# Patient Record
Sex: FEMALE | Race: BLACK OR AFRICAN AMERICAN | Employment: FULL TIME | URBAN - METROPOLITAN AREA
[De-identification: names, ages, dates, MRNs, and addresses within clinical notes are randomized per-mention and may not be internally consistent; named-entity substitution may affect disease eponyms.]

---

## 2017-10-05 ENCOUNTER — APPOINTMENT (OUTPATIENT)
Dept: GENERAL RADIOLOGY | Facility: HOSPITAL | Age: 32
End: 2017-10-05
Attending: EMERGENCY MEDICINE
Payer: COMMERCIAL

## 2017-10-05 ENCOUNTER — HOSPITAL ENCOUNTER (EMERGENCY)
Facility: HOSPITAL | Age: 32
Discharge: HOME | End: 2017-10-05
Attending: EMERGENCY MEDICINE
Payer: COMMERCIAL

## 2017-10-05 VITALS
BODY MASS INDEX: 31.07 KG/M2 | DIASTOLIC BLOOD PRESSURE: 77 MMHG | SYSTOLIC BLOOD PRESSURE: 114 MMHG | HEIGHT: 68 IN | WEIGHT: 205 LBS | TEMPERATURE: 98 F | RESPIRATION RATE: 16 BRPM | OXYGEN SATURATION: 99 % | HEART RATE: 59 BPM

## 2017-10-05 DIAGNOSIS — R07.89 CHEST WALL PAIN: Primary | ICD-10-CM

## 2017-10-05 PROCEDURE — 96375 TX/PRO/DX INJ NEW DRUG ADDON: CPT

## 2017-10-05 PROCEDURE — 80048 BASIC METABOLIC PNL TOTAL CA: CPT | Performed by: EMERGENCY MEDICINE

## 2017-10-05 PROCEDURE — 81025 URINE PREGNANCY TEST: CPT | Performed by: EMERGENCY MEDICINE

## 2017-10-05 PROCEDURE — 93005 ELECTROCARDIOGRAM TRACING: CPT

## 2017-10-05 PROCEDURE — 71010 XR CHEST AP PORTABLE  (CPT=71010): CPT | Performed by: EMERGENCY MEDICINE

## 2017-10-05 PROCEDURE — 93010 ELECTROCARDIOGRAM REPORT: CPT | Performed by: EMERGENCY MEDICINE

## 2017-10-05 PROCEDURE — 81003 URINALYSIS AUTO W/O SCOPE: CPT | Performed by: EMERGENCY MEDICINE

## 2017-10-05 PROCEDURE — 85025 COMPLETE CBC W/AUTO DIFF WBC: CPT | Performed by: EMERGENCY MEDICINE

## 2017-10-05 PROCEDURE — 84484 ASSAY OF TROPONIN QUANT: CPT | Performed by: EMERGENCY MEDICINE

## 2017-10-05 PROCEDURE — 99285 EMERGENCY DEPT VISIT HI MDM: CPT

## 2017-10-05 PROCEDURE — 96374 THER/PROPH/DIAG INJ IV PUSH: CPT

## 2017-10-05 RX ORDER — DIAZEPAM 5 MG/ML
2.5 INJECTION, SOLUTION INTRAMUSCULAR; INTRAVENOUS ONCE
Status: COMPLETED | OUTPATIENT
Start: 2017-10-05 | End: 2017-10-05

## 2017-10-05 RX ORDER — IBUPROFEN 600 MG/1
600 TABLET ORAL EVERY 8 HOURS PRN
Qty: 20 TABLET | Refills: 0 | Status: SHIPPED | OUTPATIENT
Start: 2017-10-05 | End: 2017-10-12

## 2017-10-05 RX ORDER — KETOROLAC TROMETHAMINE 30 MG/ML
30 INJECTION, SOLUTION INTRAMUSCULAR; INTRAVENOUS ONCE
Status: COMPLETED | OUTPATIENT
Start: 2017-10-05 | End: 2017-10-05

## 2017-10-05 RX ORDER — MORPHINE SULFATE 2 MG/ML
2 INJECTION, SOLUTION INTRAMUSCULAR; INTRAVENOUS ONCE
Status: COMPLETED | OUTPATIENT
Start: 2017-10-05 | End: 2017-10-05

## 2017-10-05 RX ORDER — CYCLOBENZAPRINE HCL 10 MG
10 TABLET ORAL 3 TIMES DAILY PRN
Qty: 20 TABLET | Refills: 0 | Status: SHIPPED | OUTPATIENT
Start: 2017-10-05 | End: 2017-10-12

## 2017-10-05 NOTE — ED PROVIDER NOTES
Patient Seen in: [facility] AND [facility] Emergency Department    History   Patient presents with:  Chest Pain Angina (cardiovascular)    Stated Complaint:     HPI    22-year-old female with no significant past medical history here with complaints of right-side other systems reviewed and negative except as noted above. PSFH elements reviewed from today and agreed except as otherwise stated in HPI.     Physical Exam   ED Triage Vitals  BP: 114/77 [10/05/17 1031]  Pulse: 80 [10/05/17 0925]  Resp: 16 [10/05/17 092 the cardiac monitor and a rhythm strip obtained with the indication of chest pain. Monitor shows regular rhythm at a rate of 58 bpm.     My interpretation is   abnormal for rate   Normal for rhythm  Without ectopy.      Pulse Oximeter:  Pulse oximetry on r K/UL   Eosinophil Absolute 0.1 0.0 - 0.7 K/UL   Basophil Absolute 0.0 0.0 - 0.2 K/UL   -URINALYSIS WITH CULTURE REFLEX   Collection Time: 10/05/17 10:06 AM   Result Value Ref Range   Urine Color Yellow Yellow   Clarity Urine Hazy (A) Clear   Spec [place] 1 and reviewed those reports. Complicating Factors: The patient already has has Chronic idiopathic urticaria;  Allergic rhinitis due to allergen; and Allergic rhinitis due to other allergen on her problem list. to contribute to the complexity of his ED valdez

## 2018-07-29 ENCOUNTER — APPOINTMENT (OUTPATIENT)
Dept: CT IMAGING | Facility: HOSPITAL | Age: 33
End: 2018-07-29
Attending: EMERGENCY MEDICINE
Payer: COMMERCIAL

## 2018-07-29 ENCOUNTER — HOSPITAL ENCOUNTER (OUTPATIENT)
Age: 33
Discharge: ANOTHER HOSPITAL | End: 2018-07-29
Attending: EMERGENCY MEDICINE
Payer: COMMERCIAL

## 2018-07-29 ENCOUNTER — HOSPITAL ENCOUNTER (EMERGENCY)
Facility: HOSPITAL | Age: 33
Discharge: HOME | End: 2018-07-29
Attending: EMERGENCY MEDICINE
Payer: COMMERCIAL

## 2018-07-29 VITALS
RESPIRATION RATE: 16 BRPM | OXYGEN SATURATION: 100 % | BODY MASS INDEX: 26.07 KG/M2 | WEIGHT: 172 LBS | TEMPERATURE: 98 F | HEART RATE: 54 BPM | HEIGHT: 68 IN | SYSTOLIC BLOOD PRESSURE: 135 MMHG | DIASTOLIC BLOOD PRESSURE: 85 MMHG

## 2018-07-29 VITALS
OXYGEN SATURATION: 99 % | TEMPERATURE: 99 F | HEART RATE: 71 BPM | RESPIRATION RATE: 18 BRPM | WEIGHT: 173 LBS | HEIGHT: 68 IN | BODY MASS INDEX: 26.22 KG/M2 | SYSTOLIC BLOOD PRESSURE: 112 MMHG | DIASTOLIC BLOOD PRESSURE: 83 MMHG

## 2018-07-29 DIAGNOSIS — R51.9 ACUTE NONINTRACTABLE HEADACHE, UNSPECIFIED HEADACHE TYPE: Primary | ICD-10-CM

## 2018-07-29 DIAGNOSIS — R51.9 NONINTRACTABLE HEADACHE, UNSPECIFIED CHRONICITY PATTERN, UNSPECIFIED HEADACHE TYPE: Primary | ICD-10-CM

## 2018-07-29 LAB
ANION GAP SERPL CALC-SCNC: 6 MMOL/L (ref 0–18)
B-HCG UR QL: NEGATIVE
BASOPHILS # BLD: 0 K/UL (ref 0–0.2)
BASOPHILS NFR BLD: 0 %
BILIRUB UR QL: NEGATIVE
BUN SERPL-MCNC: 11 MG/DL (ref 8–20)
BUN/CREAT SERPL: 12.1 (ref 10–20)
CALCIUM SERPL-MCNC: 9 MG/DL (ref 8.5–10.5)
CHLORIDE SERPL-SCNC: 105 MMOL/L (ref 95–110)
CLARITY UR: CLEAR
CO2 SERPL-SCNC: 26 MMOL/L (ref 22–32)
COLOR UR: YELLOW
CREAT SERPL-MCNC: 0.91 MG/DL (ref 0.5–1.5)
EOSINOPHIL # BLD: 0 K/UL (ref 0–0.7)
EOSINOPHIL NFR BLD: 1 %
ERYTHROCYTE [DISTWIDTH] IN BLOOD BY AUTOMATED COUNT: 14.6 % (ref 11–15)
GLUCOSE SERPL-MCNC: 87 MG/DL (ref 70–99)
GLUCOSE UR-MCNC: NEGATIVE MG/DL
HCT VFR BLD AUTO: 34.5 % (ref 35–48)
HGB BLD-MCNC: 11.3 G/DL (ref 12–16)
HGB UR QL STRIP.AUTO: NEGATIVE
KETONES UR-MCNC: NEGATIVE MG/DL
LYMPHOCYTES # BLD: 1.8 K/UL (ref 1–4)
LYMPHOCYTES NFR BLD: 44 %
MCH RBC QN AUTO: 26.8 PG (ref 27–32)
MCHC RBC AUTO-ENTMCNC: 32.6 G/DL (ref 32–37)
MCV RBC AUTO: 82 FL (ref 80–100)
MONOCYTES # BLD: 0.3 K/UL (ref 0–1)
MONOCYTES NFR BLD: 7 %
NEUTROPHILS # BLD AUTO: 2 K/UL (ref 1.8–7.7)
NEUTROPHILS NFR BLD: 48 %
NITRITE UR QL STRIP.AUTO: NEGATIVE
OSMOLALITY UR CALC.SUM OF ELEC: 283 MOSM/KG (ref 275–295)
PH UR: 7 [PH] (ref 5–8)
PLATELET # BLD AUTO: 184 K/UL (ref 140–400)
PMV BLD AUTO: 8.3 FL (ref 7.4–10.3)
POTASSIUM SERPL-SCNC: 3.7 MMOL/L (ref 3.3–5.1)
PROT UR-MCNC: NEGATIVE MG/DL
RBC # BLD AUTO: 4.21 M/UL (ref 3.7–5.4)
RBC #/AREA URNS AUTO: 1 /HPF
SODIUM SERPL-SCNC: 137 MMOL/L (ref 136–144)
SP GR UR STRIP: 1.01 (ref 1–1.03)
UROBILINOGEN UR STRIP-ACNC: <2
VIT C UR-MCNC: NEGATIVE MG/DL
WBC # BLD AUTO: 4.2 K/UL (ref 4–11)
WBC #/AREA URNS AUTO: 3 /HPF

## 2018-07-29 PROCEDURE — 81025 URINE PREGNANCY TEST: CPT

## 2018-07-29 PROCEDURE — 99285 EMERGENCY DEPT VISIT HI MDM: CPT

## 2018-07-29 PROCEDURE — 80048 BASIC METABOLIC PNL TOTAL CA: CPT | Performed by: EMERGENCY MEDICINE

## 2018-07-29 PROCEDURE — 70450 CT HEAD/BRAIN W/O DYE: CPT | Performed by: EMERGENCY MEDICINE

## 2018-07-29 PROCEDURE — 96361 HYDRATE IV INFUSION ADD-ON: CPT

## 2018-07-29 PROCEDURE — 81001 URINALYSIS AUTO W/SCOPE: CPT | Performed by: EMERGENCY MEDICINE

## 2018-07-29 PROCEDURE — 85025 COMPLETE CBC W/AUTO DIFF WBC: CPT | Performed by: EMERGENCY MEDICINE

## 2018-07-29 PROCEDURE — 96375 TX/PRO/DX INJ NEW DRUG ADDON: CPT

## 2018-07-29 PROCEDURE — 93005 ELECTROCARDIOGRAM TRACING: CPT

## 2018-07-29 PROCEDURE — 96374 THER/PROPH/DIAG INJ IV PUSH: CPT

## 2018-07-29 PROCEDURE — 99213 OFFICE O/P EST LOW 20 MIN: CPT

## 2018-07-29 PROCEDURE — 93010 ELECTROCARDIOGRAM REPORT: CPT | Performed by: EMERGENCY MEDICINE

## 2018-07-29 RX ORDER — MELOXICAM 7.5 MG/1
7.5 TABLET ORAL DAILY
Qty: 14 TABLET | Refills: 0 | Status: SHIPPED | OUTPATIENT
Start: 2018-07-29 | End: 2018-08-12

## 2018-07-29 RX ORDER — METOCLOPRAMIDE HYDROCHLORIDE 5 MG/ML
10 INJECTION INTRAMUSCULAR; INTRAVENOUS ONCE
Status: COMPLETED | OUTPATIENT
Start: 2018-07-29 | End: 2018-07-29

## 2018-07-29 RX ORDER — DIPHENHYDRAMINE HYDROCHLORIDE 50 MG/ML
25 INJECTION INTRAMUSCULAR; INTRAVENOUS ONCE
Status: COMPLETED | OUTPATIENT
Start: 2018-07-29 | End: 2018-07-29

## 2018-07-29 RX ORDER — DEXAMETHASONE SODIUM PHOSPHATE 4 MG/ML
10 VIAL (ML) INJECTION ONCE
Status: COMPLETED | OUTPATIENT
Start: 2018-07-29 | End: 2018-07-29

## 2018-07-29 RX ORDER — KETOROLAC TROMETHAMINE 30 MG/ML
30 INJECTION, SOLUTION INTRAMUSCULAR; INTRAVENOUS ONCE
Status: COMPLETED | OUTPATIENT
Start: 2018-07-29 | End: 2018-07-29

## 2018-07-29 RX ORDER — DIPHENHYDRAMINE HCL 25 MG
25 TABLET ORAL EVERY 6 HOURS PRN
Qty: 20 TABLET | Refills: 0 | Status: SHIPPED | OUTPATIENT
Start: 2018-07-29 | End: 2018-08-03

## 2018-07-29 RX ORDER — METOCLOPRAMIDE 10 MG/1
10 TABLET ORAL EVERY 6 HOURS PRN
Qty: 20 TABLET | Refills: 0 | Status: SHIPPED | OUTPATIENT
Start: 2018-07-29 | End: 2018-08-03

## 2018-07-29 NOTE — ED PROVIDER NOTES
Patient Seen in: [facility] AND [facility] Emergency Department    History   Patient presents with:  Headache (neurologic)    Stated Complaint: Headache/dizziness     HPI    28 yo F with seasonal allergies presenting with one day of diffuse frontal cephalgia assoc Triage Vitals [07/29/18 1603]  BP: (!) 142/91  Pulse: 50  Resp: 18  Temp: 98.6 °F (37 °C)  Temp src: Oral  SpO2: 100 %  O2 Device: None (Room air)    Current:BP (!) 142/91   Pulse 50   Temp 98.6 °F (37 °C) (Oral)   Resp 18   Ht 172.7 cm (5' 8\")   Wt 78.5 RAINBOW DRAW LAVENDER TALL (BNP)     EKG    Rate, intervals and axes as noted on EKG Report.   Rate: 47  Rhythm: Sinus Rhythm  Reading: sinus shahzad 55 without STT changes and unchanged from 10/5/17 as interpreted by myself           Ct Brain Or Head (6050 Impression:  Nonintractable headache, unspecified chronicity pattern, unspecified headache type  (primary encounter diagnosis)    Disposition:  Discharge    Follow-up:  Royer Fallon [address]  [address] [id number]    Call  For follow

## 2018-07-29 NOTE — ED PROVIDER NOTES
Patient Seen in: [facility]    History   Patient presents with:  Headache (neurologic)    Stated Complaint: dizzy, headache     HPI    This patient complains of headache and dizziness.   Patient states she has had a headac denies vertigo    All other systems reviewed and negative except as noted above.     Physical Exam   ED Triage Vitals [07/29/18 1513]  BP: 135/85  Pulse: 54  Resp: 16  Temp: 98.1 °F (36.7 °C)  Temp src: Oral  SpO2: 100 %  O2 Device: None (Room air)    Antoine

## 2018-09-17 ENCOUNTER — OFFICE VISIT (OUTPATIENT)
Dept: NEUROLOGY | Facility: CLINIC | Age: 33
End: 2018-09-17
Payer: COMMERCIAL

## 2018-09-17 VITALS
HEIGHT: 68 IN | HEART RATE: 66 BPM | DIASTOLIC BLOOD PRESSURE: 70 MMHG | WEIGHT: 174 LBS | SYSTOLIC BLOOD PRESSURE: 114 MMHG | BODY MASS INDEX: 26.37 KG/M2

## 2018-09-17 DIAGNOSIS — G43.109 MIGRAINE WITH AURA AND WITHOUT STATUS MIGRAINOSUS, NOT INTRACTABLE: Primary | ICD-10-CM

## 2018-09-17 PROCEDURE — 99243 OFF/OP CNSLTJ NEW/EST LOW 30: CPT | Performed by: OTHER

## 2018-09-17 RX ORDER — DIPHENHYDRAMINE HCL 25 MG
25 TABLET ORAL NIGHTLY PRN
COMMUNITY

## 2018-09-17 RX ORDER — METOCLOPRAMIDE 10 MG/1
10 TABLET ORAL AS NEEDED
COMMUNITY
End: 2018-09-17

## 2018-09-17 RX ORDER — SUMATRIPTAN 50 MG/1
TABLET, FILM COATED ORAL
Qty: 9 TABLET | Refills: 3 | Status: SHIPPED | OUTPATIENT
Start: 2018-09-17 | End: 2019-02-04

## 2018-09-17 RX ORDER — METOCLOPRAMIDE 10 MG/1
TABLET ORAL
Qty: 30 TABLET | Refills: 1 | Status: SHIPPED | OUTPATIENT
Start: 2018-09-17 | End: 2021-02-24

## 2018-09-17 RX ORDER — MELOXICAM 7.5 MG/1
TABLET ORAL
Qty: 45 TABLET | Refills: 1 | Status: SHIPPED | OUTPATIENT
Start: 2018-09-17 | End: 2021-02-24

## 2018-09-17 RX ORDER — MELOXICAM 7.5 MG/1
7.5 TABLET ORAL AS NEEDED
COMMUNITY
End: 2018-09-17

## 2018-09-17 NOTE — PATIENT INSTRUCTIONS
Migraine headache  Introduction  Migraines are extremely painful, recurring headaches that are sometimes accompanied by other symptoms, such as visual disturbances, for example, seeing an aura or nausea.  There are 2 types of migraine:  Migraine with aura, vessels narrow or constrict, reducing blood flow and leading to visual disturbances, difficulty speaking, weakness, numbness, or tingling sensation in one area of the body, or other similar symptoms.  Later, the blood vessels dilate or enlarge, leading to i whether you have a migraine or another kind of headache, such as a tension or sinus headache.  Your doctor will ask questions about when your headaches occur, how long they last, how often they come on, the location of the pain, and any symptoms that accomp and time it started. Note what you ate for the preceding 24 hours, how long you slept the night before, what you were doing just before the headache, any unusual stress in your life, how long the headache lasted, and what you did to make it stop.   Other li drugs help prevent migraines, although researchers are not sure why:   Divalproex sodium (Depakote)   Gabapentin (Neurontin)   Topiramate (Topamax)   Botox.  Botox, a medication made from a purified form of botulinum toxin, has been approved to treat migrai Cheese   Monosodium glutamate (MSG), a flavor enhancer found often in food from [employer] containing the amino acid tyramine, found in red wine, aged cheese, smoked fish, chicken livers, figs, and some beans   Nuts   Peanut butter   Hari people with low levels of magnesium. In one study, people who took magnesium reduce the frequency of attacks by 41.6%, compared to 15.8% in those who took placebo.  Some studies also suggest that magnesium may help women whose migraines are triggered by the months. More research is needed to see whether butterbur is effective at preventing migraines. The studies used a standardized extract that lowered the amount of substances in the herb that might potentially harm the liver.  If you want to try butterbur for sinensis). Ask your doctor before taking Lex Hoe, as it may interact with some medications or cause problems for people with some cancers. Lisa (Zingiber officinale)   Ginkgo biloba   [place] bark(Salix spp.).  People who are sensitive to aspirin shoul are believed to correspond to areas throughout the body. Preliminary studies suggest it may relieve pain and allow people with migraines to take less pain medication. More research is needed.  Practitioners believe reflexology helps you become more aware of the head and may be relieved following urination; this remedy is most appropriate for individuals who feel extremely weak and have difficulty keeping their eyes open. Ignatia.  For pain that may be described as a feeling of something being driven into the pattern (such as every seven days), and are accompanied by nausea and vomiting; pain is aggravated by motion, light or sun exposure, odors, and noise; this remedy is appropriate for children who may have a craving for spicy or acidic foods, despite having over-the-counter or prescription, or any complementary therapy before or during your pregnancy. Some doctors may recommend treating mild-to-moderate attacks during pregnancy with acetaminophen (Tylenol).   Warnings and Precautions  Use medications only as d

## 2018-09-17 NOTE — PROGRESS NOTES
Neurology Outpatient Consult Note    Leah Myers : 1985   Referring Physician: Dr. Alyssa Machado  HPI:     Leah Myers is a 27year old female who is being seen in neurologic evaluation. Patient is being seen in evaluation for headache. mouth nightly. Disp: 30 tablet Rfl: 11   Fluticasone Propionate (FLONASE) 50 MCG/ACT Nasal Suspension 1 spray by Nasal route daily. Disp: 1 Bottle Rfl: 11   Cetirizine HCl 10 MG Oral Cap Take  by mouth.  Disp:  Rfl:       Past Medical History:   Diagnosis D wheezing  CARDIOVASCULAR: no chest pain, no palpitations  GI: see HPI  GENITAL/: no dysuria, no frequency  MUSCULOSKELETAL: no muscle pain, no muscle weakness    PSYCH: no depression, no anxiety  NEURO: see HPI    EXAM:     Vitals:  /70   Pulse 66

## 2018-09-26 ENCOUNTER — APPOINTMENT (OUTPATIENT)
Dept: LAB | Facility: HOSPITAL | Age: 33
End: 2018-09-26
Attending: INTERNAL MEDICINE
Payer: COMMERCIAL

## 2018-09-26 ENCOUNTER — HOSPITAL ENCOUNTER (OUTPATIENT)
Dept: GENERAL RADIOLOGY | Facility: HOSPITAL | Age: 33
Discharge: HOME | End: 2018-09-26
Attending: INTERNAL MEDICINE
Payer: COMMERCIAL

## 2018-09-26 ENCOUNTER — OFFICE VISIT (OUTPATIENT)
Dept: RHEUMATOLOGY | Facility: CLINIC | Age: 33
End: 2018-09-26
Payer: COMMERCIAL

## 2018-09-26 VITALS
DIASTOLIC BLOOD PRESSURE: 80 MMHG | RESPIRATION RATE: 17 BRPM | BODY MASS INDEX: 26.52 KG/M2 | SYSTOLIC BLOOD PRESSURE: 120 MMHG | HEART RATE: 60 BPM | HEIGHT: 68 IN | WEIGHT: 175 LBS | TEMPERATURE: 99 F

## 2018-09-26 DIAGNOSIS — M65.9 TENOSYNOVITIS OF HAND: ICD-10-CM

## 2018-09-26 DIAGNOSIS — M25.50 POLYARTHRALGIA: ICD-10-CM

## 2018-09-26 DIAGNOSIS — M25.50 POLYARTHRALGIA: Primary | ICD-10-CM

## 2018-09-26 LAB
CRP SERPL-MCNC: <0.5 MG/DL (ref 0–0.9)
ERYTHROCYTE [SEDIMENTATION RATE] IN BLOOD: 13 MM/HR (ref 0–20)
RHEUMATOID FACT SER QL: <5 IU/ML

## 2018-09-26 PROCEDURE — 36415 COLL VENOUS BLD VENIPUNCTURE: CPT | Performed by: INTERNAL MEDICINE

## 2018-09-26 PROCEDURE — 73130 X-RAY EXAM OF HAND: CPT | Performed by: INTERNAL MEDICINE

## 2018-09-26 PROCEDURE — 99212 OFFICE O/P EST SF 10 MIN: CPT | Performed by: INTERNAL MEDICINE

## 2018-09-26 PROCEDURE — 86431 RHEUMATOID FACTOR QUANT: CPT | Performed by: INTERNAL MEDICINE

## 2018-09-26 PROCEDURE — 86140 C-REACTIVE PROTEIN: CPT | Performed by: INTERNAL MEDICINE

## 2018-09-26 PROCEDURE — 86200 CCP ANTIBODY: CPT | Performed by: INTERNAL MEDICINE

## 2018-09-26 PROCEDURE — 99204 OFFICE O/P NEW MOD 45 MIN: CPT | Performed by: INTERNAL MEDICINE

## 2018-09-26 PROCEDURE — 85652 RBC SED RATE AUTOMATED: CPT | Performed by: INTERNAL MEDICINE

## 2018-09-26 NOTE — PROGRESS NOTES
Sima Oneal is a 27year old female who presents for Patient presents with:  Joint Pain: swelling in hands and feet after activity, pins and needles in toes, random.       HPI:     I had the pleasure of seeing Sima Oneal on 9/26/2018 for evaluation f migraine, no more than 2 tablets in 24 hours Disp: 30 tablet Rfl: 1   Meloxicam 7.5 MG Oral Tab Take 1 tablet as needed for headache; no more than 2 tablets in 24 hours; take with food and water Disp: 45 tablet Rfl: 1   Levocetirizine Dihydrochloride (XYZA BMI 26.61 kg/m²   GEN: AAOx3, NAD  HEENT: EOMI, PERRLA, no injection or icterus, oral mucosa moist, no oral lesions. No lymphadenopathy. No facial rash  CVS: RRR, no murmurs rubs or gallops. Equal 2+ distal pulses.    LUNGS: CTAB, no increased work of breat K/UL 0.0   Glucose      70 - 99 mg/dL 87   Sodium      136 - 144 mmol/L 137   Potassium      3.3 - 5.1 mmol/L 3.7   Chloride      95 - 110 mmol/L 105   Carbon Dioxide, Total      22 - 32 mmol/L 26   BUN      8 - 20 mg/dL 11   CREATININE      0.50 - 1.50 mg

## 2018-09-26 NOTE — PATIENT INSTRUCTIONS
1. You were seen today for R hand pain likely due to Tenosynovitis   2. Plan to obtain xray of hands and blood work  3. Will also obtain US of hand  4. Once work up is done we may inject the R hand   5.  Follow up in 1-2 weeks, after US is done  Arthralgia your finger or thumb. It is also called tenosynovitis (ten-oh-sin-oh-VY-tis). Tendons (cordlike fibers that attach muscle to bone and allow you to bend the joints) become swollen.  So does the synovium (a slick membrane that allows the tendons to move easil care. Always follow your healthcare professional's instructions. Treating Trigger Finger     The tendon sheath is opened to release the tendon. Once the tendon can move freely again, the finger can bend and straighten more normally.      Trigger fing

## 2018-09-27 ENCOUNTER — HOSPITAL ENCOUNTER (OUTPATIENT)
Dept: ULTRASOUND IMAGING | Facility: HOSPITAL | Age: 33
Discharge: HOME | End: 2018-09-27
Attending: INTERNAL MEDICINE
Payer: COMMERCIAL

## 2018-09-27 DIAGNOSIS — M65.9 TENOSYNOVITIS OF HAND: ICD-10-CM

## 2018-09-27 DIAGNOSIS — M25.50 POLYARTHRALGIA: ICD-10-CM

## 2018-09-27 PROCEDURE — 76881 US COMPL JOINT R-T W/IMG: CPT | Performed by: INTERNAL MEDICINE

## 2018-09-28 LAB — CCP IGG SERPL-ACNC: 0.9 U/ML (ref 0–6.9)

## 2018-10-05 ENCOUNTER — OFFICE VISIT (OUTPATIENT)
Dept: RHEUMATOLOGY | Facility: CLINIC | Age: 33
End: 2018-10-05
Payer: COMMERCIAL

## 2018-10-05 VITALS
SYSTOLIC BLOOD PRESSURE: 111 MMHG | WEIGHT: 174.5 LBS | DIASTOLIC BLOOD PRESSURE: 70 MMHG | HEART RATE: 69 BPM | HEIGHT: 68 IN | BODY MASS INDEX: 26.45 KG/M2

## 2018-10-05 DIAGNOSIS — M65.321 TRIGGER INDEX FINGER OF RIGHT HAND: Primary | ICD-10-CM

## 2018-10-05 PROCEDURE — 20552 NJX 1/MLT TRIGGER POINT 1/2: CPT | Performed by: INTERNAL MEDICINE

## 2018-10-05 PROCEDURE — 99213 OFFICE O/P EST LOW 20 MIN: CPT | Performed by: INTERNAL MEDICINE

## 2018-10-05 RX ORDER — METHYLPREDNISOLONE ACETATE 80 MG/ML
80 INJECTION, SUSPENSION INTRA-ARTICULAR; INTRALESIONAL; INTRAMUSCULAR; SOFT TISSUE ONCE
Status: COMPLETED | OUTPATIENT
Start: 2018-10-05 | End: 2018-10-05

## 2018-10-05 RX ADMIN — METHYLPREDNISOLONE ACETATE 80 MG: 80 INJECTION, SUSPENSION INTRA-ARTICULAR; INTRALESIONAL; INTRAMUSCULAR; SOFT TISSUE at 12:45:00

## 2018-10-05 NOTE — PROCEDURES
With  consent, I injected the patient's  finger with 0.25 ml lidocaine 1% and 0.25ml depo 80. It was under sterile technique using iodine and alcohol swabs and ethyl chloride was used as an anaesthetic spray. Patient tolerated it well.

## 2018-10-05 NOTE — PROGRESS NOTES
Charlie Wooten is a 26year old female. HPI:   Patient presents with:  Hand Pain: Right hand/ finger pain, swelling, stiffness    I had the pleasure of seeing Charlie Wooten on 10/5/2018 for follow up or R hand pain.      Current Medications for pain:  n Take  by mouth. Disp:  Rfl:      .cmed  Allergies:    Augmentin  [Amoclan]        Comment:Other reaction(s): diarrhea      ROS:   All other ROS are negative.      PHYSICAL EXAM:   GEN: AAOx3, NAD  HEENT: EOMI, PERRLA, no injection or icterus, oral mucosa mo follow-up of R hand pain particularly at the base of 1st and 2nd MCP.  ESR, CRP, RF and CCP were negative. Ultrasound of the hand showed no evidence of synovitis, erosions or tenosynovitis.   She continues to complain of trigger finger in her 2nd digit and

## 2018-10-22 ENCOUNTER — TELEPHONE (OUTPATIENT)
Dept: NEUROLOGY | Facility: CLINIC | Age: 33
End: 2018-10-22

## 2018-11-13 ENCOUNTER — OFFICE VISIT (OUTPATIENT)
Dept: ORTHOPEDICS CLINIC | Facility: CLINIC | Age: 33
End: 2018-11-13
Payer: COMMERCIAL

## 2018-11-13 VITALS — SYSTOLIC BLOOD PRESSURE: 107 MMHG | RESPIRATION RATE: 22 BRPM | DIASTOLIC BLOOD PRESSURE: 60 MMHG | HEART RATE: 51 BPM

## 2018-11-13 DIAGNOSIS — M65.311 TRIGGER FINGER OF RIGHT THUMB: Primary | ICD-10-CM

## 2018-11-13 DIAGNOSIS — M65.321 TRIGGER INDEX FINGER OF RIGHT HAND: ICD-10-CM

## 2018-11-13 DIAGNOSIS — M65.342 TRIGGER RING FINGER OF LEFT HAND: ICD-10-CM

## 2018-11-13 PROCEDURE — 99203 OFFICE O/P NEW LOW 30 MIN: CPT | Performed by: ORTHOPAEDIC SURGERY

## 2018-11-13 PROCEDURE — 20550 NJX 1 TENDON SHEATH/LIGAMENT: CPT | Performed by: ORTHOPAEDIC SURGERY

## 2018-11-13 RX ORDER — BETAMETHASONE SODIUM PHOSPHATE AND BETAMETHASONE ACETATE 3; 3 MG/ML; MG/ML
3 INJECTION, SUSPENSION INTRA-ARTICULAR; INTRALESIONAL; INTRAMUSCULAR; SOFT TISSUE ONCE
Status: COMPLETED | OUTPATIENT
Start: 2018-11-13 | End: 2018-11-13

## 2018-11-13 RX ADMIN — BETAMETHASONE SODIUM PHOSPHATE AND BETAMETHASONE ACETATE 3 MG: 3; 3 INJECTION, SUSPENSION INTRA-ARTICULAR; INTRALESIONAL; INTRAMUSCULAR; SOFT TISSUE at 12:06:00

## 2018-11-13 NOTE — PROGRESS NOTES
Per verbal order from KELLE, draw up 1ml of 1% lidocaine and 1ml of Celestone for cortisone injection to left ring finger.  Kenia Lorenzo

## 2018-11-13 NOTE — PROCEDURES
The patient desired injection. Under sterile conditions and following informed consent, the patient was injected with lidocaine and celestone in their left ring finger flexor tendon sheath.   The patient tolerated the procedure well and there were no compl

## 2018-11-13 NOTE — PROGRESS NOTES
11/13/2018  Doloris Caper  9/27/1985  26year old   female  Barry Gottlieb MD    HPI:   Patient presents with:  Hand Pain: Right - onset few months ago - no injury - has pain and swelling in trhe hand- was seen by rheumatology and had injections in her Diagnosis Date   • Multiple allergies 2013    Allergies: spt + t,rw,w,m,c,d,cr,f 2013      Past Surgical History:   Procedure Laterality Date   • LAP SLEEVE GASTRECTOMY     • TONSILLECTOMY        Family History   Problem Relation Age of Onset   • Heart S infection, neurovascular injury, failure of the procedure, stiffness, and potential need for additional surgery as well as anesthetic complications including death. The patient consented to the procedure.   .The patient desired injection for her left ring

## 2018-11-16 ENCOUNTER — TELEPHONE (OUTPATIENT)
Dept: ORTHOPEDICS CLINIC | Facility: CLINIC | Age: 33
End: 2018-11-16

## 2018-11-16 NOTE — TELEPHONE ENCOUNTER
Pt requesting to speak to [address] re: work note needed due to sx on 12/11, states she sent mychart msg but it seems it was misunderstood. Pls advise thank you.

## 2018-11-21 ENCOUNTER — TELEPHONE (OUTPATIENT)
Dept: ORTHOPEDICS CLINIC | Facility: CLINIC | Age: 33
End: 2018-11-21

## 2018-11-21 NOTE — TELEPHONE ENCOUNTER
Pt procedure/Surgery:  Right index and thumb trigger release   Pt insurance/number to contact:  [employer] [phone]  Insurance ID# and group: ID# [id number]  Group#[id number]  Dx:   R47.352, M65.321  CPT:  11618  Surgeon: Dr. Mery Ramos

## 2018-11-24 NOTE — TELEPHONE ENCOUNTER
Surgery planned for 12/11/18  May have off up to 2 weeks for postoperative pain and recovery  Follow-up 2 weeks postop  May return to light duty as tolerated -- no lifting, pushing, pulling more than 10lbs, no repetitive work, ground level work, no climbin

## 2018-12-11 ENCOUNTER — ANESTHESIA EVENT (OUTPATIENT)
Dept: SURGERY | Facility: HOSPITAL | Age: 33
End: 2018-12-11
Payer: COMMERCIAL

## 2018-12-11 ENCOUNTER — ANESTHESIA (OUTPATIENT)
Dept: SURGERY | Facility: HOSPITAL | Age: 33
End: 2018-12-11
Payer: COMMERCIAL

## 2018-12-11 ENCOUNTER — HOSPITAL ENCOUNTER (OUTPATIENT)
Facility: HOSPITAL | Age: 33
Setting detail: HOSPITAL OUTPATIENT SURGERY
Discharge: HOME | End: 2018-12-11
Attending: ORTHOPAEDIC SURGERY | Admitting: ORTHOPAEDIC SURGERY
Payer: COMMERCIAL

## 2018-12-11 VITALS
SYSTOLIC BLOOD PRESSURE: 129 MMHG | OXYGEN SATURATION: 98 % | TEMPERATURE: 98 F | BODY MASS INDEX: 27.28 KG/M2 | HEIGHT: 68 IN | RESPIRATION RATE: 16 BRPM | HEART RATE: 51 BPM | WEIGHT: 180 LBS | DIASTOLIC BLOOD PRESSURE: 81 MMHG

## 2018-12-11 DIAGNOSIS — M65.321 TRIGGER INDEX FINGER OF RIGHT HAND: ICD-10-CM

## 2018-12-11 DIAGNOSIS — M65.311 TRIGGER THUMB OF RIGHT HAND: ICD-10-CM

## 2018-12-11 PROBLEM — M65.331 TRIGGER FINGER, RIGHT MIDDLE FINGER: Status: ACTIVE | Noted: 2018-12-11

## 2018-12-11 PROCEDURE — 0LN70ZZ RELEASE RIGHT HAND TENDON, OPEN APPROACH: ICD-10-PCS | Performed by: ORTHOPAEDIC SURGERY

## 2018-12-11 PROCEDURE — S0077 INJECTION, CLINDAMYCIN PHOSP: HCPCS | Performed by: ANESTHESIOLOGY

## 2018-12-11 PROCEDURE — 81025 URINE PREGNANCY TEST: CPT

## 2018-12-11 PROCEDURE — S0077 INJECTION, CLINDAMYCIN PHOSP: HCPCS

## 2018-12-11 RX ORDER — SODIUM CHLORIDE, SODIUM LACTATE, POTASSIUM CHLORIDE, CALCIUM CHLORIDE 600; 310; 30; 20 MG/100ML; MG/100ML; MG/100ML; MG/100ML
INJECTION, SOLUTION INTRAVENOUS CONTINUOUS
Status: DISCONTINUED | OUTPATIENT
Start: 2018-12-11 | End: 2018-12-11

## 2018-12-11 RX ORDER — KETOROLAC TROMETHAMINE 30 MG/ML
30 INJECTION, SOLUTION INTRAMUSCULAR; INTRAVENOUS ONCE
Status: COMPLETED | OUTPATIENT
Start: 2018-12-11 | End: 2018-12-11

## 2018-12-11 RX ORDER — LIDOCAINE HYDROCHLORIDE 10 MG/ML
INJECTION, SOLUTION EPIDURAL; INFILTRATION; INTRACAUDAL; PERINEURAL AS NEEDED
Status: DISCONTINUED | OUTPATIENT
Start: 2018-12-11 | End: 2018-12-11 | Stop reason: HOSPADM

## 2018-12-11 RX ORDER — HYDROCODONE BITARTRATE AND ACETAMINOPHEN 5; 325 MG/1; MG/1
1 TABLET ORAL AS NEEDED
Status: DISCONTINUED | OUTPATIENT
Start: 2018-12-11 | End: 2018-12-11

## 2018-12-11 RX ORDER — FAMOTIDINE 20 MG/1
20 TABLET ORAL ONCE
Status: DISCONTINUED | OUTPATIENT
Start: 2018-12-11 | End: 2018-12-11 | Stop reason: HOSPADM

## 2018-12-11 RX ORDER — HYDROCODONE BITARTRATE AND ACETAMINOPHEN 5; 325 MG/1; MG/1
2 TABLET ORAL AS NEEDED
Status: DISCONTINUED | OUTPATIENT
Start: 2018-12-11 | End: 2018-12-11

## 2018-12-11 RX ORDER — ONDANSETRON 2 MG/ML
4 INJECTION INTRAMUSCULAR; INTRAVENOUS EVERY 6 HOURS PRN
Status: CANCELLED | OUTPATIENT
Start: 2018-12-11

## 2018-12-11 RX ORDER — LIDOCAINE HYDROCHLORIDE 10 MG/ML
INJECTION, SOLUTION EPIDURAL; INFILTRATION; INTRACAUDAL; PERINEURAL AS NEEDED
Status: DISCONTINUED | OUTPATIENT
Start: 2018-12-11 | End: 2018-12-11 | Stop reason: SURG

## 2018-12-11 RX ORDER — MORPHINE SULFATE 4 MG/ML
2 INJECTION, SOLUTION INTRAMUSCULAR; INTRAVENOUS EVERY 10 MIN PRN
Status: DISCONTINUED | OUTPATIENT
Start: 2018-12-11 | End: 2018-12-11

## 2018-12-11 RX ORDER — CLINDAMYCIN PHOSPHATE 150 MG/ML
INJECTION, SOLUTION INTRAVENOUS AS NEEDED
Status: DISCONTINUED | OUTPATIENT
Start: 2018-12-11 | End: 2018-12-11 | Stop reason: SURG

## 2018-12-11 RX ORDER — MORPHINE SULFATE 4 MG/ML
4 INJECTION, SOLUTION INTRAMUSCULAR; INTRAVENOUS EVERY 10 MIN PRN
Status: DISCONTINUED | OUTPATIENT
Start: 2018-12-11 | End: 2018-12-11

## 2018-12-11 RX ORDER — ACETAMINOPHEN 500 MG
1000 TABLET ORAL ONCE
Status: COMPLETED | OUTPATIENT
Start: 2018-12-11 | End: 2018-12-11

## 2018-12-11 RX ORDER — NALOXONE HYDROCHLORIDE 0.4 MG/ML
80 INJECTION, SOLUTION INTRAMUSCULAR; INTRAVENOUS; SUBCUTANEOUS AS NEEDED
Status: DISCONTINUED | OUTPATIENT
Start: 2018-12-11 | End: 2018-12-11

## 2018-12-11 RX ORDER — ONDANSETRON 2 MG/ML
4 INJECTION INTRAMUSCULAR; INTRAVENOUS ONCE AS NEEDED
Status: DISCONTINUED | OUTPATIENT
Start: 2018-12-11 | End: 2018-12-11

## 2018-12-11 RX ORDER — BUPIVACAINE HYDROCHLORIDE 5 MG/ML
INJECTION, SOLUTION EPIDURAL; INTRACAUDAL AS NEEDED
Status: DISCONTINUED | OUTPATIENT
Start: 2018-12-11 | End: 2018-12-11 | Stop reason: HOSPADM

## 2018-12-11 RX ORDER — METOCLOPRAMIDE 10 MG/1
10 TABLET ORAL ONCE
Status: DISCONTINUED | OUTPATIENT
Start: 2018-12-11 | End: 2018-12-11 | Stop reason: HOSPADM

## 2018-12-11 RX ORDER — CEFAZOLIN SODIUM/WATER 2 G/20 ML
2 SYRINGE (ML) INTRAVENOUS ONCE
Status: DISCONTINUED | OUTPATIENT
Start: 2018-12-11 | End: 2018-12-11 | Stop reason: HOSPADM

## 2018-12-11 RX ORDER — MORPHINE SULFATE 10 MG/ML
6 INJECTION, SOLUTION INTRAMUSCULAR; INTRAVENOUS EVERY 10 MIN PRN
Status: DISCONTINUED | OUTPATIENT
Start: 2018-12-11 | End: 2018-12-11

## 2018-12-11 RX ORDER — HYDROCODONE BITARTRATE AND ACETAMINOPHEN 5; 325 MG/1; MG/1
1-2 TABLET ORAL EVERY 6 HOURS PRN
Qty: 20 TABLET | Refills: 0 | Status: SHIPPED | OUTPATIENT
Start: 2018-12-11 | End: 2019-03-05 | Stop reason: ALTCHOICE

## 2018-12-11 RX ADMIN — SODIUM CHLORIDE, SODIUM LACTATE, POTASSIUM CHLORIDE, CALCIUM CHLORIDE: 600; 310; 30; 20 INJECTION, SOLUTION INTRAVENOUS at 17:03:00

## 2018-12-11 RX ADMIN — LIDOCAINE HYDROCHLORIDE 25 MG: 10 INJECTION, SOLUTION EPIDURAL; INFILTRATION; INTRACAUDAL; PERINEURAL at 16:25:00

## 2018-12-11 RX ADMIN — CLINDAMYCIN PHOSPHATE 600 MG: 150 INJECTION, SOLUTION INTRAVENOUS at 16:26:00

## 2018-12-11 NOTE — H&P
Shivani Brady  9/27/1985  26year old   female  Tess Vargas MD     HPI:   Patient presents with:  Hand Pain: Right - onset few months ago - no injury - has pain and swelling in trhe hand- was seen by rheumatology and had injections in her fingers - l Diagnosis Date   • Multiple allergies 2013     Allergies: spt + t,rw,w,m,c,d,cr,f 2013               Past Surgical History:   Procedure Laterality Date   • LAP SLEEVE GASTRECTOMY       • TONSILLECTOMY                 Family History   Problem Relation Age Surgery recommended was right index, right middle, and right thumb trigger release.   Risks include bleeding, infection, neurovascular injury, failure of the procedure, stiffness, and potential need for additional surgery as well as anesthetic complications

## 2018-12-11 NOTE — ADDENDUM NOTE
Addendum  created 12/11/18 1704 by Ruben Rock MD    Review and Sign - Ready for Procedure, Review and Sign - Signed

## 2018-12-11 NOTE — ANESTHESIA PREPROCEDURE EVALUATION
Anesthesia PreOp Note    HPI:     Serafin Galindo is a 26year old female who presents for preoperative consultation requested by: Shiloh Davila MD    Date of Surgery: 12/11/2018    Procedure(s):   FINGER TRIGGER RELEASE  Indication: Trigger thumb of rig tablet Rfl: 1 12/9/2018 at Unknown time   Levocetirizine Dihydrochloride (XYZAL) 5 MG Oral Tab Take 1 tablet by mouth nightly.  Disp: 30 tablet Rfl: 11 12/9/2018 at Unknown time   Fluticasone Propionate (FLONASE) 50 MCG/ACT Nasal Suspension 1 spray by Nasal Smokeless tobacco: Never Used    Substance and Sexual Activity      Alcohol use: No      Drug use: No      Sexual activity: Not on file    Other Topics      Concerns:         Service: Not Asked        Blood Transfusions: Not Asked        Caffeine complications, and any alternative forms of anesthetic management. All of the patient's questions were answered to the best of my ability. The patient desires the anesthetic management as planned.   Ashley Lechuga  12/11/2018 4:12 PM

## 2018-12-11 NOTE — ANESTHESIA POSTPROCEDURE EVALUATION
Patient: Carolene Peabody    Procedure Summary     Date:  12/11/18 Room / Location:  [facility] OR 08 / [facility] OR    Anesthesia Start:  9646 Anesthesia Stop:  0665    Procedure:  FINGER TRIGGER RELEASE (Right Hand) Diagnosis:       Trigger thumb of right sol

## 2018-12-11 NOTE — BRIEF OP NOTE
Pre-Operative Diagnosis: Trigger thumb of right hand [M65.311]  Trigger index finger of right hand, trigger middle finger of right hand     Post-Operative Diagnosis: same     Procedure Performed:   Procedure(s):  RIGHT INDEX, MIDDLE, AND THUMB TRIGGER RELE

## 2018-12-12 NOTE — OPERATIVE REPORT
After Visit Summary   2/2/2018    Layne Guadarrama    MRN: [id number]           Patient Information     Date Of Birth          1944        Visit Information        Provider Department      2/2/2018 9:30 AM Rita Muhammad MD [facility]        Today's Diagnoses     LVAD (left ventricular assist device) present (H)    -  1    Chronic systolic congestive heart failure (H)        Benign essential hypertension          Care Instructions    Medications:  1. Increase your dose of Losartan to 75mg daily. I you have dizziness, please call your VAD coordinator, Colt ([phone])    Follow-up:  1. We will order some labs for you to get drawn next week in [place] with your INR check.  2. Please see the Jazmine MARTINEZ In early March for follow up appointment.    Instructions:  1. Start using the weekly dsng change when it arrives in the mail.  2. You can start showering with you shower bag after I show you how to use it.    Page the VAD Coordinator on call if you gain more than 3 lb in a day or 5 in a week. Please also page if you feel unwell or have alarms.     Great to see you in clinic today. To Page the VAD Coordinator on call, dial [phone] option #4 and ask to speak to the VAD coordinator on call.             Follow-ups after your visit        Follow-up notes from your care team     Return in about 4 weeks (around 3/2/2018), or With Jazmine LAFLEUR, for BP Recheck, Physical Exam, Lab Work.      Your next 10 appointments already scheduled     Mar 12, 2018  3:30 PM CDT   Lab with [facility] LAB   [facility] ([facility])    [address] [place] [address]  1st Floor  [facility] [address] [phone]            Mar 12, 2018  4:00 PM CDT   (Arrive by 3:45 PM)   Ventricular Assist Device with Jazmine Santiago NP   [facility] ([facility])    [address] [place] [address]  [address] [place] [id number] [phone]            Apr 18, 2018  3:00  [facility]    PATIENT'S NAME: Kathy Dueñas   ATTENDING PHYSICIAN: Coty Irby MD   OPERATING PHYSICIAN: Coty Irby MD   PATIENT ACCOUNT#:   [id number]    LOCATION:  [place] [address]  MEDICAL RECORD #:   [id number]       DA PM CDT   ICD Check with RU DEREKN   [facility] ([facility])    [address]  [facility] [id number] [phone]            May 18, 2018  8:30 AM CDT   (Arrive by 8:15 AM)   Implanted Defibulator with Uc Cv Device 1   [facility] ([facility])    [address]  [address]  [facility] [address] [phone]            May 18, 2018  9:00 AM CDT   (Arrive by 8:45 AM)   Ventricular Assist Device with Rita Muhammad MD   [facility] ([facility])    [address]  [address]  [facility] [address] [phone]              Future tests that were ordered for you today     Open Future Orders        Priority Expected Expires Ordered    Follow-Up with Device Clinic - 3 months Routine 5/3/2018 8/1/2018 2/2/2018            Who to contact     If you have questions or need follow up information about today's clinic visit or your schedule please contact [facility] directly at [phone].  Normal or non-critical lab and imaging results will be communicated to you by Beacon Readerhart, letter or phone within 4 business days after the clinic has received the results. If you do not hear from us within 7 days, please contact the clinic through Oreet or phone. If you have a critical or abnormal lab result, we will notify you by phone as soon as possible.  Submit refill requests through FirstString or call your pharmacy and they will forward the refill request to us. Please allow 3 business days for your refill to be completed.          Additional Information About Your Visit        Beacon Readerhart Information     FirstString gives you secure access to your electronic health record. If you see a primary care provider, you can also send messages to your care team and make appointments. If you have questions, please call your primary care clinic.  If you do not have a primary care  operating room and transferred to the operating table in the supine position. Sedation was induced. Her right upper extremity was prepped and draped in a sterile fashion.   Local anesthesia with 1% lidocaine and 0.5% Marcaine in a 1:1 mixture was given to "provider, please call [phone] and they will assist you.        Care EveryWhere ID     This is your Care EveryWhere ID. This could be used by other organizations to access your [place] medical records  [id number]        Your Vitals Were     Pulse Temperature Respirations Height Pulse Oximetry BMI (Body Mass Index)    62 97.4  F (36.3  C) 22 1.676 m (5' 6\") 99% 27.75 kg/m2       Blood Pressure from Last 3 Encounters:   02/02/18 (!) 92/0   01/22/18 (!) 90/0   01/22/18 (!) 90/0    Weight from Last 3 Encounters:   02/02/18 78 kg (171 lb 14.4 oz)   01/22/18 78 kg (172 lb)   01/22/18 78 kg (172 lb)              We Performed the Following     (39037) INTERROGATE VENT ASSIST DEVICE, IN PERSON, W MD ANALYSIS PARAMETERS          Today's Medication Changes          These changes are accurate as of 2/2/18 11:42 AM.  If you have any questions, ask your nurse or doctor.               These medicines have changed or have updated prescriptions.        Dose/Directions    losartan 50 MG tablet   Commonly known as:  COZAAR   This may have changed:  how much to take   Used for:  LVAD (left ventricular assist device) present (H), Benign essential hypertension   Changed by:  Rita Muhammad MD        Dose:  75 mg   Take 1.5 tablets (75 mg) by mouth daily   Quantity:  30 tablet   Refills:  0            Where to get your medicines      These medications were sent to [place] [id number] - [place], [place] - [address]  [facility]  [address]  [facility] [address]     Phone:  [phone]     losartan 50 MG tablet                Primary Care Provider Office Phone # Fax #    [place] Alex Sapp [phone] [address] [facility] [address]        Equal Access to Services     HERNANDO TUCKER : Keyanna abernathy Sojanusz, waaxda luqadaha, qaybta kaalmada adeegyada, sylvia arevalo. So [facility] [phone].    ATENCIÓN: Si habla español, tiene a mendes disposición servicios " answered. She was in agreement with the treatment plan.     Dictated By Blue Han MD  d: 12/11/2018 17:01:22  t: 12/11/2018 20:15:59  [facility] [id number]  QYK/ sophia de asistencia lingüística. Katherine sanchez [phone].    We comply with applicable federal civil rights laws and [place] laws. We do not discriminate on the basis of race, color, national origin, age, disability, sex, sexual orientation, or gender identity.            Thank you!     Thank you for choosing [facility]  for your care. Our goal is always to provide you with excellent care. Hearing back from our patients is one way we can continue to improve our services. Please take a few minutes to complete the written survey that you may receive in the mail after your visit with us. Thank you!             Your Updated Medication List - Protect others around you: Learn how to safely use, store and throw away your medicines at www.disposemymeds.org.          This list is accurate as of 2/2/18 11:42 AM.  Always use your most recent med list.                   Brand Name Dispense Instructions for use Diagnosis    acetaminophen 325 MG tablet    TYLENOL    100 tablet    Take 2 tablets (650 mg) by mouth every 4 hours as needed for other (surgical pain)    Acute post-operative pain       albuterol 108 (90 BASE) MCG/ACT Inhaler    PROAIR HFA/PROVENTIL HFA/VENTOLIN HFA    1 Inhaler    Inhale 2 puffs into the lungs every 4 hours as needed for shortness of breath / dyspnea    Bronchitis with bronchospasm       aspirin 81 MG chewable tablet     30 tablet    Take 1 tablet (81 mg) by mouth daily    LVAD (left ventricular assist device) present (H)       blood glucose lancets standard    no brand specified    100 each    Use to test blood sugar 4 times daily or as directed.    Type 2 diabetes mellitus with diabetic nephropathy, unspecified long term insulin use status (H)       blood glucose monitoring test strip    ACCU-CHEK GUIDE    100 each    Use to test blood sugar 4 times daily or as directed.    Type 2 diabetes mellitus with diabetic nephropathy, unspecified long term insulin use status (H)       carvedilol  3.125 MG tablet    COREG    60 tablet    Take 2 tablets (6.25 mg) by mouth 2 times daily (with meals)    Chronic systolic congestive heart failure (H)       cholecalciferol 1000 UNIT tablet    vitamin D3    180 tablet    Take 2 tablets (2,000 Units) by mouth daily    Vitamin D deficiency       cyanocobalamin 1000 MCG/ML injection    VITAMIN B12    1 mL    Give 1000mcg/ml, 1 injection (1ml) per month. Dr. Patrick Cantu / [facility]    Vitamin B12 deficiency (non anemic)       * glipiZIDE 10 MG 24 hr tablet    GLUCOTROL XL    30 tablet    Take 1 tablet (10 mg) by mouth daily (with breakfast)    Type 2 diabetes mellitus with stage 3 chronic kidney disease, without long-term current use of insulin (H)       * glipiZIDE 5 MG 24 hr tablet    GLUCOTROL XL    90 tablet    TAKE 1 TABLET (5 MG) BY MOUTH DAILY    Type 2 diabetes mellitus with stage 2 chronic kidney disease, without long-term current use of insulin (H)       losartan 50 MG tablet    COZAAR    30 tablet    Take 1.5 tablets (75 mg) by mouth daily    LVAD (left ventricular assist device) present (H), Benign essential hypertension       multivitamin, therapeutic with minerals Tabs tablet     30 each    Take 1 tablet by mouth daily    LVAD (left ventricular assist device) present (H)       pantoprazole 40 MG EC tablet    PROTONIX    20 tablet    Take 1 tablet (40 mg) by mouth every morning    LVAD (left ventricular assist device) present (H)       potassium chloride SA 10 MEQ CR tablet    K-DUR/KLOR-CON M    180 tablet    Take 3 tablets (30 mEq) by mouth 2 times daily    Chronic systolic congestive heart failure (H)       pravastatin 20 MG tablet    PRAVACHOL    30 tablet    Take 1 tablet (20 mg) by mouth every evening    LVAD (left ventricular assist device) present (H)       psyllium Packet    METAMUCIL/KONSYL    30 each    Take 1 packet by mouth daily    Diarrhea, unspecified type       RESTASIS 0.05 % ophthalmic emulsion   Generic drug:  cycloSPORINE       Place 1 drop into both eyes 2 times daily        spironolactone 25 MG tablet    ALDACTONE    15 tablet    Take 0.5 tablets (12.5 mg) by mouth daily    LVAD (left ventricular assist device) present (H)       torsemide 20 MG tablet    DEMADEX    180 tablet    Take 3 tablets (60 mg) by mouth 2 times daily    Chronic systolic congestive heart failure (H)       VITAMIN E NATURAL PO      Take 400 Units by mouth daily        * Warfarin Therapy Reminder      1 each continuous prn    LVAD (left ventricular assist device) present (H)       * warfarin 2.5 MG tablet    COUMADIN    30 tablet    Resume prior to admission regimen.  With repeat INR on or before 12/14.    LVAD (left ventricular assist device) present (H)       * Notice:  This list has 4 medication(s) that are the same as other medications prescribed for you. Read the directions carefully, and ask your doctor or other care provider to review them with you.

## 2018-12-20 ENCOUNTER — OFFICE VISIT (OUTPATIENT)
Dept: ORTHOPEDICS CLINIC | Facility: CLINIC | Age: 33
End: 2018-12-20
Payer: COMMERCIAL

## 2018-12-20 DIAGNOSIS — Z47.89 AFTERCARE FOLLOWING SURGERY OF THE MUSCULOSKELETAL SYSTEM: Primary | ICD-10-CM

## 2018-12-20 PROCEDURE — 99024 POSTOP FOLLOW-UP VISIT: CPT | Performed by: ORTHOPAEDIC SURGERY

## 2018-12-20 PROCEDURE — 99212 OFFICE O/P EST SF 10 MIN: CPT | Performed by: ORTHOPAEDIC SURGERY

## 2018-12-20 NOTE — PROGRESS NOTES
12/20/2018  Ricki Bombard  9/27/1985  26year old   female  Donaldo Willis MD    HPI:   Patient presents with:  Post-Op: right middle finger, index finger and thumb. pt says she has throbbing on fingers.       The patient complains of pain located right

## 2019-01-17 ENCOUNTER — OFFICE VISIT (OUTPATIENT)
Dept: ORTHOPEDICS CLINIC | Facility: CLINIC | Age: 34
End: 2019-01-17
Payer: COMMERCIAL

## 2019-01-17 DIAGNOSIS — Z47.89 AFTERCARE FOLLOWING SURGERY OF THE MUSCULOSKELETAL SYSTEM: Primary | ICD-10-CM

## 2019-01-17 PROCEDURE — 99212 OFFICE O/P EST SF 10 MIN: CPT | Performed by: ORTHOPAEDIC SURGERY

## 2019-01-17 PROCEDURE — 99024 POSTOP FOLLOW-UP VISIT: CPT | Performed by: ORTHOPAEDIC SURGERY

## 2019-01-17 NOTE — PROGRESS NOTES
1/17/2019  Shivani Double  9/27/1985  26year old   female  Tess Vargas MD    HPI:   Patient presents with:  Post-Op: 4 week f/u s/p right thumb, index and middle finger trigger release. States feels pain in middle finger and thumb.  also feels numbnes

## 2019-02-04 NOTE — TELEPHONE ENCOUNTER
Refill request for sumatriptan 50 mg, take 1 tab at onset of migraine, #9, 5 refills    LOV: 9/17/18  NOV: None

## 2019-02-05 ENCOUNTER — APPOINTMENT (OUTPATIENT)
Dept: OCCUPATIONAL MEDICINE | Facility: HOSPITAL | Age: 34
End: 2019-02-05
Attending: FAMILY MEDICINE
Payer: COMMERCIAL

## 2019-02-05 ENCOUNTER — APPOINTMENT (OUTPATIENT)
Dept: OCCUPATIONAL MEDICINE | Facility: HOSPITAL | Age: 34
End: 2019-02-05
Attending: ORTHOPAEDIC SURGERY
Payer: COMMERCIAL

## 2019-02-05 ENCOUNTER — TELEPHONE (OUTPATIENT)
Dept: OCCUPATIONAL MEDICINE | Facility: HOSPITAL | Age: 34
End: 2019-02-05

## 2019-02-06 RX ORDER — SUMATRIPTAN 50 MG/1
TABLET, FILM COATED ORAL
Qty: 9 TABLET | Refills: 5 | Status: SHIPPED | OUTPATIENT
Start: 2019-02-06 | End: 2019-03-05

## 2019-02-06 NOTE — PROGRESS NOTES
OCCUPATIONAL THERAPY UPPER EXTREMITY EVALUATION:   Referring Physician: Dr. Valdemar Parks  Date of onset: 2017  Diagnosis: S/P release of trigger thumb of right hand [M65.311], trigger index finger of right hand, trigger middle finger of right hand  Date of Se address the subcomponents of strength, ROM, and motor control to allow her the ability to regain the above-noted skills.   In agreement with FOTO score and clinical rationale this evaluation involved Moderate Complexity decision making due to 1-2 performanc ease with opening jars. Patient will increase right pinch strength as follows: lateral to 17 pounds, 3-point to 8 pounds, 2-point to 4 pounds for ease with food preparation.     Frequency / Duration: Patient will be seen for 2 x/week x6 weeks or a total of

## 2019-02-07 ENCOUNTER — OFFICE VISIT (OUTPATIENT)
Dept: OCCUPATIONAL MEDICINE | Facility: HOSPITAL | Age: 34
End: 2019-02-07
Attending: ORTHOPAEDIC SURGERY
Payer: COMMERCIAL

## 2019-02-07 DIAGNOSIS — Z47.89 AFTERCARE FOLLOWING SURGERY OF THE MUSCULOSKELETAL SYSTEM: ICD-10-CM

## 2019-02-07 PROCEDURE — 97166 OT EVAL MOD COMPLEX 45 MIN: CPT

## 2019-02-12 ENCOUNTER — APPOINTMENT (OUTPATIENT)
Dept: OCCUPATIONAL MEDICINE | Facility: HOSPITAL | Age: 34
End: 2019-02-12
Attending: ORTHOPAEDIC SURGERY
Payer: COMMERCIAL

## 2019-02-14 ENCOUNTER — OFFICE VISIT (OUTPATIENT)
Dept: OCCUPATIONAL MEDICINE | Facility: HOSPITAL | Age: 34
End: 2019-02-14
Attending: ORTHOPAEDIC SURGERY
Payer: COMMERCIAL

## 2019-02-14 DIAGNOSIS — Z47.89 AFTERCARE FOLLOWING SURGERY OF THE MUSCULOSKELETAL SYSTEM: ICD-10-CM

## 2019-02-14 PROCEDURE — 97140 MANUAL THERAPY 1/> REGIONS: CPT

## 2019-02-14 PROCEDURE — 97035 APP MDLTY 1+ULTRASOUND EA 15: CPT

## 2019-02-14 PROCEDURE — 97110 THERAPEUTIC EXERCISES: CPT

## 2019-02-14 NOTE — PROGRESS NOTES
Diagnosis: S/P release of trigger thumb of right hand [M65.311], trigger index finger of right hand, trigger middle finger of right hand     Type of Insurance: PPO     Authorized # of Visits:  12         Next MD visit: none scheduled  Fall Risk: standard

## 2019-02-15 ENCOUNTER — OFFICE VISIT (OUTPATIENT)
Dept: OBGYN CLINIC | Facility: CLINIC | Age: 34
End: 2019-02-15
Payer: COMMERCIAL

## 2019-02-15 VITALS
HEART RATE: 63 BPM | DIASTOLIC BLOOD PRESSURE: 80 MMHG | SYSTOLIC BLOOD PRESSURE: 123 MMHG | WEIGHT: 192 LBS | BODY MASS INDEX: 29 KG/M2

## 2019-02-15 DIAGNOSIS — Z30.431 IUD CHECK UP: ICD-10-CM

## 2019-02-15 DIAGNOSIS — G43.829 MENSTRUAL MIGRAINE WITHOUT STATUS MIGRAINOSUS, NOT INTRACTABLE: Primary | ICD-10-CM

## 2019-02-15 DIAGNOSIS — Z30.432 ENCOUNTER FOR REMOVAL OF INTRAUTERINE CONTRACEPTIVE DEVICE: ICD-10-CM

## 2019-02-15 PROCEDURE — 58301 REMOVE INTRAUTERINE DEVICE: CPT | Performed by: OBSTETRICS & GYNECOLOGY

## 2019-02-15 PROCEDURE — 99203 OFFICE O/P NEW LOW 30 MIN: CPT | Performed by: OBSTETRICS & GYNECOLOGY

## 2019-02-15 NOTE — PROCEDURES
IUD Removal     Consent signed. Indication: headaches    Procedure discussed with the patient in detail including indication, risks, benefits, alternatives and complications.     Pelvic Exam Findings:  Lesion description:  IUD strings seen from cervix    P

## 2019-02-15 NOTE — PROGRESS NOTES
Marii Brennan is a 26year old female [place] Patient's last menstrual period was 02/11/2019 (approximate).  Patient presents with:  Menstrual Problem: [facility] gave 10 days of provera & high dose motrin to treat spotting -- helped but now with 10 days of spot activity:        Days per week: Not on file        Minutes per session: Not on file      Stress: Not on file    Relationships      Social connections:        Talks on phone: Not on file        Gets together: Not on file        Attends Yazidism service: No 5-325 MG Oral Tab, Take 1-2 tablets by mouth every 6 (six) hours as needed. , Disp: 20 tablet, Rfl: 0  •  DiphenhydrAMINE HCl (BENADRYL) 25 MG Oral Tab, Take 25 mg by mouth every 6 (six) hours as needed for Itching., Disp: , Rfl:   •  Fluticasone Propionate motion  Uterus:   normal in size, contour, position, mobility, without tenderness  Adnexa:   normal without masses or tenderness  Perineum:   normal  Anus:    no hemorroids   Lymph node:   no inguinal lymph nodes    Assessment & Plan:    Alfredo Johnson was seen to

## 2019-02-19 ENCOUNTER — OFFICE VISIT (OUTPATIENT)
Dept: OCCUPATIONAL MEDICINE | Facility: HOSPITAL | Age: 34
End: 2019-02-19
Attending: ORTHOPAEDIC SURGERY
Payer: COMMERCIAL

## 2019-02-19 DIAGNOSIS — Z47.89 AFTERCARE FOLLOWING SURGERY OF THE MUSCULOSKELETAL SYSTEM: ICD-10-CM

## 2019-02-19 PROCEDURE — 97140 MANUAL THERAPY 1/> REGIONS: CPT

## 2019-02-19 PROCEDURE — 97110 THERAPEUTIC EXERCISES: CPT

## 2019-02-19 NOTE — PROGRESS NOTES
Diagnosis: S/P release of trigger thumb of right hand [M65.311], trigger index finger of right hand, trigger middle finger of right hand     Type of Insurance: PPO     Authorized # of Visits:  12         Next MD visit: none scheduled  Fall Risk: standard Continue with emphasis on pain management and strengthening.     Charges: MT, TEx2      Total Timed Treatment: 45 min  Total Treatment Time: 50 min

## 2019-02-21 ENCOUNTER — OFFICE VISIT (OUTPATIENT)
Dept: OCCUPATIONAL MEDICINE | Facility: HOSPITAL | Age: 34
End: 2019-02-21
Attending: ORTHOPAEDIC SURGERY
Payer: COMMERCIAL

## 2019-02-21 DIAGNOSIS — Z47.89 AFTERCARE FOLLOWING SURGERY OF THE MUSCULOSKELETAL SYSTEM: ICD-10-CM

## 2019-02-21 PROCEDURE — 97110 THERAPEUTIC EXERCISES: CPT

## 2019-02-21 PROCEDURE — 97140 MANUAL THERAPY 1/> REGIONS: CPT

## 2019-02-21 NOTE — PROGRESS NOTES
Diagnosis: S/P release of trigger thumb of right hand [M65.311], trigger index finger of right hand, trigger middle finger of right hand     Type of Insurance: PPO     Authorized # of Visits:  12         Next MD visit: none scheduled  Fall Risk: standard 2/10 for pain-free ADLs. Patient will increase right  strength to 45 pounds for ease with opening jars.  (Made progress toward)  Patient will increase right pinch strength as follows: lateral to 17 pounds, 3-point to 8 pounds, 2-point to 4 pounds for e

## 2019-02-22 ENCOUNTER — HOSPITAL ENCOUNTER (EMERGENCY)
Facility: HOSPITAL | Age: 34
Discharge: HOME | End: 2019-02-22
Payer: COMMERCIAL

## 2019-02-22 VITALS
BODY MASS INDEX: 28 KG/M2 | RESPIRATION RATE: 16 BRPM | WEIGHT: 185 LBS | HEART RATE: 58 BPM | TEMPERATURE: 98 F | SYSTOLIC BLOOD PRESSURE: 117 MMHG | DIASTOLIC BLOOD PRESSURE: 66 MMHG | OXYGEN SATURATION: 100 %

## 2019-02-22 DIAGNOSIS — G43.809 OTHER MIGRAINE WITHOUT STATUS MIGRAINOSUS, NOT INTRACTABLE: Primary | ICD-10-CM

## 2019-02-22 LAB — B-HCG UR QL: NEGATIVE

## 2019-02-22 PROCEDURE — 96375 TX/PRO/DX INJ NEW DRUG ADDON: CPT

## 2019-02-22 PROCEDURE — 99284 EMERGENCY DEPT VISIT MOD MDM: CPT

## 2019-02-22 PROCEDURE — 96365 THER/PROPH/DIAG IV INF INIT: CPT

## 2019-02-22 PROCEDURE — 81025 URINE PREGNANCY TEST: CPT

## 2019-02-22 RX ORDER — MAGNESIUM SULFATE HEPTAHYDRATE 40 MG/ML
2 INJECTION, SOLUTION INTRAVENOUS ONCE
Status: COMPLETED | OUTPATIENT
Start: 2019-02-22 | End: 2019-02-22

## 2019-02-22 RX ORDER — METHYLPREDNISOLONE 4 MG/1
TABLET ORAL
Qty: 1 PACKAGE | Refills: 0 | Status: SHIPPED | OUTPATIENT
Start: 2019-02-22 | End: 2019-02-27

## 2019-02-22 RX ORDER — DEXAMETHASONE SODIUM PHOSPHATE 10 MG/ML
10 INJECTION, SOLUTION INTRAMUSCULAR; INTRAVENOUS ONCE
Status: COMPLETED | OUTPATIENT
Start: 2019-02-22 | End: 2019-02-22

## 2019-02-22 RX ORDER — DIHYDROERGOTAMINE MESYLATE 1 MG/ML
0.5 INJECTION, SOLUTION INTRAMUSCULAR; INTRAVENOUS; SUBCUTANEOUS EVERY 12 HOURS
Status: CANCELLED | OUTPATIENT
Start: 2019-02-22 | End: 2019-02-25

## 2019-02-22 RX ORDER — METOCLOPRAMIDE HYDROCHLORIDE 5 MG/ML
10 INJECTION INTRAMUSCULAR; INTRAVENOUS ONCE
Status: COMPLETED | OUTPATIENT
Start: 2019-02-22 | End: 2019-02-22

## 2019-02-22 RX ORDER — METOCLOPRAMIDE HYDROCHLORIDE 5 MG/ML
10 INJECTION INTRAMUSCULAR; INTRAVENOUS EVERY 12 HOURS
Status: CANCELLED | OUTPATIENT
Start: 2019-02-22 | End: 2019-02-25

## 2019-02-22 RX ORDER — MAGNESIUM SULFATE 1 G/100ML
1 INJECTION INTRAVENOUS EVERY 12 HOURS
Status: CANCELLED | OUTPATIENT
Start: 2019-02-22

## 2019-02-22 RX ORDER — KETOROLAC TROMETHAMINE 30 MG/ML
30 INJECTION, SOLUTION INTRAMUSCULAR; INTRAVENOUS ONCE
Status: COMPLETED | OUTPATIENT
Start: 2019-02-22 | End: 2019-02-22

## 2019-02-22 RX ORDER — DIPHENHYDRAMINE HYDROCHLORIDE 50 MG/ML
50 INJECTION INTRAMUSCULAR; INTRAVENOUS ONCE
Status: COMPLETED | OUTPATIENT
Start: 2019-02-22 | End: 2019-02-22

## 2019-02-22 NOTE — ED INITIAL ASSESSMENT (HPI)
Triage:  Patient c/o intermittent headaches all week. States taking imitrex without relief. Also took ibuprofen. Also c/o blood in stool since last night. +photophobia. No fever, no neck pain.

## 2019-02-23 NOTE — ED PROVIDER NOTES
Patient Seen in: [facility] AND [facility] Emergency Department    History   CC: headache  HPI: Marty Charles 26year old female  who presents to the ER c/o headaches intermittent x2 wks w/o known exacerbating or relieving factors.  States she has been battling MG Oral Tab,  1-2 TABLETS AT ONSET OF SEVERE HEADACHE, MAY REPEAT IN 1 HOUR NO MORE THAN 4 TABLETS IN 24 HOURS   HYDROcodone-acetaminophen 5-325 MG Oral Tab,  Take 1-2 tablets by mouth every 6 (six) hours as needed.    DiphenhydrAMINE HCl (BENADRYL) 25 MG O bilaterally and wob unlabored, good aeration with equal, even expansion bilaterally   CV - RRR  Skin - no rashes or petechiae noted, pink warm and dry throughout, mmm, no obvious signs of swelling/trauma/deformity, cap refill <2seconds  Neuro - A&O x4, 2+

## 2019-02-23 NOTE — ED NOTES
Oxygen inhalation started at 2LPM by nasal cannula as verbally ordered by [facility] APR[facility]

## 2019-02-25 ENCOUNTER — TELEPHONE (OUTPATIENT)
Dept: NEUROLOGY | Facility: CLINIC | Age: 34
End: 2019-02-25

## 2019-02-26 ENCOUNTER — OFFICE VISIT (OUTPATIENT)
Dept: OCCUPATIONAL MEDICINE | Facility: HOSPITAL | Age: 34
End: 2019-02-26
Attending: ORTHOPAEDIC SURGERY
Payer: COMMERCIAL

## 2019-02-26 DIAGNOSIS — Z47.89 AFTERCARE FOLLOWING SURGERY OF THE MUSCULOSKELETAL SYSTEM: ICD-10-CM

## 2019-02-26 PROCEDURE — 97110 THERAPEUTIC EXERCISES: CPT

## 2019-02-26 PROCEDURE — 97140 MANUAL THERAPY 1/> REGIONS: CPT

## 2019-02-26 NOTE — PROGRESS NOTES
Diagnosis: S/P release of trigger thumb of right hand [M65.311], trigger index finger of right hand, trigger middle finger of right hand     Type of Insurance: PPO     Authorized # of Visits:  12         Next MD visit: none scheduled  Fall Risk: standard to 2/10 for pain-free ADLs. Patient will increase right  strength to 45 pounds for ease with opening jars.  (Made progress toward)  Patient will increase right pinch strength as follows: lateral to 17 pounds, 3-point to 8 pounds, 2-point to 4 pounds fo

## 2019-02-28 ENCOUNTER — OFFICE VISIT (OUTPATIENT)
Dept: OCCUPATIONAL MEDICINE | Facility: HOSPITAL | Age: 34
End: 2019-02-28
Attending: ORTHOPAEDIC SURGERY
Payer: COMMERCIAL

## 2019-02-28 DIAGNOSIS — Z47.89 AFTERCARE FOLLOWING SURGERY OF THE MUSCULOSKELETAL SYSTEM: ICD-10-CM

## 2019-02-28 PROCEDURE — 97110 THERAPEUTIC EXERCISES: CPT

## 2019-02-28 PROCEDURE — 97140 MANUAL THERAPY 1/> REGIONS: CPT

## 2019-02-28 NOTE — PROGRESS NOTES
Diagnosis: S/P release of trigger thumb of right hand [M65.311], trigger index finger of right hand, trigger middle finger of right hand     Type of Insurance: PPO     Authorized # of Visits:  12         Next MD visit: none scheduled  Fall Risk: standard and is now 4 pounds below  strength goal.    Goals:   Patient will be independent and compliant with comprehensive HEP to maintain progress achieved in OT. Patient will decrease complaints of pain at worst to 2/10 for pain-free ADLs.   Patient will inc

## 2019-03-05 ENCOUNTER — TELEPHONE (OUTPATIENT)
Dept: NEUROLOGY | Facility: CLINIC | Age: 34
End: 2019-03-05

## 2019-03-05 ENCOUNTER — OFFICE VISIT (OUTPATIENT)
Dept: NEUROLOGY | Facility: CLINIC | Age: 34
End: 2019-03-05
Payer: COMMERCIAL

## 2019-03-05 ENCOUNTER — OFFICE VISIT (OUTPATIENT)
Dept: OCCUPATIONAL MEDICINE | Facility: HOSPITAL | Age: 34
End: 2019-03-05
Attending: ORTHOPAEDIC SURGERY
Payer: COMMERCIAL

## 2019-03-05 VITALS
HEART RATE: 60 BPM | RESPIRATION RATE: 16 BRPM | SYSTOLIC BLOOD PRESSURE: 126 MMHG | BODY MASS INDEX: 28.04 KG/M2 | HEIGHT: 68 IN | DIASTOLIC BLOOD PRESSURE: 74 MMHG | WEIGHT: 185 LBS

## 2019-03-05 DIAGNOSIS — G43.119 INTRACTABLE MIGRAINE WITH AURA WITHOUT STATUS MIGRAINOSUS: Primary | ICD-10-CM

## 2019-03-05 PROCEDURE — 99214 OFFICE O/P EST MOD 30 MIN: CPT | Performed by: OTHER

## 2019-03-05 PROCEDURE — 97140 MANUAL THERAPY 1/> REGIONS: CPT

## 2019-03-05 PROCEDURE — 97110 THERAPEUTIC EXERCISES: CPT

## 2019-03-05 RX ORDER — SUMATRIPTAN 100 MG/1
TABLET, FILM COATED ORAL
Qty: 9 TABLET | Refills: 3 | Status: SHIPPED | OUTPATIENT
Start: 2019-03-05 | End: 2021-02-24

## 2019-03-05 RX ORDER — KETOROLAC TROMETHAMINE 10 MG/1
TABLET, FILM COATED ORAL
Qty: 30 TABLET | Refills: 3 | Status: SHIPPED | OUTPATIENT
Start: 2019-03-05 | End: 2021-02-24

## 2019-03-05 RX ORDER — TOPIRAMATE 50 MG/1
TABLET, FILM COATED ORAL
Qty: 30 TABLET | Refills: 3 | Status: SHIPPED | OUTPATIENT
Start: 2019-03-05 | End: 2019-07-10

## 2019-03-05 RX ORDER — TOPIRAMATE 25 MG/1
TABLET ORAL
Qty: 60 TABLET | Refills: 3 | Status: SHIPPED | OUTPATIENT
Start: 2019-03-05 | End: 2019-03-21

## 2019-03-05 NOTE — PROGRESS NOTES
Neurology Outpatient Progress Note    Misa Paredes : 1985   HPI:     Misa Paredes is a 26year old female who is being seen in follow up. I saw her in 2018. She comes w/ her mom to visit today.     Since last visit, she has unfortunately spray by Nasal route daily.  Disp: 1 Bottle Rfl: 11      Past Medical History:   Diagnosis Date   • Migraines    • Multiple allergies 2013    Allergies: spt + t,rw,w,m,c,d,cr,f 2013      Past Surgical History:   Procedure Laterality Date   • FINGER TRIGGER movements intact; face symmetric, hearing intact, no dysarthria or dysphonia  Motor: 5/5 strength proximally and distally; normal bulk and tone; no drift  Reflexes: DTRs 2+ in bilateral biceps, brachioradialis, patella  Coordination / gait: no finger-nose-

## 2019-03-05 NOTE — TELEPHONE ENCOUNTER
Spoke to pharmacist. She states that topiramate 25 mg is on backorder and has been for a couple months now. Requesting to change to topiramate 50 mg, take 1/2 tab for 1 week and then 1 tab at bedtime. Please advise.

## 2019-03-05 NOTE — TELEPHONE ENCOUNTER
Orders placed for infusion via infusion center:     2 Liters normal saline over 1 hour, 1 gram IV Depacon, 2 grams IVPB magnesium sulfate, 30 mg IV kerorolac, 10 mg IV reglan

## 2019-03-06 NOTE — PROGRESS NOTES
Diagnosis: S/P release of trigger thumb of right hand [M65.311], trigger index finger of right hand, trigger middle finger of right hand     Type of Insurance: PPO     Authorized # of Visits:  12         Next MD visit: none scheduled  Fall Risk: standard reps Green x20 reps        Assessment: Patient tolerated increase to gripper resistance well. Goals:   Patient will be independent and compliant with comprehensive HEP to maintain progress achieved in OT.   Patient will decrease complaints of pain at wor

## 2019-03-07 ENCOUNTER — OFFICE VISIT (OUTPATIENT)
Dept: OCCUPATIONAL MEDICINE | Facility: HOSPITAL | Age: 34
End: 2019-03-07
Attending: ORTHOPAEDIC SURGERY
Payer: COMMERCIAL

## 2019-03-07 PROCEDURE — 97140 MANUAL THERAPY 1/> REGIONS: CPT

## 2019-03-07 PROCEDURE — 97110 THERAPEUTIC EXERCISES: CPT

## 2019-03-07 NOTE — PROGRESS NOTES
Diagnosis: S/P release of trigger thumb of right hand [M65.311], trigger index finger of right hand, trigger middle finger of right hand     Type of Insurance: PPO     Authorized # of Visits:  12         Next MD visit: none scheduled  Fall Risk: standard reps Red x20 reps Green x20 reps Green x20 reps Green x20 reps   Putty: digit extension   Red x20 reps Red x20 reps Green x20 reps Green x20 reps Green x20 reps   NP = not performed    Assessment: Decreased gripper resistance due to pain complaints followi

## 2019-03-08 ENCOUNTER — OFFICE VISIT (OUTPATIENT)
Dept: HEMATOLOGY/ONCOLOGY | Facility: HOSPITAL | Age: 34
End: 2019-03-08
Attending: Other
Payer: COMMERCIAL

## 2019-03-08 VITALS
SYSTOLIC BLOOD PRESSURE: 116 MMHG | RESPIRATION RATE: 16 BRPM | OXYGEN SATURATION: 99 % | HEART RATE: 70 BPM | DIASTOLIC BLOOD PRESSURE: 67 MMHG

## 2019-03-08 DIAGNOSIS — G43.119 INTRACTABLE MIGRAINE WITH AURA WITHOUT STATUS MIGRAINOSUS: Primary | ICD-10-CM

## 2019-03-08 PROCEDURE — 96367 TX/PROPH/DG ADDL SEQ IV INF: CPT

## 2019-03-08 PROCEDURE — 96375 TX/PRO/DX INJ NEW DRUG ADDON: CPT

## 2019-03-08 PROCEDURE — 96361 HYDRATE IV INFUSION ADD-ON: CPT

## 2019-03-08 PROCEDURE — 96365 THER/PROPH/DIAG IV INF INIT: CPT

## 2019-03-08 RX ORDER — METOCLOPRAMIDE HYDROCHLORIDE 5 MG/ML
10 INJECTION INTRAMUSCULAR; INTRAVENOUS ONCE
Status: COMPLETED | OUTPATIENT
Start: 2019-03-08 | End: 2019-03-08

## 2019-03-08 RX ORDER — SODIUM CHLORIDE 9 MG/ML
INJECTION, SOLUTION INTRAVENOUS
Status: DISCONTINUED
Start: 2019-03-08 | End: 2019-03-08

## 2019-03-08 RX ORDER — KETOROLAC TROMETHAMINE 30 MG/ML
30 INJECTION, SOLUTION INTRAMUSCULAR; INTRAVENOUS ONCE
Status: CANCELLED
Start: 2019-03-08 | End: 2019-03-08

## 2019-03-08 RX ORDER — KETOROLAC TROMETHAMINE 30 MG/ML
30 INJECTION, SOLUTION INTRAMUSCULAR; INTRAVENOUS ONCE
Status: COMPLETED | OUTPATIENT
Start: 2019-03-08 | End: 2019-03-08

## 2019-03-08 RX ORDER — MAGNESIUM SULFATE HEPTAHYDRATE 40 MG/ML
2 INJECTION, SOLUTION INTRAVENOUS ONCE
Status: COMPLETED | OUTPATIENT
Start: 2019-03-08 | End: 2019-03-08

## 2019-03-08 RX ORDER — MAGNESIUM SULFATE HEPTAHYDRATE 40 MG/ML
INJECTION, SOLUTION INTRAVENOUS
Status: DISCONTINUED
Start: 2019-03-08 | End: 2019-03-08

## 2019-03-08 RX ORDER — METOCLOPRAMIDE HYDROCHLORIDE 5 MG/ML
INJECTION INTRAMUSCULAR; INTRAVENOUS
Status: COMPLETED
Start: 2019-03-08 | End: 2019-03-08

## 2019-03-08 RX ORDER — METOCLOPRAMIDE HYDROCHLORIDE 5 MG/ML
10 INJECTION INTRAMUSCULAR; INTRAVENOUS ONCE
Status: CANCELLED
Start: 2019-03-08 | End: 2019-03-08

## 2019-03-08 RX ORDER — MAGNESIUM SULFATE HEPTAHYDRATE 40 MG/ML
2 INJECTION, SOLUTION INTRAVENOUS ONCE
Status: CANCELLED
Start: 2019-03-08 | End: 2019-03-08

## 2019-03-08 RX ADMIN — KETOROLAC TROMETHAMINE 30 MG: 30 INJECTION, SOLUTION INTRAMUSCULAR; INTRAVENOUS at 14:09:00

## 2019-03-08 RX ADMIN — MAGNESIUM SULFATE HEPTAHYDRATE 2 G: 40 INJECTION, SOLUTION INTRAVENOUS at 14:16:00

## 2019-03-08 RX ADMIN — METOCLOPRAMIDE HYDROCHLORIDE 10 MG: 5 INJECTION INTRAMUSCULAR; INTRAVENOUS at 14:12:00

## 2019-03-12 ENCOUNTER — OFFICE VISIT (OUTPATIENT)
Dept: OCCUPATIONAL MEDICINE | Facility: HOSPITAL | Age: 34
End: 2019-03-12
Attending: ORTHOPAEDIC SURGERY
Payer: COMMERCIAL

## 2019-03-12 PROCEDURE — 97140 MANUAL THERAPY 1/> REGIONS: CPT

## 2019-03-12 PROCEDURE — 97110 THERAPEUTIC EXERCISES: CPT

## 2019-03-14 ENCOUNTER — OFFICE VISIT (OUTPATIENT)
Dept: OCCUPATIONAL MEDICINE | Facility: HOSPITAL | Age: 34
End: 2019-03-14
Attending: ORTHOPAEDIC SURGERY
Payer: COMMERCIAL

## 2019-03-14 PROCEDURE — 97140 MANUAL THERAPY 1/> REGIONS: CPT

## 2019-03-14 PROCEDURE — 97110 THERAPEUTIC EXERCISES: CPT

## 2019-03-14 NOTE — PROGRESS NOTES
Diagnosis: S/P release of trigger thumb of right hand [M65.311], trigger index finger of right hand, trigger middle finger of right hand     Type of Insurance: PPO     Authorized # of Visits:  12         Next MD visit: none scheduled  Fall Risk: standard next week.     Charges: MT, TEx2      Total Timed Treatment: 45 min  Total Treatment Time: 50 min

## 2019-03-19 ENCOUNTER — OFFICE VISIT (OUTPATIENT)
Dept: OCCUPATIONAL MEDICINE | Facility: HOSPITAL | Age: 34
End: 2019-03-19
Attending: FAMILY MEDICINE
Payer: COMMERCIAL

## 2019-03-19 PROCEDURE — 97110 THERAPEUTIC EXERCISES: CPT

## 2019-03-19 PROCEDURE — 97140 MANUAL THERAPY 1/> REGIONS: CPT

## 2019-03-20 NOTE — PROGRESS NOTES
Diagnosis: S/P release of trigger thumb of right hand [M65.311], trigger index finger of right hand, trigger middle finger of right hand     Type of Insurance: PPO     Authorized # of Visits:  12         Next MD visit: none scheduled  Fall Risk: standard 4 pounds for ease with food preparation. Plan: Re-eval next visit.     Charges: MT, TEx2      Total Timed Treatment: 45 min  Total Treatment Time: 50 min

## 2019-03-21 ENCOUNTER — OFFICE VISIT (OUTPATIENT)
Dept: OPTOMETRY | Facility: CLINIC | Age: 34
End: 2019-03-21
Payer: COMMERCIAL

## 2019-03-21 ENCOUNTER — OFFICE VISIT (OUTPATIENT)
Dept: OCCUPATIONAL MEDICINE | Facility: HOSPITAL | Age: 34
End: 2019-03-21
Attending: ORTHOPAEDIC SURGERY
Payer: COMMERCIAL

## 2019-03-21 ENCOUNTER — APPOINTMENT (OUTPATIENT)
Dept: OCCUPATIONAL MEDICINE | Facility: HOSPITAL | Age: 34
End: 2019-03-21
Attending: FAMILY MEDICINE
Payer: COMMERCIAL

## 2019-03-21 DIAGNOSIS — G43.119 INTRACTABLE MIGRAINE WITH AURA WITHOUT STATUS MIGRAINOSUS: Primary | ICD-10-CM

## 2019-03-21 PROCEDURE — 92004 COMPRE OPH EXAM NEW PT 1/>: CPT | Performed by: OPTOMETRIST

## 2019-03-21 PROCEDURE — 97110 THERAPEUTIC EXERCISES: CPT

## 2019-03-21 PROCEDURE — 97140 MANUAL THERAPY 1/> REGIONS: CPT

## 2019-03-21 RX ORDER — SUMATRIPTAN 50 MG/1
TABLET, FILM COATED ORAL
COMMUNITY
Start: 2019-03-08 | End: 2019-03-28

## 2019-03-21 NOTE — PROGRESS NOTES
Patient Name: Charlie Wooten, : 1985, MRN: [id number]   Date:  3/21/2019  Referring Physician:  Linette Moraes    Diagnosis: S/P release of trigger thumb of right hand [M65.311], trigger index finger of right hand, trigger middle finger of rig progress achieved in OT. (Ongoing)  Patient will decrease complaints of pain at worst to 2/10 for pain-free ADLs. (Made progress toward)  Patient will increase right  strength to 45 pounds for ease with opening jars.  (Made progress toward)  Patient yoselin

## 2019-03-22 NOTE — PROGRESS NOTES
Poli Ng is a 26year old female. HPI:     HPI     Patient is in for a DFE. Her neurologist recommended an EE. She is under his care for migraine headaches--she is on Topamax, Imitrex and others. She had sinus surgery in 2015 to remove a benign juan to 1 tablets as needed for nausea associated with migraine, no more than 2 tablets in 24 hours Disp: 30 tablet Rfl: 1   Meloxicam 7.5 MG Oral Tab Take 1 tablet as needed for headache; no more than 2 tablets in 24 hours; take with food and water Disp: 45 ta Fundus Exam       Right Left    Disc Normal no swelling Normal no swelling    C/D Ratio 0.3 0.3    Macula Normal Normal    Vessels Normal Normal    Periphery Normal Normal            Refraction     Wearing Rx     Type:  No glasses          Manifest Refract

## 2019-03-22 NOTE — PATIENT INSTRUCTIONS
Intractable migraine with aura without status migrainosus  I advised patient that there is no optometric cause for migraine and that she should continue with her neurologist to follow up with tx and possible imaging

## 2019-03-22 NOTE — ASSESSMENT & PLAN NOTE
I advised patient that there is no optometric cause for migraine and that she should continue with her neurologist to follow up with tx and possible imaging

## 2019-03-22 NOTE — PROGRESS NOTES
Diagnosis: S/P release of trigger thumb of right hand [M65.311], trigger index finger of right hand, trigger middle finger of right hand     Type of Insurance: PPO     Authorized # of Visits:  12         Next MD visit: none scheduled  Fall Risk: standard to 45 pounds for ease with opening jars. (Made progress toward)  Patient will increase right pinch strength as follows: lateral to 17 pounds, 3-point to 8 pounds, 2-point to 4 pounds for ease with food preparation.  (Made progress toward)     Plan: Performe

## 2019-03-23 ENCOUNTER — HOSPITAL ENCOUNTER (EMERGENCY)
Facility: HOSPITAL | Age: 34
Discharge: HOME | End: 2019-03-23
Payer: COMMERCIAL

## 2019-03-23 VITALS
HEIGHT: 68 IN | RESPIRATION RATE: 18 BRPM | WEIGHT: 185 LBS | OXYGEN SATURATION: 93 % | TEMPERATURE: 99 F | BODY MASS INDEX: 28.04 KG/M2 | DIASTOLIC BLOOD PRESSURE: 74 MMHG | HEART RATE: 66 BPM | SYSTOLIC BLOOD PRESSURE: 118 MMHG

## 2019-03-23 DIAGNOSIS — IMO0002 CHRONIC MIGRAINE: Primary | ICD-10-CM

## 2019-03-23 DIAGNOSIS — N39.0 URINARY TRACT INFECTION WITHOUT HEMATURIA, SITE UNSPECIFIED: ICD-10-CM

## 2019-03-23 LAB
ANION GAP SERPL CALC-SCNC: 5 MMOL/L (ref 0–18)
B-HCG UR QL: NEGATIVE
BACTERIA UR QL AUTO: NEGATIVE /HPF
BASOPHILS # BLD AUTO: 0 X10(3) UL (ref 0–0.2)
BASOPHILS NFR BLD AUTO: 0 %
BILIRUB UR QL: NEGATIVE
BUN BLD-MCNC: 13 MG/DL (ref 7–18)
BUN/CREAT SERPL: 15.3 (ref 10–20)
CALCIUM BLD-MCNC: 9.1 MG/DL (ref 8.5–10.1)
CHLORIDE SERPL-SCNC: 111 MMOL/L (ref 98–107)
CLARITY UR: CLEAR
CO2 SERPL-SCNC: 27 MMOL/L (ref 21–32)
COLOR UR: YELLOW
CREAT BLD-MCNC: 0.85 MG/DL (ref 0.55–1.02)
DEPRECATED RDW RBC AUTO: 43.4 FL (ref 35.1–46.3)
EOSINOPHIL # BLD AUTO: 0.05 X10(3) UL (ref 0–0.7)
EOSINOPHIL NFR BLD AUTO: 1.1 %
ERYTHROCYTE [DISTWIDTH] IN BLOOD BY AUTOMATED COUNT: 14.3 % (ref 11–15)
GLUCOSE BLD-MCNC: 82 MG/DL (ref 70–99)
GLUCOSE UR-MCNC: NEGATIVE MG/DL
HCT VFR BLD AUTO: 37.4 % (ref 35–48)
HGB BLD-MCNC: 11.8 G/DL (ref 12–16)
HGB UR QL STRIP.AUTO: NEGATIVE
IMM GRANULOCYTES # BLD AUTO: 0.01 X10(3) UL (ref 0–1)
IMM GRANULOCYTES NFR BLD: 0.2 %
KETONES UR-MCNC: NEGATIVE MG/DL
LYMPHOCYTES # BLD AUTO: 1.94 X10(3) UL (ref 1–4)
LYMPHOCYTES NFR BLD AUTO: 41 %
MCH RBC QN AUTO: 26.5 PG (ref 26–34)
MCHC RBC AUTO-ENTMCNC: 31.6 G/DL (ref 31–37)
MCV RBC AUTO: 83.9 FL (ref 80–100)
MONOCYTES # BLD AUTO: 0.53 X10(3) UL (ref 0.1–1)
MONOCYTES NFR BLD AUTO: 11.2 %
NEUTROPHILS # BLD AUTO: 2.2 X10 (3) UL (ref 1.5–7.7)
NEUTROPHILS # BLD AUTO: 2.2 X10(3) UL (ref 1.5–7.7)
NEUTROPHILS NFR BLD AUTO: 46.5 %
NITRITE UR QL STRIP.AUTO: NEGATIVE
OSMOLALITY SERPL CALC.SUM OF ELEC: 295 MOSM/KG (ref 275–295)
PH UR: 5 [PH] (ref 5–8)
PLATELET # BLD AUTO: 181 10(3)UL (ref 150–450)
POTASSIUM SERPL-SCNC: 3.7 MMOL/L (ref 3.5–5.1)
PROT UR-MCNC: NEGATIVE MG/DL
RBC # BLD AUTO: 4.46 X10(6)UL (ref 3.8–5.3)
RBC #/AREA URNS AUTO: 2 /HPF
SODIUM SERPL-SCNC: 143 MMOL/L (ref 136–145)
SP GR UR STRIP: 1.01 (ref 1–1.03)
UROBILINOGEN UR STRIP-ACNC: <2
VIT C UR-MCNC: NEGATIVE MG/DL
WBC # BLD AUTO: 4.7 X10(3) UL (ref 4–11)
WBC #/AREA URNS AUTO: 53 /HPF

## 2019-03-23 PROCEDURE — 85025 COMPLETE CBC W/AUTO DIFF WBC: CPT | Performed by: NURSE PRACTITIONER

## 2019-03-23 PROCEDURE — 96375 TX/PRO/DX INJ NEW DRUG ADDON: CPT

## 2019-03-23 PROCEDURE — 96374 THER/PROPH/DIAG INJ IV PUSH: CPT

## 2019-03-23 PROCEDURE — 99284 EMERGENCY DEPT VISIT MOD MDM: CPT

## 2019-03-23 PROCEDURE — 80048 BASIC METABOLIC PNL TOTAL CA: CPT | Performed by: NURSE PRACTITIONER

## 2019-03-23 PROCEDURE — 81025 URINE PREGNANCY TEST: CPT

## 2019-03-23 PROCEDURE — 96361 HYDRATE IV INFUSION ADD-ON: CPT

## 2019-03-23 PROCEDURE — 87186 SC STD MICRODIL/AGAR DIL: CPT

## 2019-03-23 PROCEDURE — 87086 URINE CULTURE/COLONY COUNT: CPT

## 2019-03-23 PROCEDURE — 81001 URINALYSIS AUTO W/SCOPE: CPT

## 2019-03-23 PROCEDURE — 87077 CULTURE AEROBIC IDENTIFY: CPT

## 2019-03-23 RX ORDER — METOCLOPRAMIDE HYDROCHLORIDE 5 MG/ML
10 INJECTION INTRAMUSCULAR; INTRAVENOUS ONCE
Status: COMPLETED | OUTPATIENT
Start: 2019-03-23 | End: 2019-03-23

## 2019-03-23 RX ORDER — DEXAMETHASONE SODIUM PHOSPHATE 10 MG/ML
10 INJECTION, SOLUTION INTRAMUSCULAR; INTRAVENOUS ONCE
Status: COMPLETED | OUTPATIENT
Start: 2019-03-23 | End: 2019-03-23

## 2019-03-23 RX ORDER — DIPHENHYDRAMINE HYDROCHLORIDE 50 MG/ML
25 INJECTION INTRAMUSCULAR; INTRAVENOUS ONCE
Status: COMPLETED | OUTPATIENT
Start: 2019-03-23 | End: 2019-03-23

## 2019-03-23 RX ORDER — KETOROLAC TROMETHAMINE 30 MG/ML
30 INJECTION, SOLUTION INTRAMUSCULAR; INTRAVENOUS ONCE
Status: COMPLETED | OUTPATIENT
Start: 2019-03-23 | End: 2019-03-23

## 2019-03-23 RX ORDER — MAGNESIUM SULFATE HEPTAHYDRATE 40 MG/ML
2 INJECTION, SOLUTION INTRAVENOUS ONCE
Status: DISCONTINUED | OUTPATIENT
Start: 2019-03-23 | End: 2019-03-23

## 2019-03-23 RX ORDER — NITROFURANTOIN 25; 75 MG/1; MG/1
100 CAPSULE ORAL 2 TIMES DAILY
Qty: 10 CAPSULE | Refills: 0 | Status: SHIPPED | OUTPATIENT
Start: 2019-03-23 | End: 2019-03-28 | Stop reason: ALTCHOICE

## 2019-03-23 NOTE — ED NOTES
Pt presents for migraine x 4 days. Pt has hx of migraines, sees Dr. Gómez Segovia. Pt states she had infusion with Dr. Gómez Segovia approximately 4 weeks ago. Pt states migraine has continued despite Topamax, Toradol, Mobic at home. +Photophobia. No fevers.  No neck p

## 2019-03-23 NOTE — ED PROVIDER NOTES
Patient Seen in: [facility] AND [facility] Emergency Department    History   Patient presents with:  Headache (neurologic)    Stated Complaint: migraine    HPI    Fajardo Sergio is a 26year old female who is being seen in the emergency room for a migraine heada air)       Current:/74   Pulse 66   Temp 98.6 °F (37 °C) (Oral)   Resp 18   Ht 172.7 cm (5' 8\")   Wt 83.9 kg   LMP  (Within Weeks)   SpO2 93%   BMI 28.13 kg/m²         Physical Exam    General: Awake and alert in moderate distress secondary to pain. Please view results for these tests on the individual orders.    RAINBOW DRAW BLUE   RAINBOW DRAW LAVENDER   RAINBOW DRAW LIGHT GREEN   RAINBOW DRAW GOLD   URINE CULTURE, ROUTINE        54 WBC in urine- no symptoms - will send for culture  Labs u

## 2019-03-24 ENCOUNTER — TELEPHONE (OUTPATIENT)
Dept: NEUROLOGY | Facility: CLINIC | Age: 34
End: 2019-03-24

## 2019-03-26 DIAGNOSIS — G43.111 INTRACTABLE MIGRAINE WITH AURA WITH STATUS MIGRAINOSUS: Primary | ICD-10-CM

## 2019-03-26 DIAGNOSIS — G43.119 INTRACTABLE MIGRAINE WITH AURA WITHOUT STATUS MIGRAINOSUS: Primary | ICD-10-CM

## 2019-03-26 NOTE — TELEPHONE ENCOUNTER
Spoke with patient regarding her questions and concerns. She does not feel topiramate significantly helping. Interval note from ED reviewed. Offered Medrol Dosepak, she says this did not help her before.   She inquired about Aimovig and daily Depakote; I

## 2019-03-27 NOTE — TELEPHONE ENCOUNTER
Received call from Deana Frankel at [facility]fusion [facility] stating that they need prescription form for medications ordered. Imani faxed over blank form, will review with Dr. Asha Elizalde and fax back to infusion center.

## 2019-03-28 ENCOUNTER — OFFICE VISIT (OUTPATIENT)
Dept: INTERNAL MEDICINE CLINIC | Facility: CLINIC | Age: 34
End: 2019-03-28
Payer: COMMERCIAL

## 2019-03-28 VITALS
BODY MASS INDEX: 29.7 KG/M2 | DIASTOLIC BLOOD PRESSURE: 89 MMHG | WEIGHT: 196 LBS | TEMPERATURE: 99 F | HEIGHT: 68 IN | SYSTOLIC BLOOD PRESSURE: 124 MMHG | HEART RATE: 101 BPM

## 2019-03-28 DIAGNOSIS — G43.119 INTRACTABLE MIGRAINE WITH AURA WITHOUT STATUS MIGRAINOSUS: Primary | ICD-10-CM

## 2019-03-28 DIAGNOSIS — E55.9 VITAMIN D DEFICIENCY: ICD-10-CM

## 2019-03-28 PROBLEM — G43.829 MENSTRUAL MIGRAINE WITHOUT STATUS MIGRAINOSUS, NOT INTRACTABLE: Status: RESOLVED | Noted: 2019-02-15 | Resolved: 2019-03-28

## 2019-03-28 PROCEDURE — 99203 OFFICE O/P NEW LOW 30 MIN: CPT | Performed by: INTERNAL MEDICINE

## 2019-03-28 RX ORDER — CHOLECALCIFEROL (VITAMIN D3) 1250 MCG
1 CAPSULE ORAL WEEKLY
Refills: 0 | COMMUNITY
Start: 2019-03-22 | End: 2021-02-24

## 2019-03-28 NOTE — PROGRESS NOTES
Poli Ng is a 26year old female. Patient presents with:  Establish Care: needs PCP.  pt states temp has been going up and down, chills   Migraine: frequent, sees neuro but has other concerns    HPI:   33-year-old lady with a past medical history of mi with migraine, no more than 2 tablets in 24 hours Disp: 30 tablet Rfl: 1   Meloxicam 7.5 MG Oral Tab Take 1 tablet as needed for headache; no more than 2 tablets in 24 hours; take with food and water Disp: 45 tablet Rfl: 1   Fluticasone Propionate (FLONASE for chest pain. Gastrointestinal: Negative for vomiting, abdominal pain and abdominal distention. Genitourinary: Negative for hematuria. Skin: Negative for wound. Neurological: Positive for headaches.    Psychiatric/Behavioral: Negative for behavior patient indicates understanding of these issues and agrees to the plan. No Follow-up on file.

## 2019-03-28 NOTE — PATIENT INSTRUCTIONS
Please proceed with infusion tomorrow as scheduled. Please continue to follow-up with with a neurologist.  I will see you back in couple of months as a follow-up.   Thank you

## 2019-03-29 ENCOUNTER — OFFICE VISIT (OUTPATIENT)
Dept: HEMATOLOGY/ONCOLOGY | Facility: HOSPITAL | Age: 34
End: 2019-03-29
Attending: Other
Payer: COMMERCIAL

## 2019-03-29 VITALS
SYSTOLIC BLOOD PRESSURE: 114 MMHG | OXYGEN SATURATION: 95 % | RESPIRATION RATE: 16 BRPM | TEMPERATURE: 98 F | HEART RATE: 72 BPM | DIASTOLIC BLOOD PRESSURE: 78 MMHG

## 2019-03-29 DIAGNOSIS — G43.119 INTRACTABLE MIGRAINE WITH AURA WITHOUT STATUS MIGRAINOSUS: Primary | ICD-10-CM

## 2019-03-29 PROCEDURE — 96365 THER/PROPH/DIAG IV INF INIT: CPT

## 2019-03-29 PROCEDURE — 96367 TX/PROPH/DG ADDL SEQ IV INF: CPT

## 2019-03-29 PROCEDURE — 96375 TX/PRO/DX INJ NEW DRUG ADDON: CPT

## 2019-03-29 RX ORDER — KETOROLAC TROMETHAMINE 30 MG/ML
30 INJECTION, SOLUTION INTRAMUSCULAR; INTRAVENOUS ONCE
Status: CANCELLED
Start: 2019-03-29 | End: 2019-03-29

## 2019-03-29 RX ORDER — SODIUM CHLORIDE 9 MG/ML
INJECTION, SOLUTION INTRAVENOUS
Status: DISCONTINUED
Start: 2019-03-29 | End: 2019-03-29

## 2019-03-29 RX ORDER — 0.9 % SODIUM CHLORIDE 0.9 %
VIAL (ML) INJECTION
Status: DISCONTINUED
Start: 2019-03-29 | End: 2019-03-29

## 2019-03-29 RX ORDER — SODIUM CHLORIDE 9 MG/ML
INJECTION, SOLUTION INTRAVENOUS
Status: COMPLETED
Start: 2019-03-29 | End: 2019-03-29

## 2019-03-29 RX ORDER — METOCLOPRAMIDE HYDROCHLORIDE 5 MG/ML
INJECTION INTRAMUSCULAR; INTRAVENOUS
Status: COMPLETED
Start: 2019-03-29 | End: 2019-03-29

## 2019-03-29 RX ORDER — MAGNESIUM SULFATE HEPTAHYDRATE 40 MG/ML
2 INJECTION, SOLUTION INTRAVENOUS ONCE
Status: COMPLETED | OUTPATIENT
Start: 2019-03-29 | End: 2019-03-29

## 2019-03-29 RX ORDER — MAGNESIUM SULFATE HEPTAHYDRATE 40 MG/ML
INJECTION, SOLUTION INTRAVENOUS
Status: COMPLETED
Start: 2019-03-29 | End: 2019-03-29

## 2019-03-29 RX ORDER — KETOROLAC TROMETHAMINE 30 MG/ML
30 INJECTION, SOLUTION INTRAMUSCULAR; INTRAVENOUS ONCE
Status: COMPLETED | OUTPATIENT
Start: 2019-03-29 | End: 2019-03-29

## 2019-03-29 RX ORDER — MAGNESIUM SULFATE HEPTAHYDRATE 40 MG/ML
2 INJECTION, SOLUTION INTRAVENOUS ONCE
Status: CANCELLED
Start: 2019-03-29 | End: 2019-03-29

## 2019-03-29 RX ORDER — METOCLOPRAMIDE HYDROCHLORIDE 5 MG/ML
10 INJECTION INTRAMUSCULAR; INTRAVENOUS ONCE
Status: CANCELLED
Start: 2019-03-29 | End: 2019-03-29

## 2019-03-29 RX ORDER — METOCLOPRAMIDE HYDROCHLORIDE 5 MG/ML
10 INJECTION INTRAMUSCULAR; INTRAVENOUS ONCE
Status: COMPLETED | OUTPATIENT
Start: 2019-03-29 | End: 2019-03-29

## 2019-03-29 RX ADMIN — KETOROLAC TROMETHAMINE 30 MG: 30 INJECTION, SOLUTION INTRAMUSCULAR; INTRAVENOUS at 13:31:00

## 2019-03-29 RX ADMIN — METOCLOPRAMIDE HYDROCHLORIDE 10 MG: 5 INJECTION INTRAMUSCULAR; INTRAVENOUS at 13:13:00

## 2019-03-29 RX ADMIN — SODIUM CHLORIDE 2000 ML: 9 INJECTION, SOLUTION INTRAVENOUS at 13:13:00

## 2019-03-29 RX ADMIN — MAGNESIUM SULFATE HEPTAHYDRATE 2 G: 40 INJECTION, SOLUTION INTRAVENOUS at 13:17:00

## 2019-03-29 NOTE — PROGRESS NOTES
Patient arrives for 2 liters of NS, depacon, 2 g magnesium rider, Toradol, and reglan for migraine. Patient reports she has been having a migraine for the \"past few months\". Reports today the light is bothering her and she is having some nausea.  Patient

## 2019-04-01 ENCOUNTER — OFFICE VISIT (OUTPATIENT)
Dept: OTOLARYNGOLOGY | Facility: CLINIC | Age: 34
End: 2019-04-01
Payer: COMMERCIAL

## 2019-04-01 VITALS
WEIGHT: 185 LBS | DIASTOLIC BLOOD PRESSURE: 68 MMHG | SYSTOLIC BLOOD PRESSURE: 110 MMHG | BODY MASS INDEX: 28.04 KG/M2 | TEMPERATURE: 99 F | HEIGHT: 68 IN

## 2019-04-01 DIAGNOSIS — J32.9 SINUSITIS, UNSPECIFIED CHRONICITY, UNSPECIFIED LOCATION: Primary | ICD-10-CM

## 2019-04-01 PROCEDURE — 99212 OFFICE O/P EST SF 10 MIN: CPT | Performed by: OTOLARYNGOLOGY

## 2019-04-01 PROCEDURE — 99203 OFFICE O/P NEW LOW 30 MIN: CPT | Performed by: OTOLARYNGOLOGY

## 2019-04-01 NOTE — PROGRESS NOTES
Juan Rice is a 26year old female.   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        Lymph Detail Normal Submental. Submandibular. Anterior cervical. Posterior cervical. Supraclavicular.               Nose/Mouth/Throat abNormal Nares - Right: Normal Left: Normal. Septum deviated with turbinate hyper her with the results      Rommel Mario MD

## 2019-04-03 ENCOUNTER — TELEPHONE (OUTPATIENT)
Dept: OTOLARYNGOLOGY | Facility: CLINIC | Age: 34
End: 2019-04-03

## 2019-04-03 NOTE — TELEPHONE ENCOUNTER
Called and spoke with the pt regarding approved prior authorization for CT of the sinus with case number [id number], approval number [id number] valid from 4/3/19 until 7/2/19, advised pt to call her insurance company to verify out of the pocket expenses

## 2019-04-07 ENCOUNTER — HOSPITAL ENCOUNTER (OUTPATIENT)
Dept: CT IMAGING | Facility: HOSPITAL | Age: 34
Discharge: HOME | End: 2019-04-07
Attending: OTOLARYNGOLOGY
Payer: COMMERCIAL

## 2019-04-07 DIAGNOSIS — J32.9 SINUSITIS, UNSPECIFIED CHRONICITY, UNSPECIFIED LOCATION: ICD-10-CM

## 2019-04-07 PROCEDURE — 70486 CT MAXILLOFACIAL W/O DYE: CPT | Performed by: OTOLARYNGOLOGY

## 2019-04-09 ENCOUNTER — PATIENT MESSAGE (OUTPATIENT)
Dept: NEUROLOGY | Facility: CLINIC | Age: 34
End: 2019-04-09

## 2019-04-09 NOTE — TELEPHONE ENCOUNTER
From: Bernice Moarn  To: Kamaljit Rendon MD  Sent: 4/9/2019 12:59 PM CDT  Subject: Visit Follow-up Question    Hi Dr. Mari Parks,    I continue to have almost daily headaches with low grade fevers after the last infusion treatment.  I also continue to take the

## 2019-04-10 ENCOUNTER — TELEPHONE (OUTPATIENT)
Dept: NEUROLOGY | Facility: CLINIC | Age: 34
End: 2019-04-10

## 2019-04-10 NOTE — TELEPHONE ENCOUNTER
Called [employer] for authorization of approval of MRA brain wo cpt code 99980 & MRI brain wo cpt code 14462. Talked to  Mali Tenorio. who initiated request. Reference # [id number], will fax clinical notes.  Faxed notes pending approval.

## 2019-04-15 NOTE — TELEPHONE ENCOUNTER
Spoke to patient and advised notes were faxed to insurance and awaiting approval.   Patient expressed understanding. Spoke to [id number] [employer] [place], gave approval for MRI authorization #[id number] and denied MRA brain. Will fax to office paperwork.   Called patient back

## 2019-04-16 NOTE — TELEPHONE ENCOUNTER
Received [employer] notification fax for [address] [place] [address] St code [id number] is denied.  After reviewing the information submitted, and the terms of the member benefit plan, it has been determined that the following imaging study or studies are not covered

## 2019-04-21 ENCOUNTER — HOSPITAL ENCOUNTER (OUTPATIENT)
Dept: MRI IMAGING | Facility: HOSPITAL | Age: 34
Discharge: HOME | End: 2019-04-21
Attending: Other
Payer: COMMERCIAL

## 2019-04-21 ENCOUNTER — APPOINTMENT (OUTPATIENT)
Dept: MRI IMAGING | Facility: HOSPITAL | Age: 34
End: 2019-04-21
Attending: Other
Payer: COMMERCIAL

## 2019-04-21 DIAGNOSIS — G43.119 INTRACTABLE MIGRAINE WITH AURA WITHOUT STATUS MIGRAINOSUS: ICD-10-CM

## 2019-04-21 PROCEDURE — 70551 MRI BRAIN STEM W/O DYE: CPT | Performed by: OTHER

## 2019-04-25 ENCOUNTER — OFFICE VISIT (OUTPATIENT)
Dept: ORTHOPEDICS CLINIC | Facility: CLINIC | Age: 34
End: 2019-04-25
Payer: COMMERCIAL

## 2019-04-25 DIAGNOSIS — Z47.89 AFTERCARE FOLLOWING SURGERY OF THE MUSCULOSKELETAL SYSTEM: Primary | ICD-10-CM

## 2019-04-25 PROCEDURE — 99213 OFFICE O/P EST LOW 20 MIN: CPT | Performed by: ORTHOPAEDIC SURGERY

## 2019-04-25 PROCEDURE — 99212 OFFICE O/P EST SF 10 MIN: CPT | Performed by: ORTHOPAEDIC SURGERY

## 2019-04-25 NOTE — PROGRESS NOTES
4/25/2019  Joaquin Alonso  9/27/1985  26year old   female  Silvia Mckinley MD    HPI:   Patient presents with:  Post-Op: F/u on right thumb, idex and middle finger trigger relase. Stts she had stopped going to PT b/c they said that she wasn't improving.

## 2019-05-09 ENCOUNTER — MED REC SCAN ONLY (OUTPATIENT)
Dept: NEUROLOGY | Facility: CLINIC | Age: 34
End: 2019-05-09

## 2019-09-17 ENCOUNTER — APPOINTMENT (OUTPATIENT)
Dept: GENERAL RADIOLOGY | Age: 34
End: 2019-09-17
Attending: NURSE PRACTITIONER
Payer: COMMERCIAL

## 2019-09-17 ENCOUNTER — HOSPITAL ENCOUNTER (OUTPATIENT)
Age: 34
Discharge: HOME | End: 2019-09-17
Payer: COMMERCIAL

## 2019-09-17 VITALS
OXYGEN SATURATION: 100 % | HEART RATE: 76 BPM | WEIGHT: 200 LBS | TEMPERATURE: 99 F | BODY MASS INDEX: 30.31 KG/M2 | DIASTOLIC BLOOD PRESSURE: 78 MMHG | SYSTOLIC BLOOD PRESSURE: 118 MMHG | HEIGHT: 68 IN | RESPIRATION RATE: 18 BRPM

## 2019-09-17 DIAGNOSIS — M79.632 PAIN OF LEFT FOREARM: Primary | ICD-10-CM

## 2019-09-17 PROCEDURE — 73090 X-RAY EXAM OF FOREARM: CPT | Performed by: NURSE PRACTITIONER

## 2019-09-17 PROCEDURE — 99213 OFFICE O/P EST LOW 20 MIN: CPT

## 2019-09-17 NOTE — ED INITIAL ASSESSMENT (HPI)
REPORTS MEDIAL ELBOW PAIN STARTING THIS AM UPON WAKING. DENIES ANY TRAUMA/INJURY BUT STATES SHE DID WORK OUT YESTERDAY.   DENIES HX OF PREVIOUS INJURY

## 2019-09-17 NOTE — ED PROVIDER NOTES
Patient presents with:  Arm Pain      HPI:     Sima Oneal is a 26year old female with no significant past medical history presents with chief complaint of left forearm pain.   Yesterday she did have the upper body workout and this morning woke up with Specific Question: What is the Relevant Clinical Indication / Reason for Exam?          Answer: left arm pain      Labs performed this visit:  No results found for this or any previous visit (from the past 10 hour(s)). Diagnosis:    ICD-10-CM    1.  Pain

## 2020-01-08 ENCOUNTER — OFFICE VISIT (OUTPATIENT)
Dept: OPHTHALMOLOGY | Facility: CLINIC | Age: 35
End: 2020-01-08
Payer: COMMERCIAL

## 2020-01-08 ENCOUNTER — NURSE TRIAGE (OUTPATIENT)
Dept: INTERNAL MEDICINE CLINIC | Facility: CLINIC | Age: 35
End: 2020-01-08

## 2020-01-08 ENCOUNTER — TELEPHONE (OUTPATIENT)
Dept: INTERNAL MEDICINE CLINIC | Facility: CLINIC | Age: 35
End: 2020-01-08

## 2020-01-08 DIAGNOSIS — B02.9 HERPES ZOSTER WITHOUT COMPLICATION: Primary | ICD-10-CM

## 2020-01-08 PROCEDURE — 99212 OFFICE O/P EST SF 10 MIN: CPT | Performed by: OPHTHALMOLOGY

## 2020-01-08 PROCEDURE — 99203 OFFICE O/P NEW LOW 30 MIN: CPT | Performed by: OPHTHALMOLOGY

## 2020-01-08 RX ORDER — VALACYCLOVIR HYDROCHLORIDE 1 G/1
TABLET, FILM COATED ORAL
COMMUNITY
Start: 2020-01-07 | End: 2021-02-24

## 2020-01-08 NOTE — TELEPHONE ENCOUNTER
Action Requested: Summary for Provider      []? Critical Lab, Recommendations Needed  []? Need Additional Advice  []? FYI    []? Need Orders  []? Need Medications Sent to Pharmacy  []?   Other      SUMMARY: Protocol advises ER for severe eye pain, spok

## 2020-01-08 NOTE — PROGRESS NOTES
Shivani Brady is a 37year old female. HPI:     HPI     Consult      Additional comments: Consult per Dr. Trung Wooten              Comments     Patient is here for an urgent visit.   She states she woke up on 1/06/20 with a small bump on her left eyebrow te to 1 tablet at onset of migraine, may repeat in 1 hour; no more than 4 tablets in 24 hours (Patient not taking: Reported on 1/8/2020 ) 9 tablet 3   • Ketorolac Tromethamine 10 MG Oral Tab 1 tablet as needed for headache, no more than 3 tablets in 24 hours; Left    Lids/Lashes Meibomian gland dysfunction Meibomian gland dysfunction    Conjunctiva/Sclera Normal Non-injected    Cornea Clear no pseudodendrites    Anterior Chamber Deep and quiet Deep and quiet    Iris Normal Normal    Lens Clear Clear    Vitreous

## 2020-01-08 NOTE — TELEPHONE ENCOUNTER
Action Requested: Summary for Provider     []  Critical Lab, Recommendations Needed  [] Need Additional Advice  [x]   FYI    []   Need Orders  [] Need Medications Sent to Pharmacy  []  Other     SUMMARY: Protocol advises ER for severe eye pain, spoke with

## 2020-01-08 NOTE — PATIENT INSTRUCTIONS
Herpes zoster without complication  Continue Valacyclovir as directed. There is no ocular involvement. Told patient to watch for symptoms of pain, redness, light sensitivity and blurry vision in the left eye.   Discussed if these symptoms are present, p once.  · After a few days, the blisters become dry and form a crust. The crust falls off in days to weeks. The blisters generally do not leave scars. How is shingles treated? For most people, shingles heals on its own in a few weeks.  But treatment is rec chickenpox in the past. Those who have never had chickenpox can get the virus from you. Although instead of developing shingles, the person may get chickenpox.  Until your blisters form scabs, avoid contact with others, especially the following:  · Pregnant sensitive or painful for weeks or months, gradually resolving over time. But, sometimes this can last longer and be permanent (called postherpetic neuralgia.)  Shingles vaccines are available. Vaccination can help prevent shingles or make it less painful. reserved. This information is not intended as a substitute for professional medical care. Always follow your healthcare professional's instructions.

## 2020-01-08 NOTE — TELEPHONE ENCOUNTER
Spoke with nurse and told her to tell Pt to come right over, we will work her into the schedule, she may have a long wait but will be seen this morning

## 2020-01-08 NOTE — ASSESSMENT & PLAN NOTE
Continue Valacyclovir as directed. There is no ocular involvement. Told patient to watch for symptoms of pain, redness, light sensitivity and blurry vision in the left eye.   Discussed if these symptoms are present, patient should call the office and sc

## 2020-01-10 ENCOUNTER — OFFICE VISIT (OUTPATIENT)
Dept: INTERNAL MEDICINE CLINIC | Facility: CLINIC | Age: 35
End: 2020-01-10
Payer: COMMERCIAL

## 2020-01-10 VITALS
DIASTOLIC BLOOD PRESSURE: 80 MMHG | SYSTOLIC BLOOD PRESSURE: 126 MMHG | HEART RATE: 71 BPM | TEMPERATURE: 98 F | BODY MASS INDEX: 33.19 KG/M2 | WEIGHT: 219 LBS | HEIGHT: 68 IN

## 2020-01-10 DIAGNOSIS — L98.9 SKIN LESION: Primary | ICD-10-CM

## 2020-01-10 PROCEDURE — 99213 OFFICE O/P EST LOW 20 MIN: CPT | Performed by: INTERNAL MEDICINE

## 2020-01-10 NOTE — PROGRESS NOTES
Damon Robles is a 37year old female. Patient presents with:  Urgent Care F/u: possible shingles on L-side of face/eyebrow    HPI:     41-year-old female with a history of migraine headaches here for evaluation of skin lesion on her left eyelid area.   She History:   Diagnosis Date   • Allergic rhinitis due to allergen 4/28/2015   • Menstrual migraine without status migrainosus, not intractable 2/15/2019   • Migraines    • Multiple allergies 2013    Allergies: spt + t,rw,w,m,c,d,cr,f 2013      Past Surgical Pulse 71   Temp 98.3 °F (36.8 °C) (Oral)   Ht 5' 8\" (1.727 m)   Wt 219 lb (99.3 kg)   LMP 12/29/2019 (Approximate)   Breastfeeding No   BMI 33.30 kg/m²     Physical Exam    Constitutional: She is oriented to person, place, and time.  She appears well-nour

## 2020-02-01 ENCOUNTER — OFFICE VISIT (OUTPATIENT)
Dept: OBGYN CLINIC | Facility: CLINIC | Age: 35
End: 2020-02-01
Payer: COMMERCIAL

## 2020-02-01 VITALS
WEIGHT: 209.19 LBS | DIASTOLIC BLOOD PRESSURE: 72 MMHG | BODY MASS INDEX: 32 KG/M2 | HEART RATE: 58 BPM | SYSTOLIC BLOOD PRESSURE: 109 MMHG

## 2020-02-01 DIAGNOSIS — Z01.419 ENCOUNTER FOR GYNECOLOGICAL EXAMINATION WITHOUT ABNORMAL FINDING: Primary | ICD-10-CM

## 2020-02-01 PROCEDURE — 99395 PREV VISIT EST AGE 18-39: CPT | Performed by: OBSTETRICS & GYNECOLOGY

## 2020-02-01 NOTE — PROGRESS NOTES
Marii Brennan is a 37year old female [facility] Patient's last menstrual period was 01/26/2020. Patient presents with:  Gyn Exam: ANNUAL EXAM -- HA better since Mirena IUD removal but still occurs. (+) aura w/ lights.  Periods lighter since Mirena IUD remova Minutes per session: Not on file      Stress: Not on file    Relationships      Social connections:        Talks on phone: Not on file        Gets together: Not on file        Attends Judaism service: Not on file        Active member of club or organizat 30 tablet, Rfl: 1  •  Levocetirizine Dihydrochloride (XYZAL) 5 MG Oral Tab, Take 1 tablet by mouth nightly., Disp: 30 tablet, Rfl: 11  •  Fluticasone Propionate (FLONASE) 50 MCG/ACT Nasal Suspension, 1 spray by Nasal route daily. , Disp: 1 Bottle, Rfl: 11 noted  Breast:   normal without palpable masses, tenderness, asymmetry, nipple discharge, nipple retraction or skin changes  Abdomen:   soft, nontender, nondistended, no masses  Skin/Hair:  no unusual rashes or bruises  Extremities:  no edema, no cyanosis

## 2020-02-03 LAB — HPV I/H RISK 1 DNA SPEC QL NAA+PROBE: NEGATIVE

## 2020-07-10 ENCOUNTER — PROCEDURE VISIT (OUTPATIENT)
Dept: NEUROLOGY | Facility: CLINIC | Age: 35
End: 2020-07-10
Payer: COMMERCIAL

## 2020-07-10 PROCEDURE — 95886 MUSC TEST DONE W/N TEST COMP: CPT | Performed by: PHYSICAL MEDICINE & REHABILITATION

## 2020-07-10 PROCEDURE — 95909 NRV CNDJ TST 5-6 STUDIES: CPT | Performed by: PHYSICAL MEDICINE & REHABILITATION

## 2020-07-10 NOTE — PROGRESS NOTES
[address]   Nerve Conduction Study and Electromyography Procedure Note    Patient: Brando Soares Hand Dominance:  RIGHT   Patient ID: [id number] Referring Dr:  Tequila Jim   Sex: Female Test Dr:  Alicia Lindsay   Date R. Flexor carpi radialis Median C6-C7 N None None None None Normal N N N N   R. Abductor pollicis brevis Median T6-Z8 N 2+ None None None Normal N N N N   R. First dorsal interosseous Ulnar C8-T1 N None None None None Normal N N N N   R.  Extensor indicis

## 2021-02-24 ENCOUNTER — OFFICE VISIT (OUTPATIENT)
Dept: NEUROLOGY | Facility: CLINIC | Age: 36
End: 2021-02-24
Payer: COMMERCIAL

## 2021-02-24 ENCOUNTER — TELEPHONE (OUTPATIENT)
Dept: NEUROLOGY | Facility: CLINIC | Age: 36
End: 2021-02-24

## 2021-02-24 VITALS
DIASTOLIC BLOOD PRESSURE: 70 MMHG | SYSTOLIC BLOOD PRESSURE: 122 MMHG | WEIGHT: 230 LBS | BODY MASS INDEX: 34.86 KG/M2 | HEIGHT: 68 IN

## 2021-02-24 DIAGNOSIS — G43.119 INTRACTABLE MIGRAINE WITH AURA WITHOUT STATUS MIGRAINOSUS: Primary | ICD-10-CM

## 2021-02-24 PROCEDURE — 99215 OFFICE O/P EST HI 40 MIN: CPT | Performed by: OTHER

## 2021-02-24 PROCEDURE — 3074F SYST BP LT 130 MM HG: CPT | Performed by: OTHER

## 2021-02-24 PROCEDURE — 3078F DIAST BP <80 MM HG: CPT | Performed by: OTHER

## 2021-02-24 PROCEDURE — 3008F BODY MASS INDEX DOCD: CPT | Performed by: OTHER

## 2021-02-24 RX ORDER — KETOROLAC TROMETHAMINE 10 MG/1
TABLET, FILM COATED ORAL
Qty: 30 TABLET | Refills: 3 | Status: SHIPPED | OUTPATIENT
Start: 2021-02-24 | End: 2021-11-29

## 2021-02-24 RX ORDER — METOCLOPRAMIDE 10 MG/1
TABLET ORAL
Qty: 30 TABLET | Refills: 1 | Status: SHIPPED | OUTPATIENT
Start: 2021-02-24 | End: 2021-11-29

## 2021-02-24 NOTE — PROGRESS NOTES
[facility]  [address]  [phone]        Neurology Clinic Follow Up Note    Chief Complaint:  Migraine (LOV 3/5/19 with Dr Snyder Au. C/o headache 3 times per month.  Will sometimes take benadryl schedule infusions of Depakote, magnesium, ketorolac, and reglan. This was to prevent HA but she states it did not. She was also taking sumatriptan for menstrual migraine prophylaxis. States it would \"help for a little bit. \"When she went from 46 to 6 a prophylactic and rescue agent for headaches. Works for [employer]/medicaid/medicare. ROS: Pertinent positive and negatives per HPI. All others were reviewed and negative.          Medications:  Current Outpatient Medications   Medication Instructio No rAPD. EOMI. No nystagmus. No ptosis. V1-V3 intact B/L to light touch. No pathological facial asymmetry. No flattening of the nasolabial fold. [place] Hearing grossly intact. Tongue midline. No atrophy or fasiculations of the tongue noted.  Palate and uvula el is nonfocal.  She already takes multiple medications/vitamins because of her sleeve gastrectomy and would like to minimize the number of pills she takes on a daily basis. We discussed Botox for migraine. Also discussed lifestyle modifications.   She will

## 2021-02-24 NOTE — PATIENT INSTRUCTIONS
Download a migraine diary at the [address] / MIGRAINE LIFESTYLE INFORMATION     Headache Preventive Treatment:   Please keep in mind that it takes 4-6 weeks for the medication to start working well and 2-3 months at the appropriate dose supplements to help prevent headache. Feverfew: Roni Edge is a common garden herb native to [facility] and popular in [facility]  as a treatment for disorders typically controlled by aspirin.   The mechanism of action is unknown but is believed to be relat for nausea and may aid in the absorption of other medications. HEADACHE DIET: Foods and beverages which may trigger migraine  Note that only 20% of headache patients are food sensitive.  You will know if you are food sensitive if you get a headache consi sleep  Weather changes  Light: Photophobia or light sesnitivity treatment involves a balance between desensitization and reduction in overly strong input. Use dark polarized glasses outside, but not inside.  Avoid bright or fluorescent light, but do not dim speech, etc.). 8. Headache/pain management therapies: Consider various complementary methods, including medication, behavioral therapy, psychological counselling, biofeedback, massage therapy, acupuncture, dry needling, and other modalities.   Such measu years.  Most types of headache such as migraine are electrical brain disorders (similar to how epilepsy is an electrical brain disorders).   Therefore, there is no testing that will reveal this \"dysfunctional electrical circuitry\" such on MRI, or other te mind that complete headache cure is not a realistic expectation. Our Team:  The nursing staff, and medical assistants are a major part of [place] [id number] and will be handling your phone calls and inquiries, if any.  Unless explicitly told otherwise a

## 2021-02-24 NOTE — TELEPHONE ENCOUNTER
[facility] for authorization of approval of Botox 200 u/vl cpt codes [id number], [id number]. Dx: U75.031. Talked to    Yasmin MERCEDES who enrolled Pt. This will be a one time fill. Pt. will have to call insurance after 04/01/21 for future Botox refill information.

## 2021-03-01 NOTE — TELEPHONE ENCOUNTER
Called [facility] to check on status for authorization of approval of Botox 200 u/vl cpt codes Grant Sorenson, [id number]. Dx: G43.80  T/t Barbra PRUITT. It was denied.  There is no documentation stating Pt. has tried prophylactic/preventive medications for 60 days or filemon

## 2021-03-01 NOTE — TELEPHONE ENCOUNTER
Called [facility] Prior Authorization. T/t Lidia LUKE  She will refax robyn of denial.

## 2021-03-04 NOTE — TELEPHONE ENCOUNTER
Received letter of denial. Discussed with Dr. Jud Marshall. Pt. informed of denial. Per Dr. Jud Marshall scheduled . for f/u on 03/09/21 at 7:45 am.

## 2021-03-09 ENCOUNTER — OFFICE VISIT (OUTPATIENT)
Dept: NEUROLOGY | Facility: CLINIC | Age: 36
End: 2021-03-09
Payer: COMMERCIAL

## 2021-03-09 VITALS
DIASTOLIC BLOOD PRESSURE: 72 MMHG | BODY MASS INDEX: 34.86 KG/M2 | HEIGHT: 68 IN | WEIGHT: 230 LBS | HEART RATE: 68 BPM | SYSTOLIC BLOOD PRESSURE: 118 MMHG

## 2021-03-09 DIAGNOSIS — IMO0002 CHRONIC MIGRAINE: Primary | ICD-10-CM

## 2021-03-09 PROCEDURE — 99213 OFFICE O/P EST LOW 20 MIN: CPT | Performed by: OTHER

## 2021-03-09 PROCEDURE — 3078F DIAST BP <80 MM HG: CPT | Performed by: OTHER

## 2021-03-09 PROCEDURE — 3008F BODY MASS INDEX DOCD: CPT | Performed by: OTHER

## 2021-03-09 PROCEDURE — 3074F SYST BP LT 130 MM HG: CPT | Performed by: OTHER

## 2021-03-09 NOTE — PROGRESS NOTES
Neurology Follow up Visit     Referred By: Dr. Head ref. provider found    Chief Complaint: Patient presents with:  Headache: LOV: 21 with Dr. Ashli Andrew. Patient was referred to Dr. Ron Nguyen for botox however botox was denied.  She states that she gets about 5 m use No       Drug use: No       Family History   Problem Relation Age of Onset   • Heart Surgery Maternal Grandmother         Bypass   • Glaucoma Maternal Grandmother    • Allergies Brother    • Hypertension Mother    • Lipids Mother    • Other (Other) Mot corneal reflexes intact  VII. Face symmetric, no facial weakness  VIII. Hearing intact to whisper. IX. Pallet elevates symmetrically. XI. Shoulder shrug is intact  XII.  Tongue is midline    Motor Exam:  Muscle tone normal  No atrophy or fasciculations  S

## 2021-03-18 NOTE — TELEPHONE ENCOUNTER
Patient called for status of approval of Botox as it is still pending; spoke with Filiberto Oscar and she will check today.

## 2021-03-29 NOTE — TELEPHONE ENCOUNTER
Pt called to check the status of  Her Botox PA-pt called insurance company to check and was told they don't have an authorization on file-please call pt

## 2021-03-30 NOTE — TELEPHONE ENCOUNTER
T/t [email or web address] Prior Authorization. States reason for denial was Pt. had to have tried and failed at least 2 meds from 3 different drug classes for at least 60 days. Informed she has.  Re initiated authorization request for Botox 200 u/vl cpt codes J

## 2021-04-01 NOTE — TELEPHONE ENCOUNTER
Called [employer]. Robyn WILSON verified we can do [employer] for Botox. Reference # [id number]. Called [facility] Prior Authorization. T/t Vergil Primrose States there are 2 authorizations on file and we can do [employer]. Reference # [id number]. L/m advising of above.

## 2021-04-01 NOTE — TELEPHONE ENCOUNTER
Received notice of 2 Approvals from [facility] for Botox [facility]# [id number] [place] valid 3/30/21-9/30/21 (BUY&BILL) and [employer] [id number] valid 3/30/21-9/30/21

## 2021-04-02 ENCOUNTER — OFFICE VISIT (OUTPATIENT)
Dept: NEUROLOGY | Facility: CLINIC | Age: 36
End: 2021-04-02
Payer: COMMERCIAL

## 2021-04-02 VITALS — BODY MASS INDEX: 34.86 KG/M2 | WEIGHT: 230 LBS | HEIGHT: 68 IN

## 2021-04-02 DIAGNOSIS — IMO0002 CHRONIC MIGRAINE: Primary | ICD-10-CM

## 2021-04-02 PROCEDURE — 64615 CHEMODENERV MUSC MIGRAINE: CPT | Performed by: OTHER

## 2021-04-02 PROCEDURE — 3008F BODY MASS INDEX DOCD: CPT | Performed by: OTHER

## 2021-04-02 NOTE — PROGRESS NOTES
Ambulatory to dept for treatment for intractable migraines. Pt states feeling well today, migraine symptoms mild. Pt denies any fevers or flu like symptoms.   Pt given Toradol and Cullen IVP then 2 liters of 0.9NS over 1hour as well as mag rider and qingacon Pt is functionally independent with no skilled PT needs.

## 2021-04-02 NOTE — PROCEDURES
PROCEDURE: Botox Injections (MIGRAINE PROTOCOL)   PRE-OPERATIVE DIAGNOSIS: Chronic Migraine  POST-OPERATIVE DIAGNOSIS: same as above   BLOOD LOSS: minimal COMPLICATIONS: none     DESCRIPTION OF PROCEDURE: After a discussion of the risks and benefits, the dionisio

## 2021-04-28 ENCOUNTER — TELEPHONE (OUTPATIENT)
Dept: NEUROLOGY | Facility: CLINIC | Age: 36
End: 2021-04-28

## 2021-04-28 NOTE — TELEPHONE ENCOUNTER
Botox PA# [facility] [id number] SA valid 3/30/21-9/30/21 Buy&Bill.   Due around 06/25/21.  Scheduled for Botox injections on 06/25/21 at 8 am.

## 2021-06-25 ENCOUNTER — OFFICE VISIT (OUTPATIENT)
Dept: NEUROLOGY | Facility: CLINIC | Age: 36
End: 2021-06-25
Payer: COMMERCIAL

## 2021-06-25 VITALS
HEART RATE: 76 BPM | WEIGHT: 230 LBS | BODY MASS INDEX: 34.86 KG/M2 | SYSTOLIC BLOOD PRESSURE: 120 MMHG | HEIGHT: 68 IN | DIASTOLIC BLOOD PRESSURE: 80 MMHG

## 2021-06-25 DIAGNOSIS — IMO0002 CHRONIC MIGRAINE: Primary | ICD-10-CM

## 2021-06-25 PROCEDURE — 64615 CHEMODENERV MUSC MIGRAINE: CPT | Performed by: OTHER

## 2021-06-25 PROCEDURE — 3008F BODY MASS INDEX DOCD: CPT | Performed by: OTHER

## 2021-06-25 PROCEDURE — 3074F SYST BP LT 130 MM HG: CPT | Performed by: OTHER

## 2021-06-25 PROCEDURE — 3079F DIAST BP 80-89 MM HG: CPT | Performed by: OTHER

## 2021-06-25 RX ORDER — ERENUMAB-AOOE 140 MG/ML
140 INJECTION, SOLUTION SUBCUTANEOUS
Qty: 1 EACH | Refills: 5 | Status: SHIPPED | OUTPATIENT
Start: 2021-06-25 | End: 2021-11-05

## 2021-07-08 ENCOUNTER — MED REC SCAN ONLY (OUTPATIENT)
Dept: NEUROLOGY | Facility: CLINIC | Age: 36
End: 2021-07-08

## 2021-07-23 ENCOUNTER — OFFICE VISIT (OUTPATIENT)
Dept: INTERNAL MEDICINE CLINIC | Facility: CLINIC | Age: 36
End: 2021-07-23
Payer: COMMERCIAL

## 2021-07-23 VITALS
HEIGHT: 68 IN | WEIGHT: 254 LBS | SYSTOLIC BLOOD PRESSURE: 130 MMHG | BODY MASS INDEX: 38.49 KG/M2 | DIASTOLIC BLOOD PRESSURE: 88 MMHG | RESPIRATION RATE: 16 BRPM | HEART RATE: 96 BPM | OXYGEN SATURATION: 99 %

## 2021-07-23 DIAGNOSIS — Z51.81 ENCOUNTER FOR THERAPEUTIC DRUG MONITORING: Primary | ICD-10-CM

## 2021-07-23 DIAGNOSIS — E66.9 CLASS 2 OBESITY WITH BODY MASS INDEX (BMI) OF 38.0 TO 38.9 IN ADULT, UNSPECIFIED OBESITY TYPE, UNSPECIFIED WHETHER SERIOUS COMORBIDITY PRESENT: ICD-10-CM

## 2021-07-23 DIAGNOSIS — Z98.84 S/P BARIATRIC SURGERY: ICD-10-CM

## 2021-07-23 DIAGNOSIS — F41.9 ANXIETY: ICD-10-CM

## 2021-07-23 DIAGNOSIS — F50.81 BINGE EATING DISORDER: ICD-10-CM

## 2021-07-23 DIAGNOSIS — E78.2 MIXED HYPERLIPIDEMIA: ICD-10-CM

## 2021-07-23 PROCEDURE — 3008F BODY MASS INDEX DOCD: CPT | Performed by: PHYSICIAN ASSISTANT

## 2021-07-23 PROCEDURE — 3079F DIAST BP 80-89 MM HG: CPT | Performed by: PHYSICIAN ASSISTANT

## 2021-07-23 PROCEDURE — 99204 OFFICE O/P NEW MOD 45 MIN: CPT | Performed by: PHYSICIAN ASSISTANT

## 2021-07-23 PROCEDURE — 3075F SYST BP GE 130 - 139MM HG: CPT | Performed by: PHYSICIAN ASSISTANT

## 2021-07-23 RX ORDER — SEMAGLUTIDE 0.5 MG/.5ML
0.5 INJECTION, SOLUTION SUBCUTANEOUS WEEKLY
Qty: 2 ML | Refills: 0 | Status: SHIPPED | OUTPATIENT
Start: 2021-07-23 | End: 2021-08-14

## 2021-07-23 NOTE — PATIENT INSTRUCTIONS
We are here to support you with weight loss, but please remember that you still need your primary care provider for your routine health maintenance.       PLAN:  Begin Wegovy 0.25mg weekly x 4 weeks, and then will increase to 0.5mg weekly   Go to the lab fo exercise towards calorie number per day)                   ** Daily INPUT> Look at nutrition section-- \"nutrients\" and it will break down your macros for the day (ie. Protein, carbs, fibers, sugars and fats). Try to stay within these numbers daily     2. email Jaycee Mina at [email or web address]. ipDatatel    Online Fitness  · Fitness  on PingMe in 10 DVD series   www. [employer] SafeRooms. ipDatatel  · Sit and Be Fit - Chair exercise series Www.sitandbefit. org  · Hip Hop Fit with Ferny Hilario at www.hiphopfit. net    Apps Not Treat Food Addiction by Ronald Nance Ph.D  · The Game [employer]- [employer] Documentary on plant based nutrition  · Fed Up - documentary about obesity (Free on [facility])  · The Truth About Sugar - documentary on sugar (Free on echoecho, https://youMedtrics Lab. be/9E9b

## 2021-07-23 NOTE — PROGRESS NOTES
HISTORY OF PRESENT ILLNESS  Patient presents with:  Weight Problem: pt at [facility] been looking for weight loss program, never tried any weight loss meds, open to trying meds possible revision      Poli Quaker is a 38year old female new to our disorders:negative    Depression/anxiety: + anxiety/depression  Glaucoma: negative   Kidney stones: negative   Eating disorder: negative   Migraines: Yes  Seizures: negative   Joint-related conditions: aches in knees  Liver disease: negative   Renal diseas 03/23/2019    MCH 26.5 03/23/2019    MCHC 31.6 03/23/2019    RDW 14.3 03/23/2019    .0 03/23/2019    MPV 8.3 07/29/2018     Lab Results   Component Value Date    GLU 82 03/23/2019    BUN 13 03/23/2019    BUNCREA 15.3 03/23/2019    CREATSERUM 0.85 03 facility-administered medications on file prior to visit.       ASSESSMENT  Initial Weight Data and Goal Weight Loss:       Diagnoses and all orders for this visit:    Encounter for therapeutic drug monitoring  -     CBC WITH DIFFERENTIAL WITH PLATELET  - Solution Auto-injector; Inject 0.5 mL (0.5 mg total) into the skin once a week for 4 doses.   -     ASSAY, THYROID STIM HORMONE  -     FREE T4 (FREE THYROXINE)  -     VITAMIN D, 25-HYDROXY    Binge eating disorder  -     CBC WITH DIFFERENTIAL WITH PLATELET discussed MOA, advised side effects and adverse effects of medication.   · S/P sleeve gastrectomy - will check labs  · Will consider Vyvanse  · Begin logging foods, went over macronutrients  · HLD - work on lifestyle modifications  · Medication use and side [place] balanced breaks (cheese and nuts)- without chocolate  5.  Reduce carbohydrates which includes sweets as well as rice, pasta, potatoes, bread, corn and instead choose whole grain options or more protein or vegetables (4-6 servings of vegetables per carbs.  Plum: One medium-sized (80 grams) contains 6 grams of carbs. VEGETABLES  Low carb vegetables        Patient Resources:    Personal Training/Fitness Classes/Health Coaching    · [address] @ [email or web address]. or Management/Behavior/Mindful Eating  · CALM meditation rei  · Headspace  · Am I Hungry? Mindful eating virtual  rei  · Www.yourweightmatters. org - Obesity Action Coalition sponsored Blog posts daily  · Motivation rei (black box with white \")- daily trevino

## 2021-07-25 RX ORDER — SEMAGLUTIDE 0.25 MG/.5ML
0.25 INJECTION, SOLUTION SUBCUTANEOUS WEEKLY
Qty: 2 ML | Refills: 0 | COMMUNITY
Start: 2021-07-25 | End: 2021-08-16

## 2021-08-01 LAB
% SATURATION: 19 % (CALC) (ref 16–45)
ABSOLUTE BASOPHILS: 10 CELLS/UL (ref 0–200)
ABSOLUTE EOSINOPHILS: 38 CELLS/UL (ref 15–500)
ABSOLUTE LYMPHOCYTES: 1464 CELLS/UL (ref 850–3900)
ABSOLUTE MONOCYTES: 422 CELLS/UL (ref 200–950)
ABSOLUTE NEUTROPHILS: 2866 CELLS/UL (ref 1500–7800)
ALBUMIN/GLOBULIN RATIO: 1.5 (CALC) (ref 1–2.5)
ALBUMIN: 4.1 G/DL (ref 3.6–5.1)
ALKALINE PHOSPHATASE: 70 U/L (ref 31–125)
ALT: 14 U/L (ref 6–29)
AST: 17 U/L (ref 10–30)
BASOPHILS: 0.2 %
BILIRUBIN, TOTAL: 0.6 MG/DL (ref 0.2–1.2)
BUN: 10 MG/DL (ref 7–25)
CALCIUM: 9.1 MG/DL (ref 8.6–10.2)
CARBON DIOXIDE: 30 MMOL/L (ref 20–32)
CHLORIDE: 104 MMOL/L (ref 98–110)
CHOL/HDLC RATIO: 4.8 (CALC)
CHOLESTEROL, TOTAL: 258 MG/DL
CREATININE: 0.78 MG/DL (ref 0.5–1.1)
EGFR IF AFRICN AM: 113 ML/MIN/1.73M2
EGFR IF NONAFRICN AM: 97 ML/MIN/1.73M2
EOSINOPHILS: 0.8 %
FERRITIN: 14 NG/ML (ref 16–154)
FOLATE, SERUM: 8.2 NG/ML
GLOBULIN: 2.8 G/DL (CALC) (ref 1.9–3.7)
GLUCOSE: 83 MG/DL (ref 65–99)
HDL CHOLESTEROL: 54 MG/DL
HEMATOCRIT: 33.4 % (ref 35–45)
HEMOGLOBIN A1C: 5.7 % OF TOTAL HGB
HEMOGLOBIN: 10.4 G/DL (ref 11.7–15.5)
IRON BINDING CAPACITY: 371 MCG/DL (CALC) (ref 250–450)
IRON, TOTAL: 70 MCG/DL (ref 40–190)
LDL-CHOLESTEROL: 180 MG/DL (CALC)
LYMPHOCYTES: 30.5 %
MCH: 23.9 PG (ref 27–33)
MCHC: 31.1 G/DL (ref 32–36)
MCV: 76.8 FL (ref 80–100)
MONOCYTES: 8.8 %
MPV: 11 FL (ref 7.5–12.5)
NEUTROPHILS: 59.7 %
NON-HDL CHOLESTEROL: 204 MG/DL (CALC)
PLATELET COUNT: 221 THOUSAND/UL (ref 140–400)
POTASSIUM: 4.2 MMOL/L (ref 3.5–5.3)
PROTEIN, TOTAL: 6.9 G/DL (ref 6.1–8.1)
RDW: 15.4 % (ref 11–15)
RED BLOOD CELL COUNT: 4.35 MILLION/UL (ref 3.8–5.1)
SODIUM: 139 MMOL/L (ref 135–146)
T4, FREE: 1 NG/DL (ref 0.8–1.8)
TRIGLYCERIDES: 109 MG/DL
TSH: 1.89 MIU/L
VITAMIN B1 (THIAMINE),$BLOOD: 69 NMOL/L (ref 78–185)
VITAMIN B12: 296 PG/ML (ref 200–1100)
VITAMIN D, 25-OH, TOTAL: 10 NG/ML (ref 30–100)
WHITE BLOOD CELL COUNT: 4.8 THOUSAND/UL (ref 3.8–10.8)

## 2021-08-09 NOTE — PROGRESS NOTES
+ anemia  Thiamine is low, monthly injections x 3 months  B12 is low, monthly injections x 6 months  Vitamin D is low, 50,000IU weekly x 6 months  Low ferritin, begin OTC ferrous sulfate 325mg daily  A1C prediabetic  Elevated cholesterol

## 2021-09-07 ENCOUNTER — TELEPHONE (OUTPATIENT)
Dept: NEUROLOGY | Facility: CLINIC | Age: 36
End: 2021-09-07

## 2021-09-07 NOTE — TELEPHONE ENCOUNTER
Botox PA# [facility] [id number] SA valid 3/30/21-9/30/21 Buy & Bill 200 units. Scheduled for Botox injections on 09/24/21 at 11:30 am.

## 2021-09-17 ENCOUNTER — OFFICE VISIT (OUTPATIENT)
Dept: INTERNAL MEDICINE CLINIC | Facility: CLINIC | Age: 36
End: 2021-09-17
Payer: COMMERCIAL

## 2021-09-17 VITALS
BODY MASS INDEX: 38.19 KG/M2 | RESPIRATION RATE: 16 BRPM | HEART RATE: 69 BPM | OXYGEN SATURATION: 100 % | HEIGHT: 68 IN | DIASTOLIC BLOOD PRESSURE: 84 MMHG | SYSTOLIC BLOOD PRESSURE: 128 MMHG | WEIGHT: 252 LBS

## 2021-09-17 DIAGNOSIS — E66.9 CLASS 2 OBESITY WITH BODY MASS INDEX (BMI) OF 38.0 TO 38.9 IN ADULT, UNSPECIFIED OBESITY TYPE, UNSPECIFIED WHETHER SERIOUS COMORBIDITY PRESENT: ICD-10-CM

## 2021-09-17 DIAGNOSIS — E53.8 B12 DEFICIENCY: ICD-10-CM

## 2021-09-17 DIAGNOSIS — F50.81 BINGE EATING DISORDER: ICD-10-CM

## 2021-09-17 DIAGNOSIS — Z51.81 ENCOUNTER FOR THERAPEUTIC DRUG MONITORING: Primary | ICD-10-CM

## 2021-09-17 DIAGNOSIS — E55.9 VITAMIN D DEFICIENCY: ICD-10-CM

## 2021-09-17 DIAGNOSIS — R73.03 PREDIABETES: ICD-10-CM

## 2021-09-17 DIAGNOSIS — E78.2 MIXED HYPERLIPIDEMIA: ICD-10-CM

## 2021-09-17 DIAGNOSIS — E51.9 THIAMINE DEFICIENCY: ICD-10-CM

## 2021-09-17 DIAGNOSIS — Z98.84 S/P BARIATRIC SURGERY: ICD-10-CM

## 2021-09-17 PROCEDURE — 3074F SYST BP LT 130 MM HG: CPT | Performed by: PHYSICIAN ASSISTANT

## 2021-09-17 PROCEDURE — 99214 OFFICE O/P EST MOD 30 MIN: CPT | Performed by: PHYSICIAN ASSISTANT

## 2021-09-17 PROCEDURE — 96372 THER/PROPH/DIAG INJ SC/IM: CPT | Performed by: PHYSICIAN ASSISTANT

## 2021-09-17 PROCEDURE — 3079F DIAST BP 80-89 MM HG: CPT | Performed by: PHYSICIAN ASSISTANT

## 2021-09-17 PROCEDURE — 3008F BODY MASS INDEX DOCD: CPT | Performed by: PHYSICIAN ASSISTANT

## 2021-09-17 RX ORDER — THIAMINE HYDROCHLORIDE 100 MG/ML
100 INJECTION, SOLUTION INTRAMUSCULAR; INTRAVENOUS ONCE
Status: COMPLETED | OUTPATIENT
Start: 2021-09-17 | End: 2021-09-17

## 2021-09-17 RX ORDER — TRETINOIN 0.025 %
CREAM (GRAM) TOPICAL
COMMUNITY
Start: 2021-08-19 | End: 2022-01-28

## 2021-09-17 RX ORDER — CYANOCOBALAMIN 1000 UG/ML
1000 INJECTION INTRAMUSCULAR; SUBCUTANEOUS ONCE
Status: COMPLETED | OUTPATIENT
Start: 2021-09-17 | End: 2021-09-17

## 2021-09-17 RX ADMIN — THIAMINE HYDROCHLORIDE 100 MG: 100 INJECTION, SOLUTION INTRAMUSCULAR; INTRAVENOUS at 14:11:00

## 2021-09-17 RX ADMIN — CYANOCOBALAMIN 1000 MCG: 1000 INJECTION INTRAMUSCULAR; SUBCUTANEOUS at 14:10:00

## 2021-09-17 NOTE — PROGRESS NOTES
HISTORY OF PRESENT ILLNESS  Patient presents with:  Weight Check: down 2      Juan Rice is a 38year old female here for follow up in medical weight loss program.   Down 2lbs  Compliant on just took the 0.5mg [facility] yesterday  Denies chest pain, short Date    WBC 4.8 07/28/2021    RBC 4.35 07/28/2021    HGB 10.4 (L) 07/28/2021    HCT 33.4 (L) 07/28/2021    MCV 76.8 (L) 07/28/2021    MCH 23.9 (L) 07/28/2021    MCHC 31.1 (L) 07/28/2021    RDW 15.4 (H) 07/28/2021     07/28/2021    MPV 8.3 07/29/2018 for nausea associated with migraine, no more than 2 tablets in 24 hours, Disp: 30 tablet, Rfl: 1  diphenhydrAMINE HCl 25 MG Oral Tab, Take 25 mg by mouth nightly as needed for Itching.  , Disp: , Rfl:   Levocetirizine Dihydrochloride (XYZAL) 5 MG Oral Tab, recommended. · Discussed the role of sleep and stress in weight management. · Counseled on comprehensive weight loss plan including attention to nutrition, exercise and behavior/stress management for success.  See patient instruction below for more detail

## 2021-09-22 ENCOUNTER — HOSPITAL ENCOUNTER (OUTPATIENT)
Age: 36
Discharge: HOME | End: 2021-09-22
Payer: COMMERCIAL

## 2021-09-22 VITALS
HEART RATE: 81 BPM | OXYGEN SATURATION: 99 % | WEIGHT: 250 LBS | BODY MASS INDEX: 37.89 KG/M2 | HEIGHT: 68 IN | RESPIRATION RATE: 16 BRPM | TEMPERATURE: 97 F | DIASTOLIC BLOOD PRESSURE: 88 MMHG | SYSTOLIC BLOOD PRESSURE: 138 MMHG

## 2021-09-22 DIAGNOSIS — R20.2 PARESTHESIAS: Primary | ICD-10-CM

## 2021-09-22 PROCEDURE — 99213 OFFICE O/P EST LOW 20 MIN: CPT | Performed by: NURSE PRACTITIONER

## 2021-09-22 RX ORDER — PREDNISONE 20 MG/1
40 TABLET ORAL DAILY
Qty: 10 TABLET | Refills: 0 | Status: SHIPPED | OUTPATIENT
Start: 2021-09-22 | End: 2021-09-27

## 2021-09-22 NOTE — ED INITIAL ASSESSMENT (HPI)
x3 days Pt c/o numbness/tingling in her left 5th toe, \"pins and needles\" radiates into Pt's left lateral ankle.     Denies any new/preivuos injury

## 2021-09-22 NOTE — ED PROVIDER NOTES
Patient Seen in: [facility]      History   Patient presents with:  Numbness Weakness  Tingling    Stated Complaint: numbess in toe, tingling in foot    Subjective:   24-year-old female presents today with complaints of tingling to the lateral 1638]   /88   Pulse 81   Resp 16   Temp 97.3 °F (36.3 °C)   Temp src Temporal   SpO2 99 %   O2 Device None (Room air)       Current:/88   Pulse 81   Temp 97.3 °F (36.3 °C) (Temporal)   Resp 16   Ht 172.7 cm (5' 8\")   Wt 113.4 kg   LMP 09/13/20

## 2021-09-24 ENCOUNTER — OFFICE VISIT (OUTPATIENT)
Dept: NEUROLOGY | Facility: CLINIC | Age: 36
End: 2021-09-24
Payer: COMMERCIAL

## 2021-09-24 VITALS
HEIGHT: 68 IN | SYSTOLIC BLOOD PRESSURE: 120 MMHG | DIASTOLIC BLOOD PRESSURE: 80 MMHG | BODY MASS INDEX: 37.89 KG/M2 | HEART RATE: 70 BPM | WEIGHT: 250 LBS

## 2021-09-24 DIAGNOSIS — IMO0002 CHRONIC MIGRAINE: Primary | ICD-10-CM

## 2021-09-24 PROCEDURE — 3074F SYST BP LT 130 MM HG: CPT | Performed by: OTHER

## 2021-09-24 PROCEDURE — 64615 CHEMODENERV MUSC MIGRAINE: CPT | Performed by: OTHER

## 2021-09-24 PROCEDURE — 3008F BODY MASS INDEX DOCD: CPT | Performed by: OTHER

## 2021-09-24 PROCEDURE — 3079F DIAST BP 80-89 MM HG: CPT | Performed by: OTHER

## 2021-11-05 ENCOUNTER — OFFICE VISIT (OUTPATIENT)
Dept: INTERNAL MEDICINE CLINIC | Facility: CLINIC | Age: 36
End: 2021-11-05
Payer: COMMERCIAL

## 2021-11-05 VITALS
HEIGHT: 68 IN | WEIGHT: 245 LBS | HEART RATE: 79 BPM | OXYGEN SATURATION: 99 % | RESPIRATION RATE: 16 BRPM | SYSTOLIC BLOOD PRESSURE: 122 MMHG | BODY MASS INDEX: 37.13 KG/M2 | DIASTOLIC BLOOD PRESSURE: 70 MMHG

## 2021-11-05 DIAGNOSIS — Z51.81 ENCOUNTER FOR THERAPEUTIC DRUG MONITORING: Primary | ICD-10-CM

## 2021-11-05 DIAGNOSIS — Z98.84 S/P BARIATRIC SURGERY: ICD-10-CM

## 2021-11-05 DIAGNOSIS — E53.8 B12 DEFICIENCY: ICD-10-CM

## 2021-11-05 DIAGNOSIS — E66.9 CLASS 2 OBESITY WITH BODY MASS INDEX (BMI) OF 38.0 TO 38.9 IN ADULT, UNSPECIFIED OBESITY TYPE, UNSPECIFIED WHETHER SERIOUS COMORBIDITY PRESENT: ICD-10-CM

## 2021-11-05 DIAGNOSIS — E78.2 MIXED HYPERLIPIDEMIA: ICD-10-CM

## 2021-11-05 DIAGNOSIS — R73.03 PREDIABETES: ICD-10-CM

## 2021-11-05 DIAGNOSIS — E51.9 THIAMINE DEFICIENCY: ICD-10-CM

## 2021-11-05 PROCEDURE — 96372 THER/PROPH/DIAG INJ SC/IM: CPT | Performed by: PHYSICIAN ASSISTANT

## 2021-11-05 PROCEDURE — 3078F DIAST BP <80 MM HG: CPT | Performed by: PHYSICIAN ASSISTANT

## 2021-11-05 PROCEDURE — 99214 OFFICE O/P EST MOD 30 MIN: CPT | Performed by: PHYSICIAN ASSISTANT

## 2021-11-05 PROCEDURE — 3008F BODY MASS INDEX DOCD: CPT | Performed by: PHYSICIAN ASSISTANT

## 2021-11-05 PROCEDURE — 3074F SYST BP LT 130 MM HG: CPT | Performed by: PHYSICIAN ASSISTANT

## 2021-11-05 RX ORDER — CYANOCOBALAMIN 1000 UG/ML
1000 INJECTION INTRAMUSCULAR; SUBCUTANEOUS ONCE
Status: COMPLETED | OUTPATIENT
Start: 2021-11-05 | End: 2021-11-05

## 2021-11-05 RX ORDER — THIAMINE HYDROCHLORIDE 100 MG/ML
100 INJECTION, SOLUTION INTRAMUSCULAR; INTRAVENOUS ONCE
Status: COMPLETED | OUTPATIENT
Start: 2021-11-05 | End: 2021-11-05

## 2021-11-05 RX ADMIN — THIAMINE HYDROCHLORIDE 100 MG: 100 INJECTION, SOLUTION INTRAMUSCULAR; INTRAVENOUS at 14:42:00

## 2021-11-05 RX ADMIN — CYANOCOBALAMIN 1000 MCG: 1000 INJECTION INTRAMUSCULAR; SUBCUTANEOUS at 14:24:00

## 2021-11-05 NOTE — PROGRESS NOTES
HISTORY OF PRESENT ILLNESS  Patient presents with:  Weight Check: down [place] [address] is a 43year old female here for follow up in medical weight loss program.   Down 7lbs  Compliant on ozempic  Denies chest pain, shortness of breath, dizziness, bl cyanosis, no clubbing, no edema    Lab Results   Component Value Date    WBC 4.8 07/28/2021    RBC 4.35 07/28/2021    HGB 10.4 (L) 07/28/2021    HCT 33.4 (L) 07/28/2021    MCV 76.8 (L) 07/28/2021    MCH 23.9 (L) 07/28/2021    MCHC 31.1 (L) 07/28/2021    RD more than 2 tablets in 24 hours, Disp: 30 tablet, Rfl: 1  diphenhydrAMINE HCl 25 MG Oral Tab, Take 25 mg by mouth nightly as needed for Itching.  , Disp: , Rfl:   Levocetirizine Dihydrochloride (XYZAL) 5 MG Oral Tab, Take 1 tablet by mouth nightly., Disp: and behavior/stress management for success. See patient instruction below for more details.   · Discussed strategies to overcome barriers to successful weight loss and weight maintenance  · FITTE: ACSM recommendations (150-300 minutes/ week in active weight

## 2021-11-13 DIAGNOSIS — G43.119 INTRACTABLE MIGRAINE WITH AURA WITHOUT STATUS MIGRAINOSUS: ICD-10-CM

## 2021-11-15 RX ORDER — KETOROLAC TROMETHAMINE 10 MG/1
TABLET, FILM COATED ORAL
Qty: 30 TABLET | Refills: 0 | Status: CANCELLED | OUTPATIENT
Start: 2021-11-15

## 2021-11-15 RX ORDER — METOCLOPRAMIDE 10 MG/1
TABLET ORAL
Qty: 30 TABLET | Refills: 0 | Status: CANCELLED | OUTPATIENT
Start: 2021-11-15

## 2021-11-15 NOTE — TELEPHONE ENCOUNTER
Refill request for ketorolac 10 mg, take 1 tab as needed for migraine, #30, no refills    Refill request for metoclopramide 10 mg,t krysta 1/2-1 tab as needed for migraine, #30, no refills    LOV: 9/24/21  NOV: none

## 2021-11-19 ENCOUNTER — PROCEDURE VISIT (OUTPATIENT)
Dept: NEUROLOGY | Facility: CLINIC | Age: 36
End: 2021-11-19
Payer: COMMERCIAL

## 2021-11-19 ENCOUNTER — TELEPHONE (OUTPATIENT)
Dept: NEUROLOGY | Facility: CLINIC | Age: 36
End: 2021-11-19

## 2021-11-19 DIAGNOSIS — R20.8 NUMBNESS OF LEFT FOOT: Primary | ICD-10-CM

## 2021-11-19 PROCEDURE — 95908 NRV CNDJ TST 3-4 STUDIES: CPT | Performed by: OTHER

## 2021-11-19 PROCEDURE — 95886 MUSC TEST DONE W/N TEST COMP: CPT | Performed by: OTHER

## 2021-11-19 NOTE — TELEPHONE ENCOUNTER
Reji Ba, MD  3 [facility] Nurse  Please fax my EMG report along with raw data /chart to referring MD, thanks. MELISA       Faxed EMG results to Dr. Naresh Brumfield office. Confirmation received.

## 2021-11-19 NOTE — PROCEDURES
Date of service: 11/19/2021    Procedure: Nerve Conduction Study and Electromyography - complete EMG study in left lower extremity. History: Electrodiagnostic testing on this 43year old female was performed in left lower extremity.  The patient is compl

## 2021-11-29 ENCOUNTER — TELEPHONE (OUTPATIENT)
Dept: NEUROLOGY | Facility: CLINIC | Age: 36
End: 2021-11-29

## 2021-11-29 ENCOUNTER — OFFICE VISIT (OUTPATIENT)
Dept: NEUROLOGY | Facility: CLINIC | Age: 36
End: 2021-11-29
Payer: COMMERCIAL

## 2021-11-29 VITALS
BODY MASS INDEX: 36.37 KG/M2 | HEIGHT: 68 IN | DIASTOLIC BLOOD PRESSURE: 86 MMHG | WEIGHT: 240 LBS | HEART RATE: 70 BPM | SYSTOLIC BLOOD PRESSURE: 120 MMHG

## 2021-11-29 DIAGNOSIS — G43.119 INTRACTABLE MIGRAINE WITH AURA WITHOUT STATUS MIGRAINOSUS: ICD-10-CM

## 2021-11-29 PROCEDURE — 3008F BODY MASS INDEX DOCD: CPT | Performed by: OTHER

## 2021-11-29 PROCEDURE — 3074F SYST BP LT 130 MM HG: CPT | Performed by: OTHER

## 2021-11-29 PROCEDURE — 3079F DIAST BP 80-89 MM HG: CPT | Performed by: OTHER

## 2021-11-29 PROCEDURE — 99214 OFFICE O/P EST MOD 30 MIN: CPT | Performed by: OTHER

## 2021-11-29 RX ORDER — KETOROLAC TROMETHAMINE 10 MG/1
TABLET, FILM COATED ORAL
Qty: 30 TABLET | Refills: 6 | Status: SHIPPED | OUTPATIENT
Start: 2021-11-29

## 2021-11-29 RX ORDER — METOCLOPRAMIDE 10 MG/1
TABLET ORAL
Qty: 30 TABLET | Refills: 6 | Status: SHIPPED
Start: 2021-11-29

## 2021-11-29 RX ORDER — UBROGEPANT 100 MG/1
TABLET ORAL
Qty: 10 TABLET | Refills: 6 | Status: SHIPPED | OUTPATIENT
Start: 2021-11-29 | End: 2021-12-29

## 2021-11-29 NOTE — TELEPHONE ENCOUNTER
Called [facility] Prior Authorization   for authorization of approval of Botox 200 u/vl cpt codes [id number], [id number]. Dx: Q59.070.  Ren Campos     initiated request. PA# [id number] pending approval. Gisselle Brush

## 2021-11-29 NOTE — PATIENT INSTRUCTIONS
1. To prevent headaches please take the following medications daily: Continue to get your Botox injections. 2. When you feel the headache coming on, take: [facility] (if insurance approves it). If not you can use Reglan, benadryl, and Toradol.  Please be chao

## 2021-11-29 NOTE — PROGRESS NOTES
[facility]  [address] [facility], [address] [place] [address]  [phone]        Neurology Clinic Follow Up Note    Chief Complaint:  Migraine (LOV: 2/24/21.  Pt states she has been going to get botox injections with Dr. Criss kinney reglan. This was to prevent HA but she states it did not. She was also taking sumatriptan for menstrual migraine prophylaxis. States it would \"help for a little bit. \"When she went from 50 to 100mg of sumatriptan she said \"it was worse than the HA. \" migraine sx are not as severe now that she is on botox. Denies any sx of melena or bloody stools. Sleeps about 6 hours a night; goes to bed at 10/11 PM but does not fall asleep till midnight. Admits to anxiety/work stress. Changing jobs next mo. ergocalciferol 1.25 MG (68983 UT) Oral Cap, Take 1 capsule (50,000 Units total) by mouth once a week.  With food for 6 months total, Disp: 24 capsule, Rfl: 0  •  Ketorolac Tromethamine 10 MG Oral Tab, 1 tablet as needed for headache, no more than 3 tablets hemorrhages, exudates, or papilledema. No attenuation. No pallor.  - Motor:  normal tone/bulk. No interosseous wasting. No flattening of hypothenar eminences. Motor Strength    Pronator drift: No pronator drift . Arm Rolling: No orbiting.    Finger Taps: sleep and weight loss. She will keep a migraine diary to see if she actually meets the diagnostic criteria for menstrual migraine. Plan   1. Migraine with aura and tension type headaches  Diagnostics:  1.  Migraine diary including evaluation of p

## 2021-11-30 NOTE — TELEPHONE ENCOUNTER
Received fax from Perry Lizama of approval for Botox 200 unitsPA# [id number] [facility] [id number]  effective 11/29/21-11/29/22. [address] [facility].  Scheduled for next series of Botox injections on 12/22/21 at 8:45 am.

## 2021-12-17 ENCOUNTER — OFFICE VISIT (OUTPATIENT)
Dept: INTERNAL MEDICINE CLINIC | Facility: CLINIC | Age: 36
End: 2021-12-17
Payer: COMMERCIAL

## 2021-12-17 VITALS
RESPIRATION RATE: 16 BRPM | DIASTOLIC BLOOD PRESSURE: 70 MMHG | BODY MASS INDEX: 35.46 KG/M2 | SYSTOLIC BLOOD PRESSURE: 120 MMHG | HEIGHT: 68 IN | HEART RATE: 74 BPM | WEIGHT: 234 LBS

## 2021-12-17 DIAGNOSIS — E66.01 CLASS 2 SEVERE OBESITY WITH SERIOUS COMORBIDITY AND BODY MASS INDEX (BMI) OF 35.0 TO 35.9 IN ADULT, UNSPECIFIED OBESITY TYPE (HCC): ICD-10-CM

## 2021-12-17 DIAGNOSIS — E53.8 B12 DEFICIENCY: ICD-10-CM

## 2021-12-17 DIAGNOSIS — Z98.84 S/P BARIATRIC SURGERY: ICD-10-CM

## 2021-12-17 DIAGNOSIS — E51.9 THIAMINE DEFICIENCY: ICD-10-CM

## 2021-12-17 DIAGNOSIS — E78.2 MIXED HYPERLIPIDEMIA: ICD-10-CM

## 2021-12-17 DIAGNOSIS — R73.03 PREDIABETES: ICD-10-CM

## 2021-12-17 DIAGNOSIS — Z51.81 ENCOUNTER FOR THERAPEUTIC DRUG MONITORING: Primary | ICD-10-CM

## 2021-12-17 DIAGNOSIS — F50.81 BINGE EATING DISORDER: ICD-10-CM

## 2021-12-17 PROCEDURE — 96372 THER/PROPH/DIAG INJ SC/IM: CPT | Performed by: PHYSICIAN ASSISTANT

## 2021-12-17 PROCEDURE — 3078F DIAST BP <80 MM HG: CPT | Performed by: PHYSICIAN ASSISTANT

## 2021-12-17 PROCEDURE — 3074F SYST BP LT 130 MM HG: CPT | Performed by: PHYSICIAN ASSISTANT

## 2021-12-17 PROCEDURE — 99214 OFFICE O/P EST MOD 30 MIN: CPT | Performed by: PHYSICIAN ASSISTANT

## 2021-12-17 PROCEDURE — 3008F BODY MASS INDEX DOCD: CPT | Performed by: PHYSICIAN ASSISTANT

## 2021-12-17 RX ORDER — CYANOCOBALAMIN 1000 UG/ML
1000 INJECTION INTRAMUSCULAR; SUBCUTANEOUS ONCE
Status: COMPLETED | OUTPATIENT
Start: 2021-12-17 | End: 2021-12-17

## 2021-12-17 RX ADMIN — CYANOCOBALAMIN 1000 MCG: 1000 INJECTION INTRAMUSCULAR; SUBCUTANEOUS at 08:38:00

## 2021-12-17 NOTE — PROGRESS NOTES
HISTORY OF PRESENT ILLNESS  Patient presents with:  Weight Check: down 11 lbs   Immunization/Injection: b12 #3      Cas Rodriguez is a 43year old female here for follow up in medical weight loss program.   Down 11lbs  Compliant on ozempic  Denies jason edema    Lab Results   Component Value Date    WBC 4.8 07/28/2021    RBC 4.35 07/28/2021    HGB 10.4 (L) 07/28/2021    HCT 33.4 (L) 07/28/2021    MCV 76.8 (L) 07/28/2021    MCH 23.9 (L) 07/28/2021    MCHC 31.1 (L) 07/28/2021    RDW 15.4 (H) 07/28/2021    P Subcutaneous Solution Pen-injector, Inject 1 mg into the skin once a week., Disp: 9 mL, Rfl: 0  tretinoin 0.025 % External Cream, , Disp: , Rfl:   ergocalciferol 1.25 MG (33308 UT) Oral Cap, Take 1 capsule (50,000 Units total) by mouth once a week.  With fo side effects reviewed with patient. Medication contraindications: EKG prior to stimulant  · Follow up with dietitian and psychologist as recommended. · Discussed the role of sleep and stress in weight management.   · Counseled on comprehensive weight loss

## 2021-12-22 ENCOUNTER — OFFICE VISIT (OUTPATIENT)
Dept: NEUROLOGY | Facility: CLINIC | Age: 36
End: 2021-12-22
Payer: COMMERCIAL

## 2021-12-22 DIAGNOSIS — R20.8 NUMBNESS OF LEFT FOOT: ICD-10-CM

## 2021-12-22 DIAGNOSIS — G43.119 INTRACTABLE MIGRAINE WITH AURA WITHOUT STATUS MIGRAINOSUS: Primary | ICD-10-CM

## 2021-12-22 PROCEDURE — 64615 CHEMODENERV MUSC MIGRAINE: CPT | Performed by: OTHER

## 2022-01-26 RX ORDER — ERGOCALCIFEROL 1.25 MG/1
50000 CAPSULE ORAL WEEKLY
Qty: 12 CAPSULE | Refills: 1 | OUTPATIENT
Start: 2022-01-26

## 2022-01-26 NOTE — TELEPHONE ENCOUNTER
Requesting   Requested Prescriptions     Pending Prescriptions Disp Refills   • ERGOCALCIFEROL 1.25 MG (92003 UT) Oral Cap [Pharmacy Med Name: VITAMIN D2 1.25MG(50,000 UNIT)] 12 capsule 1     Sig: TAKE 1 CAPSULE (50,000 UNITS TOTAL) BY MOUTH ONCE A WEEK.  Stacy Abebe

## 2022-01-28 ENCOUNTER — OFFICE VISIT (OUTPATIENT)
Dept: INTERNAL MEDICINE CLINIC | Facility: CLINIC | Age: 37
End: 2022-01-28
Payer: COMMERCIAL

## 2022-01-28 VITALS
BODY MASS INDEX: 34.4 KG/M2 | RESPIRATION RATE: 16 BRPM | SYSTOLIC BLOOD PRESSURE: 118 MMHG | WEIGHT: 227 LBS | HEART RATE: 89 BPM | HEIGHT: 68 IN | DIASTOLIC BLOOD PRESSURE: 78 MMHG | OXYGEN SATURATION: 98 %

## 2022-01-28 DIAGNOSIS — E78.2 MIXED HYPERLIPIDEMIA: ICD-10-CM

## 2022-01-28 DIAGNOSIS — Z51.81 ENCOUNTER FOR THERAPEUTIC DRUG MONITORING: Primary | ICD-10-CM

## 2022-01-28 DIAGNOSIS — E51.9 THIAMINE DEFICIENCY: ICD-10-CM

## 2022-01-28 DIAGNOSIS — Z98.84 S/P BARIATRIC SURGERY: ICD-10-CM

## 2022-01-28 DIAGNOSIS — R73.03 PREDIABETES: ICD-10-CM

## 2022-01-28 DIAGNOSIS — E53.8 VITAMIN B12 DEFICIENCY: ICD-10-CM

## 2022-01-28 DIAGNOSIS — F50.81 BINGE EATING DISORDER: ICD-10-CM

## 2022-01-28 DIAGNOSIS — E66.9 CLASS 1 OBESITY WITH BODY MASS INDEX (BMI) OF 34.0 TO 34.9 IN ADULT, UNSPECIFIED OBESITY TYPE, UNSPECIFIED WHETHER SERIOUS COMORBIDITY PRESENT: ICD-10-CM

## 2022-01-28 PROCEDURE — 96372 THER/PROPH/DIAG INJ SC/IM: CPT | Performed by: PHYSICIAN ASSISTANT

## 2022-01-28 PROCEDURE — 3074F SYST BP LT 130 MM HG: CPT | Performed by: PHYSICIAN ASSISTANT

## 2022-01-28 PROCEDURE — 99214 OFFICE O/P EST MOD 30 MIN: CPT | Performed by: PHYSICIAN ASSISTANT

## 2022-01-28 PROCEDURE — 3008F BODY MASS INDEX DOCD: CPT | Performed by: PHYSICIAN ASSISTANT

## 2022-01-28 PROCEDURE — 3078F DIAST BP <80 MM HG: CPT | Performed by: PHYSICIAN ASSISTANT

## 2022-01-28 RX ORDER — THIAMINE HYDROCHLORIDE 100 MG/ML
100 INJECTION, SOLUTION INTRAMUSCULAR; INTRAVENOUS ONCE
Status: COMPLETED | OUTPATIENT
Start: 2022-01-28 | End: 2022-01-28

## 2022-01-28 RX ORDER — CYANOCOBALAMIN 1000 UG/ML
1000 INJECTION INTRAMUSCULAR; SUBCUTANEOUS ONCE
Status: COMPLETED | OUTPATIENT
Start: 2022-01-28 | End: 2022-01-28

## 2022-01-28 RX ORDER — UBROGEPANT 100 MG/1
TABLET ORAL
COMMUNITY
Start: 2022-01-12

## 2022-01-28 RX ORDER — IBUPROFEN 600 MG/1
1 TABLET ORAL EVERY 6 HOURS PRN
COMMUNITY
Start: 2021-12-30

## 2022-01-28 RX ADMIN — CYANOCOBALAMIN 1000 MCG: 1000 INJECTION INTRAMUSCULAR; SUBCUTANEOUS at 12:57:00

## 2022-01-28 RX ADMIN — THIAMINE HYDROCHLORIDE 100 MG: 100 INJECTION, SOLUTION INTRAMUSCULAR; INTRAVENOUS at 12:58:00

## 2022-01-28 NOTE — PROGRESS NOTES
HISTORY OF PRESENT ILLNESS  Patient presents with:  Weight Check: down [facility] is a 43year old female here for follow up in medical weight loss program.   Down 7lbs  Compliant on ozempic  Denies chest pain, shortness of breath, dizziness 15. 4 (H) 07/28/2021     07/28/2021    MPV 8.3 07/29/2018     Lab Results   Component Value Date    GLU 83 07/28/2021    BUN 10 07/28/2021    BUNCREA NOT APPLICABLE 43/75/4981    CREATSERUM 0.78 07/28/2021    ANIONGAP 5 03/23/2019    GFRNAA 97 07/28/ Itching.  , Disp: , Rfl:   Levocetirizine Dihydrochloride (XYZAL) 5 MG Oral Tab, Take 1 tablet by mouth nightly., Disp: 30 tablet, Rfl: 11  Fluticasone Propionate (FLONASE) 50 MCG/ACT Nasal Suspension, 1 spray by Nasal route daily. , Disp: 1 Bottle, Rfl: 11 below for more details.   · Discussed strategies to overcome barriers to successful weight loss and weight maintenance  · FITTE: ACSM recommendations (150-300 minutes/ week in active weight loss)   · Weight Loss consent to treat reviewed and signed     Santos Aguilar

## 2022-02-08 ENCOUNTER — TELEPHONE (OUTPATIENT)
Dept: INTERNAL MEDICINE CLINIC | Facility: CLINIC | Age: 37
End: 2022-02-08

## 2022-02-08 NOTE — TELEPHONE ENCOUNTER
Pt called left lisa to schedule clinic cx appt for march     Pt would like to know before she r/s her appt if she needs labs done first

## 2022-02-17 ENCOUNTER — TELEPHONE (OUTPATIENT)
Dept: NEUROLOGY | Facility: CLINIC | Age: 37
End: 2022-02-17

## 2022-02-17 NOTE — TELEPHONE ENCOUNTER
CHINA Schmitt [facility] [id number] SA effective 11/29/21-11/29/22 Buy & Bill 200 units  Due 03/18/22.  Scheduled for next series of Botox injections on 03/18/22 at 8 am.

## 2022-02-20 NOTE — TELEPHONE ENCOUNTER
Requesting Ozempic 1 mg  LOV: 1/28/22  RTC: not noted  Last Relevant Labs: 7/28/21  Filled: 11/5/21 #9ml with 0 refills    3/25/2022  9:20 AM Rachel Duenas PA-C [facility]

## 2022-02-26 ENCOUNTER — LAB ENCOUNTER (OUTPATIENT)
Dept: LAB | Facility: HOSPITAL | Age: 37
End: 2022-02-26
Attending: PHYSICIAN ASSISTANT
Payer: COMMERCIAL

## 2022-02-26 DIAGNOSIS — E51.9 THIAMIN DEFICIENCY: Primary | ICD-10-CM

## 2022-02-26 PROCEDURE — 36415 COLL VENOUS BLD VENIPUNCTURE: CPT

## 2022-02-26 PROCEDURE — 84425 ASSAY OF VITAMIN B-1: CPT

## 2022-03-03 LAB — VITAMIN B1 (THIAMINE), WHOLE B: 66 NMOL/L

## 2022-03-14 NOTE — LETTER
AUTHORIZATION FOR SURGICAL OPERATION OR OTHER PROCEDURE    1.  I hereby authorize Dr. Artur Ramos and the [facility] staff assigned to my case to perform the following operation and/or procedure at the [facility]:    _________________________________________B _____________________Tamika J Brock_____________________  (please print)       Patient signature:  ___________________________________________________             Relationship to Patient:           []  Parent    Responsible person 08-Nov-2021

## 2022-03-18 ENCOUNTER — OFFICE VISIT (OUTPATIENT)
Dept: NEUROLOGY | Facility: CLINIC | Age: 37
End: 2022-03-18
Payer: COMMERCIAL

## 2022-03-18 DIAGNOSIS — G43.711 CHRONIC MIGRAINE WITHOUT AURA, INTRACTABLE, WITH STATUS MIGRAINOSUS: Primary | ICD-10-CM

## 2022-03-18 PROCEDURE — 64615 CHEMODENERV MUSC MIGRAINE: CPT | Performed by: OTHER

## 2022-03-18 NOTE — PROCEDURES
PROCEDURE: Botox Injections (MIGRAINE PROTOCOL)   PRE-OPERATIVE DIAGNOSIS: Chronic Migraine  POST-OPERATIVE DIAGNOSIS: same as above   BLOOD LOSS: minimal COMPLICATIONS: none     DESCRIPTION OF PROCEDURE: After a discussion of the risks and benefits, the patient consented to the procedure. The patient was taken to the procedure room. The upper back, neck, and occipital area was prepped with alcohol swabs. The 2 Botox vials containing 100 units each, were reconstituted with saline. There are 31 distinct targets in the standard protocol. Following muscles and units were injected:     10 units divided between 2 sites. Procerus 5 units in 1 site. Frontalis 20 units divided between 4 sites. Temporalis 40 units divided between 8 sites. Occipitalis 40 units divided between 8 sites. Cervical paraspinal 20 units divided between 4 sites. Trapezius 30 units divided between 6 sites. Patient tolerated the procedure well. Total of 165 Units of botulinum toxin were used and 35 Units were wasted. James Marty might have provided some extra benefit but unfortunately it was not affordable and therefore she stopped.

## 2022-03-25 ENCOUNTER — OFFICE VISIT (OUTPATIENT)
Dept: INTERNAL MEDICINE CLINIC | Facility: CLINIC | Age: 37
End: 2022-03-25
Payer: COMMERCIAL

## 2022-03-25 VITALS
HEART RATE: 88 BPM | BODY MASS INDEX: 33.19 KG/M2 | DIASTOLIC BLOOD PRESSURE: 68 MMHG | SYSTOLIC BLOOD PRESSURE: 102 MMHG | WEIGHT: 219 LBS | RESPIRATION RATE: 16 BRPM | HEIGHT: 68 IN

## 2022-03-25 DIAGNOSIS — E78.2 MIXED HYPERLIPIDEMIA: ICD-10-CM

## 2022-03-25 DIAGNOSIS — F50.81 BINGE EATING DISORDER: ICD-10-CM

## 2022-03-25 DIAGNOSIS — Z98.84 S/P BARIATRIC SURGERY: ICD-10-CM

## 2022-03-25 DIAGNOSIS — E51.9 THIAMINE DEFICIENCY: ICD-10-CM

## 2022-03-25 DIAGNOSIS — E53.8 VITAMIN B12 DEFICIENCY: ICD-10-CM

## 2022-03-25 DIAGNOSIS — R73.03 PREDIABETES: ICD-10-CM

## 2022-03-25 DIAGNOSIS — Z51.81 ENCOUNTER FOR THERAPEUTIC DRUG MONITORING: Primary | ICD-10-CM

## 2022-03-25 DIAGNOSIS — E66.9 CLASS 1 OBESITY WITH BODY MASS INDEX (BMI) OF 34.0 TO 34.9 IN ADULT, UNSPECIFIED OBESITY TYPE, UNSPECIFIED WHETHER SERIOUS COMORBIDITY PRESENT: ICD-10-CM

## 2022-03-25 PROCEDURE — 96372 THER/PROPH/DIAG INJ SC/IM: CPT | Performed by: PHYSICIAN ASSISTANT

## 2022-03-25 PROCEDURE — 99214 OFFICE O/P EST MOD 30 MIN: CPT | Performed by: PHYSICIAN ASSISTANT

## 2022-03-25 PROCEDURE — 3008F BODY MASS INDEX DOCD: CPT | Performed by: PHYSICIAN ASSISTANT

## 2022-03-25 PROCEDURE — 3074F SYST BP LT 130 MM HG: CPT | Performed by: PHYSICIAN ASSISTANT

## 2022-03-25 PROCEDURE — 3078F DIAST BP <80 MM HG: CPT | Performed by: PHYSICIAN ASSISTANT

## 2022-03-25 RX ORDER — THIAMINE HYDROCHLORIDE 100 MG/ML
100 INJECTION, SOLUTION INTRAMUSCULAR; INTRAVENOUS ONCE
Status: COMPLETED | OUTPATIENT
Start: 2022-03-25 | End: 2022-03-25

## 2022-03-25 RX ORDER — THIAMINE HYDROCHLORIDE 100 MG/ML
100 INJECTION, SOLUTION INTRAMUSCULAR; INTRAVENOUS ONCE
Status: CANCELLED | OUTPATIENT
Start: 2022-03-25 | End: 2022-03-25

## 2022-03-25 RX ADMIN — THIAMINE HYDROCHLORIDE 100 MG: 100 INJECTION, SOLUTION INTRAMUSCULAR; INTRAVENOUS at 10:50:00

## 2022-03-31 ENCOUNTER — NURSE ONLY (OUTPATIENT)
Dept: INTERNAL MEDICINE CLINIC | Facility: CLINIC | Age: 37
End: 2022-03-31
Payer: COMMERCIAL

## 2022-03-31 DIAGNOSIS — E53.8 B12 DEFICIENCY: Primary | ICD-10-CM

## 2022-03-31 PROCEDURE — 96372 THER/PROPH/DIAG INJ SC/IM: CPT | Performed by: NURSE PRACTITIONER

## 2022-03-31 RX ORDER — CYANOCOBALAMIN 1000 UG/ML
1000 INJECTION INTRAMUSCULAR; SUBCUTANEOUS ONCE
Status: COMPLETED | OUTPATIENT
Start: 2022-03-31 | End: 2022-03-31

## 2022-03-31 RX ADMIN — CYANOCOBALAMIN 1000 MCG: 1000 INJECTION INTRAMUSCULAR; SUBCUTANEOUS at 17:19:00

## 2022-05-13 ENCOUNTER — TELEMEDICINE (OUTPATIENT)
Dept: NEUROLOGY | Facility: CLINIC | Age: 37
End: 2022-05-13
Payer: COMMERCIAL

## 2022-05-13 DIAGNOSIS — G43.711 CHRONIC MIGRAINE WITHOUT AURA, INTRACTABLE, WITH STATUS MIGRAINOSUS: Primary | ICD-10-CM

## 2022-05-13 RX ORDER — UBROGEPANT 100 MG/1
100 TABLET ORAL 2 TIMES DAILY PRN
Qty: 10 TABLET | Refills: 11 | Status: SHIPPED | OUTPATIENT
Start: 2022-05-13 | End: 2022-06-12

## 2022-05-13 RX ORDER — RIMEGEPANT SULFATE 75 MG/75MG
75 TABLET, ORALLY DISINTEGRATING ORAL EVERY OTHER DAY
Qty: 15 TABLET | Refills: 3 | Status: SHIPPED | OUTPATIENT
Start: 2022-05-13 | End: 2022-06-12

## 2022-05-17 ENCOUNTER — TELEPHONE (OUTPATIENT)
Dept: NEUROLOGY | Facility: CLINIC | Age: 37
End: 2022-05-17

## 2022-05-17 NOTE — TELEPHONE ENCOUNTER
Received DENIAL fax from [employer] for Nurtec 75MG tablet. Reason: Patient does not meet the requirements.  Currently plan cover this drug when you are not using it with another CGRP receptor antagonist.

## 2022-05-18 ENCOUNTER — TELEPHONE (OUTPATIENT)
Dept: NEUROLOGY | Facility: CLINIC | Age: 37
End: 2022-05-18

## 2022-06-16 ENCOUNTER — OFFICE VISIT (OUTPATIENT)
Dept: NEUROLOGY | Facility: CLINIC | Age: 37
End: 2022-06-16
Payer: COMMERCIAL

## 2022-06-16 DIAGNOSIS — G43.711 CHRONIC MIGRAINE WITHOUT AURA, INTRACTABLE, WITH STATUS MIGRAINOSUS: Primary | ICD-10-CM

## 2022-06-16 NOTE — PROGRESS NOTES
PROCEDURE: Botox Injections (MIGRAINE PROTOCOL)   PRE-OPERATIVE DIAGNOSIS: Chronic Migraine  POST-OPERATIVE DIAGNOSIS: same as above   BLOOD LOSS: minimal COMPLICATIONS: none     DESCRIPTION OF PROCEDURE: After a discussion of the risks and benefits, the patient consented to the procedure. The patient was taken to the procedure room. The upper back, neck, and occipital area was prepped with alcohol swabs. The 2 Botox vials containing 100 units each, were reconstituted with saline. There are 31 distinct targets in the standard protocol. Following muscles and units were injected:     10 units divided between 2 sites. Procerus 5 units in 1 site. Frontalis 20 units divided between 4 sites. Temporalis 40 units divided between 8 sites. Occipitalis 40 units divided between 8 sites. Cervical paraspinal 20 units divided between 4 sites. Trapezius 30 units divided between 6 sites. Patient tolerated the procedure well. Total of 165 Units of botulinum toxin were used and 35 Units were wasted. Harsha Szymanski might have provided some extra benefit but unfortunately it was not affordable and therefore she stopped.

## 2022-07-21 ENCOUNTER — PATIENT MESSAGE (OUTPATIENT)
Dept: INTERNAL MEDICINE CLINIC | Facility: CLINIC | Age: 37
End: 2022-07-21

## 2022-07-21 ENCOUNTER — TELEPHONE (OUTPATIENT)
Dept: INTERNAL MEDICINE CLINIC | Facility: CLINIC | Age: 37
End: 2022-07-21

## 2022-07-22 NOTE — TELEPHONE ENCOUNTER
Requesting Ozempic 1 mg  LOV: 3/25/22  RTC: not noted  Last Relevant Labs: 7/28/21  Filled: 2/21/22 #9ml with 0 refills    Future Appointments   Date Time Provider Daisy Cisneros   9/23/2022  2:00 PM Jan Lopez PA-C [facility]     We cancelled her appt today

## 2022-07-22 NOTE — TELEPHONE ENCOUNTER
From: Sigifredo Roberts  Sent: 7/21/2022 10:25 PM CDT  To: [facility] Clinical Staff  Subject: appointment cancellation     Thank you for letting me know. I am out of Ozempic and need a renewed prescription sent to the pharmacy. I can reschedule for the next available Friday, preferably early morning.  Thank you

## 2022-07-22 NOTE — TELEPHONE ENCOUNTER
pts appt for 7/22/2022 was canceled- provider out of office, pt is requesting refill until next appt     Name of Medication: ozempic     Dose:     How is medication prescribed:    Specific name of pharmacy and location: [address], [address] [place] [address] [phone], [phone]    Name of mail order company:

## 2022-07-27 RX ORDER — SEMAGLUTIDE 1.34 MG/ML
INJECTION, SOLUTION SUBCUTANEOUS
Refills: 0 | OUTPATIENT
Start: 2022-07-27

## 2022-08-31 ENCOUNTER — TELEPHONE (OUTPATIENT)
Dept: NEUROLOGY | Facility: CLINIC | Age: 37
End: 2022-08-31

## 2022-09-16 ENCOUNTER — OFFICE VISIT (OUTPATIENT)
Dept: NEUROLOGY | Facility: CLINIC | Age: 37
End: 2022-09-16
Payer: COMMERCIAL

## 2022-09-16 VITALS — HEIGHT: 68 IN | WEIGHT: 225 LBS | BODY MASS INDEX: 34.1 KG/M2

## 2022-09-16 DIAGNOSIS — G43.711 CHRONIC MIGRAINE WITHOUT AURA, INTRACTABLE, WITH STATUS MIGRAINOSUS: Primary | ICD-10-CM

## 2022-09-16 PROCEDURE — 3008F BODY MASS INDEX DOCD: CPT | Performed by: OTHER

## 2022-09-16 PROCEDURE — 64615 CHEMODENERV MUSC MIGRAINE: CPT | Performed by: OTHER

## 2022-09-16 NOTE — PROCEDURES
PROCEDURE: Botox Injections (MIGRAINE PROTOCOL)   PRE-OPERATIVE DIAGNOSIS: Chronic Migraine  POST-OPERATIVE DIAGNOSIS: same as above   BLOOD LOSS: minimal COMPLICATIONS: none     DESCRIPTION OF PROCEDURE: After a discussion of the risks and benefits, the patient consented to the procedure. The patient was taken to the procedure room. The upper back, neck, and occipital area was prepped with alcohol swabs. The 2 Botox vials containing 100 units each, were reconstituted with saline. There are 31 distinct targets in the standard protocol. Following muscles and units were injected:     10 units divided between 2 sites. Procerus 5 units in 1 site. Frontalis 20 units divided between 4 sites. Temporalis 40 units divided between 8 sites. Occipitalis 40 units divided between 8 sites. Cervical paraspinal 20 units divided between 4 sites. Trapezius 30 units divided between 6 sites. Patient tolerated the procedure well. Total of 165 Units of botulinum toxin were used and 35 Units were wasted. Arelis Carson might have provided some extra benefit but unfortunately it was not affordable and therefore she stopped.

## 2022-10-06 ENCOUNTER — TELEPHONE (OUTPATIENT)
Dept: NEUROLOGY | Facility: CLINIC | Age: 37
End: 2022-10-06

## 2022-10-06 NOTE — TELEPHONE ENCOUNTER
Called [facility]  to initiate authorization for Botox 200 units cpt codes Erma Holt, 77988. Dx: U73.706.  Merry Eubanks initiated request.   Case # [id number] pending approval.   Buy & Bill Due 53.34.29

## 2022-10-07 NOTE — TELEPHONE ENCOUNTER
Received fax advising of approval for  authorization for Botox 200 units cpt codes Crissy Juarez, 25947. Dx: C39.894. Authorization PA# [employer] [id number] LS valid 10/06/2022 - 10/06/2023  Buy & Bill 200 units Due 12/12/22.  Per Pt's request. scheduled for next series of Botox injections on 12/16/22 at 10 am.

## 2022-10-17 ENCOUNTER — PATIENT MESSAGE (OUTPATIENT)
Dept: NEUROLOGY | Facility: CLINIC | Age: 37
End: 2022-10-17

## 2022-10-17 NOTE — TELEPHONE ENCOUNTER
From: Kierra Law  To: Griselda Ramming, DO  Sent: 10/17/2022 3:44 PM CDT  Subject: Berry Casper Authorization    Good afternoon,    I received a letter from my insurance (dated 10/3/2022) that says their authorization for Ubrelvy 100mg will  in 60 days. Can your office contact [facility] to update the clinical information to renew this authorization?      Thank you,  Ngoc Harrell

## 2022-11-22 NOTE — ED NOTES
POCT pregnancy tests defective x 4 with no control line appearing. Lab notified. Urine sent to lab for Urine Preg. Positioning (Leave Blank If You Do Not Want): The patient was placed in a comfortable position exposing the surgical site.

## 2022-12-05 ENCOUNTER — TELEPHONE (OUTPATIENT)
Dept: PHYSICAL MEDICINE AND REHAB | Facility: CLINIC | Age: 37
End: 2022-12-05

## 2022-12-05 ENCOUNTER — TELEMEDICINE (OUTPATIENT)
Dept: NEUROLOGY | Facility: CLINIC | Age: 37
End: 2022-12-05

## 2022-12-05 DIAGNOSIS — G43.711 CHRONIC MIGRAINE WITHOUT AURA, INTRACTABLE, WITH STATUS MIGRAINOSUS: Primary | ICD-10-CM

## 2022-12-05 RX ORDER — GALCANEZUMAB 120 MG/ML
120 INJECTION, SOLUTION SUBCUTANEOUS
Qty: 1.12 ML | Refills: 11 | Status: SHIPPED | OUTPATIENT
Start: 2022-12-05 | End: 2024-01-05

## 2022-12-05 RX ORDER — GALCANEZUMAB 120 MG/ML
INJECTION, SOLUTION SUBCUTANEOUS
Qty: 2 EACH | Refills: 0 | Status: SHIPPED | OUTPATIENT
Start: 2022-12-05 | End: 2023-01-02

## 2022-12-16 ENCOUNTER — OFFICE VISIT (OUTPATIENT)
Dept: NEUROLOGY | Facility: CLINIC | Age: 37
End: 2022-12-16
Payer: COMMERCIAL

## 2022-12-16 DIAGNOSIS — G43.711 CHRONIC MIGRAINE WITHOUT AURA, INTRACTABLE, WITH STATUS MIGRAINOSUS: Primary | ICD-10-CM

## 2022-12-16 NOTE — PROCEDURES
PROCEDURE: Botox Injections (MIGRAINE PROTOCOL)   PRE-OPERATIVE DIAGNOSIS: Chronic Migraine  POST-OPERATIVE DIAGNOSIS: same as above   BLOOD LOSS: minimal COMPLICATIONS: none     DESCRIPTION OF PROCEDURE: After a discussion of the risks and benefits, the patient consented to the procedure. The patient was taken to the procedure room. The upper back, neck, and occipital area was prepped with alcohol swabs. The 2 Botox vials containing 100 units each, were reconstituted with saline. There are 31 distinct targets in the standard protocol. Following muscles and units were injected:     10 units divided between 2 sites. Procerus 5 units in 1 site. Frontalis 20 units divided between 4 sites. Temporalis 40 units divided between 8 sites. Occipitalis 40 units divided between 8 sites. Cervical paraspinal 20 units divided between 4 sites. Trapezius 30 units divided between 6 sites. Patient tolerated the procedure well. Total of 165 Units of botulinum toxin were used and 35 Units were wasted. Vonda Silversmith might have provided some extra benefit but unfortunately it was not affordable and therefore she stopped.

## 2022-12-27 ENCOUNTER — TELEPHONE (OUTPATIENT)
Dept: ORTHOPEDICS CLINIC | Facility: CLINIC | Age: 37
End: 2022-12-27

## 2022-12-27 DIAGNOSIS — M79.672 LEFT FOOT PAIN: Primary | ICD-10-CM

## 2022-12-27 NOTE — TELEPHONE ENCOUNTER
NP Pain in heel left heel. Please advise if imaging is needed.   Future Appointments   Date Time Provider Daisy Cisneros   12/29/2022  8:30 AM James Romeo DPM [facility]

## 2022-12-28 ENCOUNTER — HOSPITAL ENCOUNTER (OUTPATIENT)
Dept: GENERAL RADIOLOGY | Age: 37
Discharge: HOME | End: 2022-12-28
Attending: PODIATRIST
Payer: COMMERCIAL

## 2022-12-28 DIAGNOSIS — M79.672 LEFT FOOT PAIN: ICD-10-CM

## 2022-12-28 PROCEDURE — 73630 X-RAY EXAM OF FOOT: CPT | Performed by: PODIATRIST

## 2022-12-29 ENCOUNTER — OFFICE VISIT (OUTPATIENT)
Dept: ORTHOPEDICS CLINIC | Facility: CLINIC | Age: 37
End: 2022-12-29
Payer: COMMERCIAL

## 2022-12-29 VITALS — WEIGHT: 225 LBS | BODY MASS INDEX: 34.1 KG/M2 | HEIGHT: 68 IN

## 2022-12-29 DIAGNOSIS — M79.672 CHRONIC HEEL PAIN, LEFT: ICD-10-CM

## 2022-12-29 DIAGNOSIS — M76.62 ACHILLES TENDINITIS OF LEFT LOWER EXTREMITY: ICD-10-CM

## 2022-12-29 DIAGNOSIS — G89.29 CHRONIC HEEL PAIN, LEFT: ICD-10-CM

## 2022-12-29 DIAGNOSIS — M79.672 LEFT FOOT PAIN: ICD-10-CM

## 2022-12-29 DIAGNOSIS — M72.2 PLANTAR FASCIITIS OF LEFT FOOT: Primary | ICD-10-CM

## 2022-12-29 PROCEDURE — 3008F BODY MASS INDEX DOCD: CPT | Performed by: PODIATRIST

## 2022-12-29 PROCEDURE — 99204 OFFICE O/P NEW MOD 45 MIN: CPT | Performed by: PODIATRIST

## 2022-12-29 PROCEDURE — 20600 DRAIN/INJ JOINT/BURSA W/O US: CPT | Performed by: PODIATRIST

## 2022-12-29 RX ORDER — BETAMETHASONE SODIUM PHOSPHATE AND BETAMETHASONE ACETATE 3; 3 MG/ML; MG/ML
4.2 INJECTION, SUSPENSION INTRA-ARTICULAR; INTRALESIONAL; INTRAMUSCULAR; SOFT TISSUE ONCE
Status: COMPLETED | OUTPATIENT
Start: 2022-12-29 | End: 2022-12-29

## 2022-12-29 RX ADMIN — BETAMETHASONE SODIUM PHOSPHATE AND BETAMETHASONE ACETATE 4.2 MG: 3; 3 INJECTION, SUSPENSION INTRA-ARTICULAR; INTRALESIONAL; INTRAMUSCULAR; SOFT TISSUE at 08:50:00

## 2023-01-06 ENCOUNTER — TELEPHONE (OUTPATIENT)
Dept: PHYSICAL MEDICINE AND REHAB | Facility: CLINIC | Age: 38
End: 2023-01-06

## 2023-01-06 NOTE — TELEPHONE ENCOUNTER
Botox cpt codes , 63356 Dx: P67.649 Authorization PA# [employer] [id number] LS valid 10/06/2022 - 10/06/2023 Buy & Bill 200 units Due 03/11/23    1.) Name (From Insurance): UMR  2.) Insurance Type: [facility]  3.) Botox amount (Units): 200  4.) Number of appointments approved:   5.) Dates approved: 10/06/2022 - 10/06/2023  6.) Due Date: 03/11/23  7.) [de-identified] & Bill    8.) CPT codes: , 35268  0.) Dx: I88.559  53.) Authorization: Anshul Spencer Option [id number]   11.) Reference Number: na    Will inform Nursing.

## 2023-01-17 DIAGNOSIS — G43.119 INTRACTABLE MIGRAINE WITH AURA WITHOUT STATUS MIGRAINOSUS: ICD-10-CM

## 2023-01-17 DIAGNOSIS — G43.711 CHRONIC MIGRAINE WITHOUT AURA, INTRACTABLE, WITH STATUS MIGRAINOSUS: Primary | ICD-10-CM

## 2023-01-18 RX ORDER — UBROGEPANT 100 MG/1
100 TABLET ORAL 2 TIMES DAILY PRN
Qty: 10 TABLET | Refills: 11 | Status: SHIPPED | OUTPATIENT
Start: 2023-01-18 | End: 2023-02-17

## 2023-01-23 ENCOUNTER — TELEPHONE (OUTPATIENT)
Dept: PHYSICAL THERAPY | Facility: HOSPITAL | Age: 38
End: 2023-01-23

## 2023-01-24 ENCOUNTER — OFFICE VISIT (OUTPATIENT)
Dept: PHYSICAL THERAPY | Age: 38
End: 2023-01-24
Attending: PODIATRIST
Payer: COMMERCIAL

## 2023-01-24 DIAGNOSIS — M79.672 LEFT FOOT PAIN: ICD-10-CM

## 2023-01-24 DIAGNOSIS — G89.29 CHRONIC HEEL PAIN, LEFT: ICD-10-CM

## 2023-01-24 DIAGNOSIS — M72.2 PLANTAR FASCIITIS OF LEFT FOOT: ICD-10-CM

## 2023-01-24 DIAGNOSIS — M79.672 CHRONIC HEEL PAIN, LEFT: ICD-10-CM

## 2023-01-24 DIAGNOSIS — M76.62 ACHILLES TENDINITIS OF LEFT LOWER EXTREMITY: ICD-10-CM

## 2023-01-24 PROCEDURE — 97110 THERAPEUTIC EXERCISES: CPT

## 2023-01-24 PROCEDURE — 97161 PT EVAL LOW COMPLEX 20 MIN: CPT

## 2023-01-26 ENCOUNTER — TELEPHONE (OUTPATIENT)
Dept: PHYSICAL THERAPY | Facility: HOSPITAL | Age: 38
End: 2023-01-26

## 2023-01-26 ENCOUNTER — OFFICE VISIT (OUTPATIENT)
Dept: PHYSICAL THERAPY | Age: 38
End: 2023-01-26
Attending: PODIATRIST
Payer: COMMERCIAL

## 2023-01-26 PROCEDURE — 97110 THERAPEUTIC EXERCISES: CPT

## 2023-01-27 NOTE — PROGRESS NOTES
Insurance (Authorized # of Visits):  [facility]         Diagnosis:   Left foot pain (H68.192)  Plantar fasciitis of left foot (M72.2)  Chronic heel pain, left (M79.672,G89.29)  Achilles tendinitis of left lower extremity (H81.77)          Referring Provider: Christopher Foreman   Date of Evaluation:    1/24/2023  Precautions:  None  Next MD visit:   none scheduled  Date of Surgery: n                Subjective: Pt describes pain level, at worst 10/10. Sharp pain  Current functional limitations include ability to stand, ambulate and do housework. Did home program, reports no change. Objective:   (* denotes performed with pain)  Tested 1/26/2023:    AROM: (* denotes performed with pain)  (R tested   Foot/Ankle   DF: R -5 gastroc and 10 soleus bias; L 10 gastroc* and 10 soleus bias  PF: R 85; L 80  INV: R 50 , L 55  EV: R 30; L 45*               Strength/MMT: (* denotes performed with pain)    Foot/Ankle   PF: R 5/5; L 3-/5*  INV: L 3-/5* (posterior), 5/5 anterior ; R 5/5        Stance: Flexible flatfoot upon stance and worse on the left with some mild ankle valgus. Gait: pt ambulates on level ground with trendelenberg 0/10 pain    Intact:  Balance: SLS: intact bilaterally      Tested 1/24/2023:    Strength/MMT: (* denotes performed with pain)  Toe flexion: 3-/5 L* and 4/5 R    Special tests:   Girth figure 8 (50 cm L and 53 cm R)     Accessory motion: intact talocrucal joint, min loss calcaneal joint L (lateral*/medial* and anterior*/posterior*) and intact R calcaneal joint. Palpation:   Pain at insertional achilles and proximallly       Assessment: Given trial of eversion stretching.       Goals: (to be met in 16 visits)   Pt will improve LEFS score from 35% to 65% to display improved ability to perform housework  Pt will reduce pain at its worst from 10/10 to 7/10 to allow for improved ability to stand to do dishes  Pt will dorsiflexion ROM to 10 degrees to allow for improved ability to ambulate  Pt will be independent with home program to allow for maintenance of goals achieved in therapy. Plan:  Make sure no worse after slide foot under chair and dorsiflexion stretching.   -Can she progress to loaded dorsiflexion stretching? If not see loaded dorsiflexion stretching with eversion bias (towels under arch)  Vs. Sustained sitting eversion stretching. Isometric strengthening (future bands)    Progress toe strength. Future: Toe extension stretching with foot on wall/doorframe. Charges: 3 there ex     Total Timed Treatment: 38 min  Total Treatment Time: 38 min  Date: 1/26/2023  Tx#: 2 Date: Tx#: 3 Date: Tx#: 4 Date: Tx#: 5 Date: Tx#: 6   Ther-Ex  Talo crural glides mobilization grade 3, 5 minutes therapist forces    Dorsiflexion supine therapist forces 45': better plantar flexion strength and foot ROM    Dorsiflexion standing knees bent x15: better ROM, 2nd set with ending on 90 second hold: worse ROM and plantar flexion now more painful. 3rd set x15 after eversion stretching, see below, worse plantar flexion is more painful    Dorsiflexion slide foot under chair x15: better (done after eversion stretching see below)    Eversion stretching sitting x5-20 (3 sets): better    Toe curls x1    Toe extension foot on ground plantar fascia stretch x2      Educated on home program, see below. Verbal, visual and tactile cues for there ex.            Manual         N Re-Ed

## 2023-01-31 ENCOUNTER — OFFICE VISIT (OUTPATIENT)
Dept: PHYSICAL THERAPY | Age: 38
End: 2023-01-31
Attending: PODIATRIST
Payer: COMMERCIAL

## 2023-01-31 PROCEDURE — 97110 THERAPEUTIC EXERCISES: CPT

## 2023-02-01 NOTE — PATIENT INSTRUCTIONS
Exercises 1/31/2023  Every 2 hours  Cross your legs and push foot down to the floor. Push down 40 times. Picture is wrong: no band. Before and after: check up on toes of left foot, hands on wall      Three times a day:  Sit and curl toes 60 times.  With a pen as legs are crossed    Push foot up into other foot 20 times    Push foot down into the gas pedal 20 times  Push foot into other leg 20 times  Push foot sideways into a [id number] times    Lay on stomach raise leg up 20 times    s  Lay on right side, leg behind you, raise leg to ceiling 20 times          Before and after: check up on toes of left foot, hands on wall

## 2023-02-01 NOTE — PROGRESS NOTES
Insurance (Authorized # of Visits):  [facility]         Diagnosis:   Left foot pain (B49.492)  Plantar fasciitis of left foot (M72.2)  Chronic heel pain, left (M79.672,G89.29)  Achilles tendinitis of left lower extremity (F28.91)          Referring Provider: Trey Orellana   Date of Evaluation:    1/24/2023  Precautions:  None  Next MD visit:   none scheduled  Date of Surgery: n/a                Subjective: Pt describes pain level, at worst 10/10. Sharp pain  Current functional limitations include ability to stand, ambulate and do housework. Did home program, reports no change. However, after eversion stretching feels better. Objective:   (* denotes performed with pain)  Tested 1/31/2023:    AROM: (* denotes performed with pain)  (R tested   Foot/Ankle   DF: R -5 gastroc and 10 soleus bias; L 8 gastroc and 5 soleus bias  PF: R 85; L 88  INV: R 50 , L 50  EV: R 30; L 45*             Strength/MMT: (* denotes performed with pain)    Foot/Ankle   PF: R 5/5; L 4-/5* (5/10 pain)  INV: L 5/5 (posterior), R 5/5    Toe flexion: 4/5 L and 4/5 R        Stance: Flexible flatfoot upon stance and worse on the left with some mild ankle valgus. Tested 1/24/2023:    Special tests:   Girth figure 8 (50 cm L and 53 cm R)     Accessory motion: intact talocrucal joint, min loss calcaneal joint L (lateral*/medial* and anterior*/posterior*) and intact R calcaneal joint. Palpation:   Pain at insertional achilles and proximallly       Assessment: Able to progress home program for foot strength. Displays improved foot strength at start of session, objectively improving.        Goals: (to be met in 16 visits)   Pt will improve LEFS score from 35% to 65% to display improved ability to perform housework  Pt will reduce pain at its worst from 10/10 to 7/10 to allow for improved ability to stand to do dishes  Pt will dorsiflexion ROM to 10 degrees to allow for improved ability to ambulate  Pt will be independent with home program to allow for maintenance of goals achieved in therapy. Plan:  See about manual therapy supination stretching (more rotation)  4 way ankle strength with bands  Balance (narrow or tandem)    Soft tissue massage    Future:   Make sure no worse after slide foot under chair and dorsiflexion stretching.   -Can she progress to loaded dorsiflexion stretching? Future: Toe extension stretching with foot on wall/doorframe. Charges: 3 there ex     Total Timed Treatment: 38 min  Total Treatment Time: 38 min  Date: 1/26/2023  Tx#: 2 Date: 1/31/2023  Tx#: 3 Date: Tx#: 4 Date: Tx#: 5 Date: Tx#: 6   Ther-Ex  Talo crural glides mobilization grade 3, 5 minutes therapist forces    Dorsiflexion supine therapist forces 45': better plantar flexion strength and foot ROM    Dorsiflexion standing knees bent x15: better ROM, 2nd set with ending on 90 second hold: worse ROM and plantar flexion now more painful. 3rd set x15 after eversion stretching, see below, worse plantar flexion is more painful    Dorsiflexion slide foot under chair x15: better (done after eversion stretching see below)    Eversion stretching sitting x5-20 (3 sets): better    Toe curls x1    Toe extension foot on ground plantar fascia stretch x2      Educated on home program, see below. Verbal, visual and tactile cues for there ex. There ex:  Sustained sitting eversion stretching 90': after no better (better eversion    Dorsiflexion stretching with eversion bias (towels under arch) x10: produce, worse after    Eversion stretching dynamic x30-40 (4 sets, one therapist overpressure), somewhat better    Prone hip extension 15x2    Prone hip abduction 15x2    Toe curl 60'     Toe curl 20x3 into pen    Toe curl  objects 60'    Toe curl towel curl 60'    Isometric foot dorsiflexion, inversion, eversion and plantar flexion x20    Educated on home program, see below. Verbal, visual and tactile cues for there ex.           Manual         N Re-Ed

## 2023-02-02 ENCOUNTER — OFFICE VISIT (OUTPATIENT)
Dept: PHYSICAL THERAPY | Age: 38
End: 2023-02-02
Attending: PODIATRIST
Payer: COMMERCIAL

## 2023-02-02 PROCEDURE — 97140 MANUAL THERAPY 1/> REGIONS: CPT

## 2023-02-02 PROCEDURE — 97110 THERAPEUTIC EXERCISES: CPT

## 2023-02-03 NOTE — PROGRESS NOTES
Insurance (Authorized # of Visits):  [facility]         Diagnosis:   Left foot pain (R48.716)  Plantar fasciitis of left foot (M72.2)  Chronic heel pain, left (M79.672,G89.29)  Achilles tendinitis of left lower extremity (B28.27)          Referring Provider: Marcela Smith   Date of Evaluation:    1/24/2023  Precautions:  None  Next MD visit:   none scheduled  Date of Surgery: n/a                Subjective: Pt describes pain level, at worst 10/10. Sharp pain  Current functional limitations include ability to stand, ambulate and do housework. Did home program, 50% overall improvement since starting Physical Therapy. However, after eversion stretching feels better. Objective:   (* denotes performed with pain)  Tested 2/2/2023:    Palpation:   Pain at insertional achilles and proximallly     AROM: (* denotes performed with pain)  (R tested   Foot/Ankle   DF: R 10 soleus bias; L  6 soleus bias  EV: R 30; L 60             Strength/MMT: (* denotes performed with pain)    Foot/Ankle   PF: R 5/5; L 4-/5* (5/10 pain)    Toe flexion: 4/5 L and 4/5 R (tested 1/31/2023)        Tested 1/31/2023:  Stance: Flexible flatfoot upon stance and worse on the left with some mild ankle valgus. Tested 1/24/2023:    Special tests:   Girth figure 8 (50 cm L and 53 cm R)     Accessory motion: intact talocrucal joint, min loss calcaneal joint L (lateral*/medial* and anterior*/posterior*) and intact R calcaneal joint. Assessment: Given trial of prone press up hips to the R because pain with plantar flexion strengthening is reduced from 5/10 to 3/10 after stretching in this directions suggesting that some pain is referred from her lumbar spine. Home program progressed strength for dorsiflexion strengthening with band.        Goals: (to be met in 16 visits)   Pt will improve LEFS score from 35% to 65% to display improved ability to perform housework  Pt will reduce pain at its worst from 10/10 to 7/10 to allow for improved ability to stand to do dishes  Pt will dorsiflexion ROM to 10 degrees to allow for improved ability to ambulate  Pt will be independent with home program to allow for maintenance of goals achieved in therapy. Plan:  Exhaust prone press up hips to the R    (Calcaneal assessment/movements)    Add to home program likely:  Balance (narrow or tandem) (no worse 2/2/2023, but maybe worse 2nd set)    Toe extension stretching with foot on wall/doorframe. (Make sure no worse)    Band inversion and eversion strengthening      Future:   Make sure no worse after slide foot under chair and dorsiflexion stretching.   -Can she progress to loaded dorsiflexion stretching? Can she do plantar flexion into a band: worse on 2/2/2023     4 way ankle strength with bands        Charges: 3 there ex, 1 manual therapy    Total Timed Treatment: 54 min  Total Treatment Time: 54 min  Date: 1/26/2023  Tx#: 2 Date: 1/31/2023  Tx#: 3 Date: 2/2/2023  Tx#: 4 Date: Tx#: 5 Date: Tx#: 6   Ther-Ex  Talo crural glides mobilization grade 3, 5 minutes therapist forces    Dorsiflexion supine therapist forces 45': better plantar flexion strength and foot ROM    Dorsiflexion standing knees bent x15: better ROM, 2nd set with ending on 90 second hold: worse ROM and plantar flexion now more painful. 3rd set x15 after eversion stretching, see below, worse plantar flexion is more painful    Dorsiflexion slide foot under chair x15: better (done after eversion stretching see below)    Eversion stretching sitting x5-20 (3 sets): better    Toe curls x1    Toe extension foot on ground plantar fascia stretch x2      Educated on home program, see below. Verbal, visual and tactile cues for there ex.      There ex:  Sustained sitting eversion stretching 90': after no better (better eversion    Dorsiflexion stretching with eversion bias (towels under arch) x10: produce, worse after    Eversion stretching dynamic x30-40 (4 sets, one therapist overpressure), somewhat better    Prone hip extension 15x2    Prone hip abduction 15x2    Toe curl 60'     Toe curl 20x3 into pen    Toe curl  objects 60'    Toe curl towel curl 60'    Isometric foot dorsiflexion, inversion, eversion and plantar flexion x20    Educated on home program, see below. Verbal, visual and tactile cues for there ex. There ex:  Sustained on elbows 60', prone lumbar mobilization therapist mobilization and therapist overpressure L5, L3 and T12 x6 each level: no effect    Shift correction hips to the R x10: a bit better (pain now 4/10 with plantar flexion)    Prone press up to the R 3-10x6: somewhat better (pain now 3/10 with plantar flexion)    Prone leg raise hip extension x20    Side lying hip abduction x20    Tandem stance 30': no worse 2nd set maybe worse    Tandem stance with just toes in front 30'; after worse     Plantar flexion push down into yellow band x20: after worse    Inversion isometric x10    Plantar flexion isometric x20    Eversion isometric x5    Dorsiflexion with green or yellow band x20    Eversion stretching x40    Educated on home program, see below. Verbal, visual and tactile cues for there ex.          Manual    Manual 8 minutes  supination stretching (more rotation) therapist forces x20: no effect    Soft tissue massage     N Re-Ed

## 2023-02-07 ENCOUNTER — OFFICE VISIT (OUTPATIENT)
Dept: PHYSICAL THERAPY | Age: 38
End: 2023-02-07
Attending: PODIATRIST
Payer: COMMERCIAL

## 2023-02-07 PROCEDURE — 97110 THERAPEUTIC EXERCISES: CPT

## 2023-02-07 PROCEDURE — 97140 MANUAL THERAPY 1/> REGIONS: CPT

## 2023-02-08 NOTE — PROGRESS NOTES
Insurance (Authorized # of Visits):  [facility]         Diagnosis:   Left foot pain (T66.052)  Plantar fasciitis of left foot (M72.2)  Chronic heel pain, left (M79.672,G89.29)  Achilles tendinitis of left lower extremity (H59.46)          Referring Provider: Mariah Landeros   Date of Evaluation:    1/24/2023  Precautions:  None  Next MD visit:   none scheduled  Date of Surgery: n/a                Subjective: Pt describes pain level, at worst 10/10. Current functional limitations include ability to stand, ambulate and do housework. Had more intense pain after walking for shopping two days ago. No apparent reason. No change after about 8 hours of back stretching. Objective:   (* denotes performed with pain)  Tested 2/7/2023:  Special tests:  Gait 7/10 and antalgesic gait    Palpation:   Pain at insertional achilles and proximallly     AROM: (* denotes performed with pain)  (R tested 2/3/2023)  Foot/Ankle   DF: R 10 soleus bias, 11 gastroc bias; L 4  soleus bias, 0 gastroc bias  Plantar flexion: 80 L* and 85 R (left affected)           Tested 2/3/2023:  Strength/MMT: (* denotes performed with pain)    Foot/Ankle   PF: R 5/5; L 4-/5* (5/10 pain)    Toe flexion: 4/5 L and 4/5 R (tested 1/31/2023)        Tested 1/31/2023:  Stance: Flexible flatfoot upon stance and worse on the left with some mild ankle valgus. Tested 1/24/2023:    Special tests:   Girth figure 8 (50 cm L and 53 cm R)     Accessory motion: intact talocrucal joint, min loss calcaneal joint L (lateral*/medial* and anterior*/posterior*) and intact R calcaneal joint. Assessment: Given trial of medial calcaneal mobilization.        Goals: (to be met in 16 visits)   Pt will improve LEFS score from 35% to 65% to display improved ability to perform housework  Pt will reduce pain at its worst from 10/10 to 7/10 to allow for improved ability to stand to do dishes  Pt will dorsiflexion ROM to 10 degrees to allow for improved ability to ambulate  Pt will be independent with home program to allow for maintenance of goals achieved in therapy. Plan:  Exhaust medial calcaneal glides    Add to home program likely:  Balance (narrow or tandem) (no worse 2/2/2023, but maybe worse 2nd set)    Toe extension stretching with foot on wall/doorframe. (Make sure no worse)    Band inversion and eversion strengthening      Future:   Make sure no worse after slide foot under chair and dorsiflexion stretching.   -Can she progress to loaded dorsiflexion stretching? Can she do plantar flexion into a band: worse on 2/2/2023     4 way ankle strength with bands        Charges: 1 there ex, 2 manual therapy    Total Timed Treatment: 38 min  Total Treatment Time: 38 min  Date: 1/26/2023  Tx#: 2 Date: 1/31/2023  Tx#: 3 Date: 2/2/2023  Tx#: 4 Date: 2/7/2023  Tx#: 5 Date: 2/7/2023    Tx#: 6   Ther-Ex  Talo crural glides mobilization grade 3, 5 minutes therapist forces    Dorsiflexion supine therapist forces 45': better plantar flexion strength and foot ROM    Dorsiflexion standing knees bent x15: better ROM, 2nd set with ending on 90 second hold: worse ROM and plantar flexion now more painful. 3rd set x15 after eversion stretching, see below, worse plantar flexion is more painful    Dorsiflexion slide foot under chair x15: better (done after eversion stretching see below)    Eversion stretching sitting x5-20 (3 sets): better    Toe curls x1    Toe extension foot on ground plantar fascia stretch x2      Educated on home program, see below. Verbal, visual and tactile cues for there ex.      There ex:  Sustained sitting eversion stretching 90': after no better (better eversion    Dorsiflexion stretching with eversion bias (towels under arch) x10: produce, worse after    Eversion stretching dynamic x30-40 (4 sets, one therapist overpressure), somewhat better    Prone hip extension 15x2    Prone hip abduction 15x2    Toe curl 60'     Toe curl 20x3 into pen    Toe curl  objects 60'    Toe curl towel curl 60'    Isometric foot dorsiflexion, inversion, eversion and plantar flexion x20    Educated on home program, see below. Verbal, visual and tactile cues for there ex. There ex:  Sustained on elbows 60', prone lumbar mobilization therapist mobilization and therapist overpressure L5, L3 and T12 x6 each level: no effect    Shift correction hips to the R x10: a bit better (pain now 4/10 with plantar flexion)    Prone press up to the R 3-10x6: somewhat better (pain now 3/10 with plantar flexion)    Prone leg raise hip extension x20    Side lying hip abduction x20    Tandem stance 30': no worse 2nd set maybe worse    Tandem stance with just toes in front 30'; after worse     Plantar flexion push down into yellow band x20: after worse    Inversion isometric x10    Plantar flexion isometric x20    Eversion isometric x5    Dorsiflexion with green or yellow band x20    Eversion stretching x40    Educated on home program, see below. Verbal, visual and tactile cues for there ex. There ex: Inversion isometric x20    Plantar flexion isometric x20    Eversion isometric x20    Dorsiflexion with red band x15    Eversion stretching x40    Self-calcaneal medial glide 5 minutes    Educated on home program, see below. Verbal, visual and tactile cues for there ex. Manual    Manual 8 minutes  supination stretching (more rotation) therapist forces x20: no effect    Soft tissue massage Manual 23 minutes  Calcaneal medial glides.      N Re-Ed

## 2023-02-09 ENCOUNTER — OFFICE VISIT (OUTPATIENT)
Dept: PHYSICAL THERAPY | Age: 38
End: 2023-02-09
Attending: PODIATRIST
Payer: COMMERCIAL

## 2023-02-09 PROCEDURE — 97140 MANUAL THERAPY 1/> REGIONS: CPT

## 2023-02-09 PROCEDURE — 97110 THERAPEUTIC EXERCISES: CPT

## 2023-02-10 NOTE — PROGRESS NOTES
Insurance (Authorized # of Visits):  [facility]         Diagnosis:   Left foot pain (V15.529)  Plantar fasciitis of left foot (M72.2)  Chronic heel pain, left (M79.672,G89.29)  Achilles tendinitis of left lower extremity (A02.10)          Referring Provider: Ephraim Dodson   Date of Evaluation:    1/24/2023  Precautions:  None  Next MD visit:   none scheduled  Date of Surgery: n/a                Subjective: Pt describes pain level, at worst 6/10. Current functional limitations include ability to stand, ambulate and do housework. Overall better, much better than two days ago, but still pain with prolonbed weight bearing. Objective:   (* denotes performed with pain)  Tested 2/9/2023:  Strength/MMT: (* denotes performed with pain)    Foot/Ankle   PF: R 5/5; L 4-/5* (5/10 pain)    Toe flexion: 4-/5 L* (2nd toe) and 4/5 R (tested 1/31/2023)          AROM: (* denotes performed with pain)  (R tested 2/3/2023)  Foot/Ankle   DF: R 10 soleus bias, 11 gastroc bias; L 15  soleus bias, 12 gastroc bias  Plantar flexion: 82 L and 85 R (left affected)    2nd toe left foot: min/nil flexion*, extension: min*        Palpation: swollen below toes of left foot    Tested 2/7/2023:  Palpation:   Pain at insertional achilles and proximallly       Tested 1/31/2023:  Stance: Flexible flatfoot upon stance and worse on the left with some mild ankle valgus. Tested 1/24/2023:    Special tests:   Girth figure 8 (50 cm L and 53 cm R)     Accessory motion: intact talocrucal joint, min loss calcaneal joint L (lateral*/medial* and anterior*/posterior*) and intact R calcaneal joint. Assessment: Better after medial calcaneal mobilization, home program progressed for strength. Displays in session improvement.        Goals: (to be met in 16 visits)   Pt will improve LEFS score from 35% to 65% to display improved ability to perform housework  Pt will reduce pain at its worst from 10/10 to 7/10 to allow for improved ability to stand to do dishes  Pt will dorsiflexion ROM to 10 degrees to allow for improved ability to ambulate  Pt will be independent with home program to allow for maintenance of goals achieved in therapy. Plan:  Make sure no worse after slide foot under chair and dorsiflexion stretching.   -Can she progress to loaded dorsiflexion stretching? Toe extension stretching with foot on wall/doorframe. (Make sure no worse)    medial calcaneal glides (do these still help with mobilization?)    2nd toe:  See if repeated toe extension improves pain with flexion end range pain  (consider dysfunction of 2nd toe. .. to extension/flexion)      Add to home program likely:  Balance (narrow or tandem) (no worse 2/2/2023, but maybe worse 2nd set)     eversion strengthening with band    NMES toe flexion    Maybe:  Can she do plantar flexion into a band: worse on 2/2/2023        Charges: 2 there ex, 1 manual therapy    Total Timed Treatment: 38 min  Total Treatment Time: 38 min  Date: 2/2/2023  Tx#: 4 Date: 2/7/2023  Tx#: 5 Date: 2/9/2023  Tx#: 6       There ex:  Sustained on elbows 60', prone lumbar mobilization therapist mobilization and therapist overpressure L5, L3 and T12 x6 each level: no effect    Shift correction hips to the R x10: a bit better (pain now 4/10 with plantar flexion)    Prone press up to the R 3-10x6: somewhat better (pain now 3/10 with plantar flexion)    Prone leg raise hip extension x20    Side lying hip abduction x20    Tandem stance 30': no worse 2nd set maybe worse    Tandem stance with just toes in front 30'; after worse     Plantar flexion push down into yellow band x20: after worse    Inversion isometric x10    Plantar flexion isometric x20    Eversion isometric x5    Dorsiflexion with green or yellow band x20    Eversion stretching x40    Educated on home program, see below. Verbal, visual and tactile cues for there ex. There ex:     Inversion isometric x20    Plantar flexion isometric x20    Eversion isometric x20    Dorsiflexion with red band x15    Eversion stretching x40    Self-calcaneal medial glide 5 minutes    Educated on home program, see below. Verbal, visual and tactile cues for there ex. There ex:  medial calcaneal glides 1-20x3 (2nd set sustained 2 minutes)    Band inversion blue 20x3    Toe object  2'    Toe towel curls 1-2 minutes x2    Self-2nd toe inferior/superior glides 5 minutes    Flexion of 2nd toe repeated x10 with therapaist overpressure: produce, no worse. Educated on home program, see below. Verbal, visual and tactile cues for there ex. Manual 8 minutes  supination stretching (more rotation) therapist forces x20: no effect    Soft tissue massage Manual 23 minutes  Calcaneal medial glides.   Manual 15 minutes:  medial calcaneal glides: better 1st set, no change 2nd set (mobilization sidelying, grade 4)    2nd toe mobilization superior/inferior: better, abolish pain with flexion    Soft tissue massage to foot

## 2023-02-14 ENCOUNTER — OFFICE VISIT (OUTPATIENT)
Dept: PHYSICAL THERAPY | Age: 38
End: 2023-02-14
Attending: PODIATRIST
Payer: COMMERCIAL

## 2023-02-14 PROCEDURE — 97110 THERAPEUTIC EXERCISES: CPT

## 2023-02-14 PROCEDURE — 97140 MANUAL THERAPY 1/> REGIONS: CPT

## 2023-02-15 NOTE — PROGRESS NOTES
Progress Summary  Pt has attended 7 visits in Physical Therapy. Insurance (Authorized # of Visits):  [facility]         Diagnosis:   Left foot pain (Z82.663)  Plantar fasciitis of left foot (M72.2)  Chronic heel pain, left (M79.672,G89.29)  Achilles tendinitis of left lower extremity (D74.96)          Referring Provider: Rema Del Real   Date of Evaluation:    1/24/2023  Precautions:  None  Next MD visit:   none scheduled  Date of Surgery: n/a                Subjective: Pt describes pain level, at worst 6/10. Current functional limitations include ability to stand, ambulate and do housework. Overall about 60% improved since starting therapy. Can walk short distances with much less pain. However, still pain with prolonbed weight bearing. Objective:   (* denotes performed with pain)  Tested 2/14/2023:  Strength/MMT: (* denotes performed with pain)    Foot/Ankle   PF: R 5/5; L 4-/5* (3/10 pain)    Toe flexion: 4-/5 L (2nd toe) and 4/5 R (tested 1/31/2023)        AROM: (* denotes performed with pain)  2nd toe left foot: min/nil flexion*, extension: min/nil* and palpation to 2nd MCP joint    Special Tests:  Loaded dorsiflexion with knee bent* (familiar pain)    Tested 2/9/2023:  AROM: (* denotes performed with pain)  (R tested 2/3/2023)  Foot/Ankle   DF: R 10 soleus bias, 11 gastroc bias; L 10  soleus bias, 10 gastroc bias  Plantar flexion: 82 L and 85 R (left affected)            Palpation: swollen below toes of left foot    Tested 2/7/2023:  Palpation:   Pain at insertional achilles and proximallly       Tested 1/31/2023:  Stance: Flexible flatfoot upon stance and worse on the left with some mild ankle valgus. Tested 1/24/2023:    Special tests:   Girth figure 8 (50 cm L and 53 cm R)     Accessory motion: intact talocrucal joint, min loss calcaneal joint L (lateral*/medial* and anterior*/posterior*) and intact R calcaneal joint.       Assessment: Reports pain has reduced from 10/10 at its worst to 6/10 and LEFS score has improved 46.25%. Reports ability to walk short distances has improved considerably, but still limited with prolonged standing or walking for times longer than 30 minutes. Will benefit from additional Physical Therapy to reach remaining goals. Goals: (to be met in 20 visits)   Pt will improve LEFS score from 35% to 65% to display improved ability to perform housework  Pt will reduce pain at its worst from 10/10 to 7/10 to allow for improved ability to stand to do dishes  Pt will dorsiflexion ROM to 10 degrees to allow for improved ability to ambulate MET  Pt will be independent with home program to allow for maintenance of goals achieved in therapy. (95% progress 2/14/2023)        Post LEFS Score  Post LEFS Score: 81.25 % (2/14/2023  7:20 PM)    46.25 % improvement    Plan: Continue skilled Physical Therapy 1-2 x/week or a total of 20 visits over a 90 day period. Treatment will include: manual therapy, therapeutic exercise, therapeutic activites, neuromuscular reeducation and home program       Patient/Family/Caregiver was advised of these findings, precautions, and treatment options and has agreed to actively participate in planning and for this course of care. Thank you for your referral. If you have any questions, please contact me at Dept: [phone]. Sincerely,  Electronically signed by therapist: Gissel Haywood PT     Physician's certification required:  Yes  Please co-sign or sign and return this letter via fax as soon as possible to [phone]. I certify the need for these services furnished under this plan of treatment and while under my care. X___________________________________________________ Date____________________    Certification From: 8/34/6186  To:5/15/2023           Additional ideas: Toe extension stretching with foot on wall/doorframe.  (Make sure no worse)    medial calcaneal glides (do these still help with mobilization?)  (DO this in standing now)    2nd toe: exhaust mobilization, and extension stretching. Add to home program likely:  Balance (narrow or tandem) (no worse 2/2/2023, but maybe worse 2nd set)     eversion strengthening with band    NMES toe flexion    Maybe:  Can she do plantar flexion into a band: worse on 2/2/2023        Charges: 2 there ex, 1 manual therapy    Total Timed Treatment: 38 min  Total Treatment Time: 38 min  Date: 2/2/2023  Tx#: 4 Date: 2/7/2023  Tx#: 5 Date: 2/9/2023  Tx#: 6 2/14/2023  Tx: #7      There ex:  Sustained on elbows 60', prone lumbar mobilization therapist mobilization and therapist overpressure L5, L3 and T12 x6 each level: no effect    Shift correction hips to the R x10: a bit better (pain now 4/10 with plantar flexion)    Prone press up to the R 3-10x6: somewhat better (pain now 3/10 with plantar flexion)    Prone leg raise hip extension x20    Side lying hip abduction x20    Tandem stance 30': no worse 2nd set maybe worse    Tandem stance with just toes in front 30'; after worse     Plantar flexion push down into yellow band x20: after worse    Inversion isometric x10    Plantar flexion isometric x20    Eversion isometric x5    Dorsiflexion with green or yellow band x20    Eversion stretching x40    Educated on home program, see below. Verbal, visual and tactile cues for there ex. There ex: Inversion isometric x20    Plantar flexion isometric x20    Eversion isometric x20    Dorsiflexion with red band x15    Eversion stretching x40    Self-calcaneal medial glide 5 minutes    Educated on home program, see below. Verbal, visual and tactile cues for there ex. There ex:  medial calcaneal glides 1-20x3 (2nd set sustained 2 minutes)    Band inversion blue 20x3    Toe object  2'    Toe towel curls 1-2 minutes x2    Self-2nd toe inferior/superior glides 5 minutes    Flexion of 2nd toe repeated x10 with therapaist overpressure: produce, no worse. Educated on home program, see below.   Verbal, visual and tactile cues for there ex. There ex:  slide foot under chair and dorsiflexion stretching 10x2: no worse      loaded dorsiflexion stretching x10: after worse    NMES with toe flexion 10 on 50 off, 10 minutes    Eversion stretching open chain x30    Medial calcaneal glides patient forces sitting 50'     Inversion yellow band against gravity x20    2nd toe extension stretching 15-20x4: better toe flexion (first 2 sets therapist forces)    Educated on home program, see below. Verbal, visual and tactile cues for there ex. Manual 8 minutes  supination stretching (more rotation) therapist forces x20: no effect    Soft tissue massage Manual 23 minutes  Calcaneal medial glides.   Manual 15 minutes:  medial calcaneal glides: better 1st set, no change 2nd set (mobilization sidelying, grade 4)    2nd toe mobilization superior/inferior: better, abolish pain with flexion    Soft tissue massage to foot Manual 10 minutes:  medial calcaneal glides: better 1st set

## 2023-02-16 ENCOUNTER — APPOINTMENT (OUTPATIENT)
Dept: PHYSICAL THERAPY | Age: 38
End: 2023-02-16
Attending: PODIATRIST
Payer: COMMERCIAL

## 2023-02-16 ENCOUNTER — TELEPHONE (OUTPATIENT)
Dept: PHYSICAL THERAPY | Facility: HOSPITAL | Age: 38
End: 2023-02-16

## 2023-02-21 ENCOUNTER — OFFICE VISIT (OUTPATIENT)
Dept: PHYSICAL THERAPY | Age: 38
End: 2023-02-21
Attending: PODIATRIST
Payer: COMMERCIAL

## 2023-02-21 PROCEDURE — 97110 THERAPEUTIC EXERCISES: CPT

## 2023-02-21 PROCEDURE — 97140 MANUAL THERAPY 1/> REGIONS: CPT

## 2023-02-22 NOTE — PROGRESS NOTES
Insurance (Authorized # of Visits):  [facility]         Diagnosis:   Left foot pain (W31.970)  Plantar fasciitis of left foot (M72.2)  Chronic heel pain, left (M79.672,G89.29)  Achilles tendinitis of left lower extremity (F35.63)          Referring Provider: Lana Carvajal   Date of Evaluation:    1/24/2023  Precautions:  None  Next MD visit:   none scheduled  Date of Surgery: n/a                Subjective: Pt describes pain level, at worst 5-6/10. Current functional limitations include ability to stand, ambulate and do housework. Overall about 60% improved since starting therapy. Can walk short distances with much less pain. However, still pain with prolonbed weight bearing. But improved since last session, less pain walking          Objective:   (* denotes performed with pain)  Tested 2/21/2023:  Strength/MMT: (* denotes performed with pain)    Foot/Ankle   PF: R 5/5; L 4-/5* (3/10 pain)            AROM: (* denotes performed with pain)  2nd toe left foot: min/nil flexion*, extension: min/nil* and palpation to 2nd MCP joint    Special Tests:  Loaded dorsiflexion with knee bent* (familiar pain) 6/10 pain    Tested 2/14/2023:  Strength/MMT: (* denotes performed with pain)  Toe flexion: 4-/5 L (2nd toe) and 4/5 R (tested 1/31/2023)    Tested 2/9/2023:  AROM: (* denotes performed with pain)  (R tested 2/3/2023)  Foot/Ankle   DF: R 10 soleus bias, 11 gastroc bias; L 10  soleus bias, 10 gastroc bias  Plantar flexion: 82 L and 85 R (left affected)            Palpation: swollen below toes of left foot    Tested 2/7/2023:  Palpation:   Pain at insertional achilles and proximallly       Tested 1/31/2023:  Stance: Flexible flatfoot upon stance and worse on the left with some mild ankle valgus. Tested 1/24/2023:    Special tests:   Girth figure 8 (50 cm L and 53 cm R)     Accessory motion: intact talocrucal joint, min loss calcaneal joint L (lateral*/medial* and anterior*/posterior*) and intact R calcaneal joint.       Assessment: Reduced pain after self-medial calcaneal glides standing, added to home program. Able to progress to dynamic eversion strengthening. Goals: (to be met in 20 visits)   Pt will improve LEFS score from 35% to 65% to display improved ability to perform housework  Pt will reduce pain at its worst from 10/10 to 7/10 to allow for improved ability to stand to do dishes  Pt will dorsiflexion ROM to 10 degrees to allow for improved ability to ambulate MET  Pt will be independent with home program to allow for maintenance of goals achieved in therapy. (95% progress 2/14/2023)          Plan:     Toe extension stretching with foot on wall/doorframe. (Make sure no worse)    Eversion with more of a twist...consider trying this    Add to home program likely:  Balance (narrow or tandem) (no worse 2/2/2023, but maybe worse 2nd set)    Toe  towel and give to other hand as stand on one leg (can she do this standing on other leg), do on both legs.     Maybe:  Can she do plantar flexion into a band: worse on 2/2/2023    Cut out toe extension stretching from home program.     NMES toe flexion        Charges: 2 there ex, 1 manual therapy    Total Timed Treatment: 38 min  Total Treatment Time: 38 min  Date: 2/2/2023  Tx#: 4 Date: 2/7/2023  Tx#: 5 Date: 2/9/2023  Tx#: 6 2/14/2023  Tx: #7 2/21/2023  Tx#: 8     There ex:  Sustained on elbows 60', prone lumbar mobilization therapist mobilization and therapist overpressure L5, L3 and T12 x6 each level: no effect    Shift correction hips to the R x10: a bit better (pain now 4/10 with plantar flexion)    Prone press up to the R 3-10x6: somewhat better (pain now 3/10 with plantar flexion)    Prone leg raise hip extension x20    Side lying hip abduction x20    Tandem stance 30': no worse 2nd set maybe worse    Tandem stance with just toes in front 30'; after worse     Plantar flexion push down into yellow band x20: after worse    Inversion isometric x10    Plantar flexion isometric x20    Eversion isometric x5    Dorsiflexion with green or yellow band x20    Eversion stretching x40    Educated on home program, see below. Verbal, visual and tactile cues for there ex. There ex: Inversion isometric x20    Plantar flexion isometric x20    Eversion isometric x20    Dorsiflexion with red band x15    Eversion stretching x40    Self-calcaneal medial glide 5 minutes    Educated on home program, see below. Verbal, visual and tactile cues for there ex. There ex:  medial calcaneal glides 1-20x3 (2nd set sustained 2 minutes)    Band inversion blue 20x3    Toe object  2'    Toe towel curls 1-2 minutes x2    Self-2nd toe inferior/superior glides 5 minutes    Flexion of 2nd toe repeated x10 with therapaist overpressure: produce, no worse. Educated on home program, see below. Verbal, visual and tactile cues for there ex. There ex:  slide foot under chair and dorsiflexion stretching 10x2: no worse      loaded dorsiflexion stretching x10: after worse    NMES with toe flexion 10 on 50 off, 10 minutes    Towel curls 60' for toes    Eversion stretching open chain x30    Medial calcaneal glides patient forces sitting 50'     Inversion yellow band against gravity x20    2nd toe extension stretching 15-20x4: better toe flexion (first 2 sets therapist forces)        Educated on home program, see below. Verbal, visual and tactile cues for there ex. There ex:  Medial calcaneal glides standing 25x2: better     eversion strengthening with band red 20x4    slide foot under chair and dorsiflexion stretching 10x2:    Eversion stretching open chain x30    Towel curls 60' for toes    NMES toe flexion with Towel curls 60' for toes 10 on/30 off    Educated on home program, see below. Verbal, visual and tactile cues for there ex. Manual 8 minutes  supination stretching (more rotation) therapist forces x20: no effect    Soft tissue massage Manual 23 minutes  Calcaneal medial glides.   Manual 15 minutes:  medial calcaneal glides: better 1st set, no change 2nd set (mobilization sidelying, grade 4)    2nd toe mobilization superior/inferior: better, abolish pain with flexion    Soft tissue massage to foot Manual 10 minutes:  medial calcaneal glides: better 1st set Manual 8 minutes:  medial calcaneal glides: no effect    2nd toe mobilization superior/inferior    2nd toe extension stretching: no effect

## 2023-02-28 ENCOUNTER — OFFICE VISIT (OUTPATIENT)
Dept: PHYSICAL THERAPY | Age: 38
End: 2023-02-28
Attending: PODIATRIST
Payer: COMMERCIAL

## 2023-02-28 PROCEDURE — 97110 THERAPEUTIC EXERCISES: CPT

## 2023-03-01 NOTE — PROGRESS NOTES
Insurance (Authorized # of Visits):  [facility]         Diagnosis:   Left foot pain (M41.048)  Plantar fasciitis of left foot (M72.2)  Chronic heel pain, left (M79.672,G89.29)  Achilles tendinitis of left lower extremity (C47.49)          Referring Provider: Jeyson Mims   Date of Evaluation:    1/24/2023  Precautions:  None  Next MD visit:   none scheduled  Date of Surgery: n/a                Subjective: Pt describes pain level, at worst 4/10. Current functional limitations include ability to stand, ambulate and do housework. Overall about 60%, perhaps more, improved since starting therapy. Can walk short distances with much less pain. However, still pain with prolonbed weight bearing, especially two days ago when was on her feet for about two groups of 2 hours. But improved since last session, less pain walking          Objective:   (* denotes performed with pain)  Tested 2/28/2023:    Special Tests:  Loaded dorsiflexion with knee bent* (familiar pain) 3-4/10 pain    Strength/MMT: (* denotes performed with pain)  Foot/Ankle   PF: R 5/5; L 5/5   Soleus bias: appears intact bilaterally              Tested 2/21/2023:  AROM: (* denotes performed with pain)  2nd toe left foot: min/nil flexion*, extension: min/nil* and palpation to 2nd MCP joint    Tested 2/14/2023:  Strength/MMT: (* denotes performed with pain)  Toe flexion: 4-/5 L (2nd toe) and 4/5 R (tested 1/31/2023)    Tested 2/9/2023:  AROM: (* denotes performed with pain)  (R tested 2/3/2023)  Foot/Ankle   DF: R 10 soleus bias, 11 gastroc bias; L 10  soleus bias, 10 gastroc bias  Plantar flexion: 82 L and 85 R (left affected)            Palpation: swollen below toes of left foot    Tested 2/7/2023:  Palpation:   Pain at insertional achilles and proximallly       Tested 1/31/2023:  Stance: Flexible flatfoot upon stance and worse on the left with some mild ankle valgus.      Tested 1/24/2023:    Special tests:   Girth figure 8 (50 cm L and 53 cm R)     Accessory motion: intact talocrucal joint, min loss calcaneal joint L (lateral*/medial* and anterior*/posterior*) and intact R calcaneal joint. Assessment: Able to progress to eversion stretching with more foot rotation which abolishes all baselines. Can also progress home program to tandem balance. Reports less pain, improving. Will benefit from home NMES device to further increase leg strength. Goals: (to be met in 20 visits)   Pt will improve LEFS score from 35% to 65% to display improved ability to perform housework  Pt will reduce pain at its worst from 10/10 to 7/10 to allow for improved ability to stand to do dishes  Pt will dorsiflexion ROM to 10 degrees to allow for improved ability to ambulate MET  Pt will be independent with home program to allow for maintenance of goals achieved in therapy. (95% progress 2/14/2023)          Plan:   Sustained hold calcaneal glide (vs. Dynamic, what she is doing)    Can she do plantar flexion into the floor? (With a band: worse on 2/2/2023)    Toe extension stretching with foot on wall/doorframe. (Make sure no worse)    Tape foot, does she think it made a difference on 2/28/2023? Blood flow restriction if she brought shorts for NMES  Vs. NMES toe flexion    Ultrasound      Future:  Balance (narrow or tandem) (no worse 2/2/2023, but maybe worse 2nd set)    Toe  towel and give to other hand as stand on one leg (can she do this standing on other leg), do on both legs.             Charges: 3 there ex,  Total Timed Treatment: 38 min  Total Treatment Time: 38 min  2/14/2023  Tx: #7 2/21/2023  Tx#: 8 2/28/2023  Tx: 9        There ex:  slide foot under chair and dorsiflexion stretching 10x2: no worse      loaded dorsiflexion stretching x10: after worse    NMES with toe flexion 10 on 50 off, 10 minutes    Towel curls 60' for toes    Eversion stretching open chain x30    Medial calcaneal glides patient forces sitting 50'     Inversion yellow band against gravity x20    2nd toe extension stretching 15-20x4: better toe flexion (first 2 sets therapist forces)        Educated on home program, see below. Verbal, visual and tactile cues for there ex. There ex:  Medial calcaneal glides standing 25x2: better     eversion strengthening with band red 20x4    slide foot under chair and dorsiflexion stretching 10x2:    Eversion stretching open chain x30     Towel curls 60' for toes    NMES toe flexion with Towel curls 60' for toes 10 on/30 off    Educated on home program, see below. Verbal, visual and tactile cues for there ex. There ex:  Eversion stretching open chain x5-20 (6 sets) with foot rotation: abolish pain in foot     Speed walk 30'-60'x2: after a bit worse    Balance: narrow base of support, tandem stance, single leg 30' x2 sets each: worse single leg, no worse two legs    NMES toe flexion with Towel curls 15 minutes for toes 10 on/50 off    Taped foot into medial calcaneal rotation and foot eversion, kinesotape 5 minutes     eversion strengthening with band blue 20x    Educated on home program, see below. Verbal, visual and tactile cues for there ex.             Manual 10 minutes:  medial calcaneal glides: better 1st set Manual 8 minutes:  medial calcaneal glides: no effect    2nd toe mobilization superior/inferior    2nd toe extension stretching: no effect

## 2023-03-02 DIAGNOSIS — G89.29 CHRONIC HEEL PAIN, LEFT: ICD-10-CM

## 2023-03-02 DIAGNOSIS — M72.2 PLANTAR FASCIITIS OF LEFT FOOT: Primary | ICD-10-CM

## 2023-03-02 DIAGNOSIS — M76.62 ACHILLES TENDINITIS OF LEFT LOWER EXTREMITY: ICD-10-CM

## 2023-03-02 DIAGNOSIS — M79.672 CHRONIC HEEL PAIN, LEFT: ICD-10-CM

## 2023-03-07 ENCOUNTER — OFFICE VISIT (OUTPATIENT)
Dept: PHYSICAL THERAPY | Age: 38
End: 2023-03-07
Attending: PODIATRIST
Payer: COMMERCIAL

## 2023-03-07 PROCEDURE — 97110 THERAPEUTIC EXERCISES: CPT

## 2023-03-07 PROCEDURE — 97035 APP MDLTY 1+ULTRASOUND EA 15: CPT

## 2023-03-08 NOTE — PROGRESS NOTES
Insurance (Authorized # of Visits):  [facility]         Diagnosis:   Left foot pain (S62.663)  Plantar fasciitis of left foot (M72.2)  Chronic heel pain, left (M79.672,G89.29)  Achilles tendinitis of left lower extremity (R49.85)          Referring Provider: Marcela Smith   Date of Evaluation:    1/24/2023  Precautions:  None  Next MD visit:   none scheduled  Date of Surgery: n/a                Subjective: Pt describes pain level, at worst 4/10. Current functional limitations include ability to stand, ambulate and do housework. Overall about 60%, perhaps more, improved since starting therapy. Can walk short distances with much less pain. However, still pain with prolonbed weight bearing. But improved since last session, less pain walking. Notices as she walked on treadmill at home pain reduced as she kept walking. Objective:   (* denotes performed with pain)  Tested 3/7/2023:    Special Tests:  Loaded dorsiflexion with knee bent* (familiar pain) 4/10 pain    Strength/MMT: (* denotes performed with pain)  Foot/Ankle   PF: R 5/5; L 5/5               Tested 2/21/2023:  AROM: (* denotes performed with pain)  2nd toe left foot: min/nil flexion*, extension: min/nil* and palpation to 2nd MCP joint    Tested 2/14/2023:  Strength/MMT: (* denotes performed with pain)  Toe flexion: 4-/5 L (2nd toe) and 4/5 R (tested 1/31/2023)    Tested 2/9/2023:  AROM: (* denotes performed with pain)  (R tested 2/3/2023)  Foot/Ankle   DF: R 10 soleus bias, 11 gastroc bias; L 10  soleus bias, 10 gastroc bias  Plantar flexion: 82 L and 85 R (left affected)            Palpation: swollen below toes of left foot    Tested 2/7/2023:  Palpation:   Pain at insertional achilles and proximallly       Tested 1/31/2023:  Stance: Flexible flatfoot upon stance and worse on the left with some mild ankle valgus.      Tested 1/24/2023:    Special tests:   Girth figure 8 (50 cm L and 53 cm R)     Accessory motion: intact talocrucal joint, min loss calcaneal joint L (lateral*/medial* and anterior*/posterior*) and intact R calcaneal joint. Assessment: Able to progress to eversion stretching with more foot rotation which abolishes all baselines. Can also progress home program to tandem balance. Reports less pain, improving. Will benefit from home NMES device to further increase leg strength. Goals: (to be met in 20 visits)   Pt will improve LEFS score from 35% to 65% to display improved ability to perform housework  Pt will reduce pain at its worst from 10/10 to 7/10 to allow for improved ability to stand to do dishes  Pt will dorsiflexion ROM to 10 degrees to allow for improved ability to ambulate MET  Pt will be independent with home program to allow for maintenance of goals achieved in therapy. (95% progress 2/14/2023)          Plan:   See calcaneal medial rotation standing with dorsiflexion stretching? See dorsiflexion stretching knee with knee bent and foot rotated on towels repeated? (Rotated out to the side on angle, like foot is on side of a hill)    Maybe soleus stretching repeated as a dysfunction? (Do this multiple times a day, make sure no worse, give clear instructions to stop if worse)      Can she do plantar flexion standing both hands on counter? See tandem balance with just toes in front of her? Speed walking can she do this and not be worse? Get feed back on ultrasound    Tape foot, (if better with \"kinesotape, maybe tape foot into eversion as well as medial calcaneal rotation, which were both done 2/28/2023)    (Get feed back on blood flow restriction)  Blood flow restriction if she brought shorts for NMES  Vs. NMES toe flexion      Future:  Balance (narrow or tandem) (no worse 2/2/2023, but maybe worse 2nd set)    Toe  towel and give to other hand as stand on one leg (can she do this standing on other leg), do on both legs.             Charges: 2 there ex, 1 ultrasound Total Timed Treatment: 38 min  Total Treatment Time: 38 min  2/14/2023  Tx: #7 2/21/2023  Tx#: 8 2/28/2023  Tx: 9 3/7/2023  Tx#: 10       There ex:  slide foot under chair and dorsiflexion stretching 10x2: no worse      loaded dorsiflexion stretching x10: after worse    NMES with toe flexion 10 on 50 off, 10 minutes    Towel curls 60' for toes    Eversion stretching open chain x30    Medial calcaneal glides patient forces sitting 50'     Inversion yellow band against gravity x20    2nd toe extension stretching 15-20x4: better toe flexion (first 2 sets therapist forces)        Educated on home program, see below. Verbal, visual and tactile cues for there ex. There ex:  Medial calcaneal glides standing 25x2: better     eversion strengthening with band red 20x4    slide foot under chair and dorsiflexion stretching 10x2:    Eversion stretching open chain x30     Towel curls 60' for toes    NMES toe flexion with Towel curls 60' for toes 10 on/30 off    Educated on home program, see below. Verbal, visual and tactile cues for there ex. There ex:  Eversion stretching open chain x5-20 (6 sets) with foot rotation: abolish pain in foot     Speed walk 30'-60'x2: after a bit worse    Balance: narrow base of support, tandem stance, single leg 30' x2 sets each: worse single leg, no worse two legs    NMES toe flexion with Towel curls 15 minutes for toes 10 on/50 off    Taped foot into medial calcaneal rotation and foot eversion, kinesotape 5 minutes     eversion strengthening with band blue 20x    Educated on home program, see below. Verbal, visual and tactile cues for there ex. There ex:  Eversion modified stretching open chain x20 (2 sets, 2nd set with eversion rotation and first set with more inversion bias, first set how patient was doing home program, which helps a bit.  No effect 2nd set)    Dynamic calcaneal glide x25: no effect (doing sustained at home)      Balance:  tandem stance, single leg 30' x2     Blood flow restruction toe flexion into pen 8 minutes for toes Taped foot into medial calcaneal rotation and foot eversion, luketope 5 minutes    Plantar flexion strengthening into yellow band x10    Toe extension stretching for plantar fascia sitting x20 and standing x10    Educated on home program, see below. Verbal, visual and tactile cues for there ex.          Manual 10 minutes:  medial calcaneal glides: better 1st set Manual 8 minutes:  medial calcaneal glides: no effect    2nd toe mobilization superior/inferior    2nd toe extension stretching: no effect            Ultrasound 8 minutes:  1.0 W/m2, to Peggy, 50% duty cycle

## 2023-03-09 ENCOUNTER — APPOINTMENT (OUTPATIENT)
Dept: PHYSICAL THERAPY | Age: 38
End: 2023-03-09
Attending: PODIATRIST
Payer: COMMERCIAL

## 2023-03-14 ENCOUNTER — OFFICE VISIT (OUTPATIENT)
Dept: NEUROLOGY | Facility: CLINIC | Age: 38
End: 2023-03-14
Payer: COMMERCIAL

## 2023-03-14 ENCOUNTER — OFFICE VISIT (OUTPATIENT)
Dept: OTOLARYNGOLOGY | Facility: CLINIC | Age: 38
End: 2023-03-14

## 2023-03-14 ENCOUNTER — OFFICE VISIT (OUTPATIENT)
Dept: PHYSICAL THERAPY | Age: 38
End: 2023-03-14
Attending: PODIATRIST
Payer: COMMERCIAL

## 2023-03-14 VITALS — WEIGHT: 225 LBS | BODY MASS INDEX: 34.1 KG/M2 | TEMPERATURE: 98 F | HEIGHT: 68 IN

## 2023-03-14 DIAGNOSIS — D16.9 OSSIFYING FIBROMA: ICD-10-CM

## 2023-03-14 DIAGNOSIS — G43.711 CHRONIC MIGRAINE WITHOUT AURA, INTRACTABLE, WITH STATUS MIGRAINOSUS: Primary | ICD-10-CM

## 2023-03-14 DIAGNOSIS — J34.3 NASAL TURBINATE HYPERTROPHY: ICD-10-CM

## 2023-03-14 DIAGNOSIS — J01.90 ACUTE BACTERIAL SINUSITIS: Primary | ICD-10-CM

## 2023-03-14 DIAGNOSIS — B96.89 ACUTE BACTERIAL SINUSITIS: Primary | ICD-10-CM

## 2023-03-14 PROCEDURE — 99203 OFFICE O/P NEW LOW 30 MIN: CPT | Performed by: SPECIALIST

## 2023-03-14 PROCEDURE — 97110 THERAPEUTIC EXERCISES: CPT

## 2023-03-14 PROCEDURE — 3008F BODY MASS INDEX DOCD: CPT | Performed by: SPECIALIST

## 2023-03-14 RX ORDER — PREDNISONE 20 MG/1
20 TABLET ORAL DAILY
Qty: 3 TABLET | Refills: 0 | Status: SHIPPED | OUTPATIENT
Start: 2023-03-14

## 2023-03-14 RX ORDER — CEFDINIR 300 MG/1
300 CAPSULE ORAL 2 TIMES DAILY
Qty: 28 CAPSULE | Refills: 0 | Status: SHIPPED | OUTPATIENT
Start: 2023-03-14

## 2023-03-14 NOTE — PROCEDURES
PROCEDURE: Botox Injections (MIGRAINE PROTOCOL)   PRE-OPERATIVE DIAGNOSIS: Chronic Migraine  POST-OPERATIVE DIAGNOSIS: same as above   BLOOD LOSS: minimal COMPLICATIONS: none     DESCRIPTION OF PROCEDURE: After a discussion of the risks and benefits, the patient consented to the procedure. The patient was taken to the procedure room. The upper back, neck, and occipital area was prepped with alcohol swabs. The 2 Botox vials containing 100 units each, were reconstituted with saline. There are 31 distinct targets in the standard protocol. Following muscles and units were injected:     10 units divided between 2 sites. Procerus 5 units in 1 site. Frontalis 20 units divided between 4 sites. Temporalis 40 units divided between 8 sites. Occipitalis 40 units divided between 8 sites. Cervical paraspinal 20 units divided between 4 sites. Trapezius 30 units divided between 6 sites. Patient tolerated the procedure well. Total of 165 Units of botulinum toxin were used and 35 Units were wasted. Freddie Alonso might have provided some extra benefit but unfortunately it was not affordable and therefore she stopped.

## 2023-03-14 NOTE — PATIENT INSTRUCTIONS
You are placed on a trial of prednisone and Omnicef for your sinusitis. Follow-up in 3 weeks time, sooner if problems. If you have continued symptoms, a fiberoptic exam will be recommended at that point in time.

## 2023-03-14 NOTE — PROGRESS NOTES
Insurance (Authorized # of Visits):  [facility]         Diagnosis:   Left foot pain (L66.377)  Plantar fasciitis of left foot (M72.2)  Chronic heel pain, left (M79.672,G89.29)  Achilles tendinitis of left lower extremity (Y57.64)          Referring Provider: Sean Samayoa   Date of Evaluation:    1/24/2023  Precautions:  None  Next MD visit:   none scheduled  Date of Surgery: n/a                Subjective: Pt describes pain level, at worst 4/10. Current functional limitations include ability to stand, ambulate and do housework. Overall about 60%, perhaps more, improved since starting therapy. Can walk short distances with much less pain. However, still pain with prolonbed weight bearing. But improved since last session, less pain walking. Notices as she walked on treadmill at home pain reduced as she kept walking. Doing about 30% of home program, has been sick some since last visit. Objective:   (* denotes performed with pain)  Tested 3/14/2023:    Special Tests:  Loaded dorsiflexion with knee bent* (familiar pain) 4/10 pain    Strength/MMT: (* denotes performed with pain)  Foot/Ankle   PF: R 5/5; L 5/5               Tested 2/21/2023:  AROM: (* denotes performed with pain)  2nd toe left foot: min/nil flexion*, extension: min/nil* and palpation to 2nd MCP joint    Tested 2/14/2023:  Strength/MMT: (* denotes performed with pain)  Toe flexion: 4-/5 L (2nd toe) and 4/5 R (tested 1/31/2023)    Tested 2/9/2023:  AROM: (* denotes performed with pain)  (R tested 2/3/2023)  Foot/Ankle   DF: R 10 soleus bias, 11 gastroc bias; L 10  soleus bias, 10 gastroc bias  Plantar flexion: 82 L and 85 R (left affected)            Palpation: swollen below toes of left foot    Tested 2/7/2023:  Palpation:   Pain at insertional achilles and proximallly       Tested 1/31/2023:  Stance: Flexible flatfoot upon stance and worse on the left with some mild ankle valgus.      Tested 1/24/2023:    Special tests:   Girth figure 8 (50 cm L and 53 cm R)     Accessory motion: intact talocrucal joint, min loss calcaneal joint L (lateral*/medial* and anterior*/posterior*) and intact R calcaneal joint. Assessment: Able to progress to dorsiflexion stretching with knee bent as well as standing calf raises. Can also progress home program to tandem balance with just toes down on R leg. Anticipate discharge in three visits. Goals: (to be met in 20 visits)   Pt will improve LEFS score from 35% to 65% to display improved ability to perform housework  Pt will reduce pain at its worst from 10/10 to 7/10 to allow for improved ability to stand to do dishes  Pt will dorsiflexion ROM to 10 degrees to allow for improved ability to ambulate MET  Pt will be independent with home program to allow for maintenance of goals achieved in therapy. (95% progress 2/14/2023)          Plan:   See single leg balance    See Plantar flexion hands on wall    Review her tape foot, (if better with \"kinesotape, maybe tape foot into eversion as well as medial calcaneal rotation, which were both done 2/28/2023)    NMES toe flexion      Future:  Toe  towel and give to other hand as stand on one leg (can she do this standing on other leg), do on both legs. Charges: 3 there ex,  Total Timed Treatment: 38 min  Total Treatment Time: 38 min  2/28/2023  Tx: 9 3/7/2023  Tx#: 10 3/14/2023  Tx#: 11      There ex:  Eversion stretching open chain x5-20 (6 sets) with foot rotation: abolish pain in foot     Speed walk 30'-60'x2: after a bit worse    Balance: narrow base of support, tandem stance, single leg 30' x2 sets each: worse single leg, no worse two legs    NMES toe flexion with Towel curls 15 minutes for toes 10 on/50 off    Taped foot into medial calcaneal rotation and foot eversion, kinesotape 5 minutes     eversion strengthening with band blue 20x    Educated on home program, see below. Verbal, visual and tactile cues for there ex.      There ex:  Eversion modified stretching open chain x20 (2 sets, 2nd set with eversion rotation and first set with more inversion bias, first set how patient was doing home program, which helps a bit. No effect 2nd set)    Dynamic calcaneal glide x25: no effect (doing sustained at home)      Balance:  tandem stance, single leg 30' x2     Blood flow restruction toe flexion into pen 8 minutes for toes     Taped foot into medial calcaneal rotation and foot eversion, luketope 5 minutes    Plantar flexion strengthening into yellow band x10    Toe extension stretching for plantar fascia sitting x20 and standing x10    Educated on home program, see below. Verbal, visual and tactile cues for there ex. There ex:  Dorsiflexion stretching with medial calcaneal glide x10: no better    Dorsiflexion stretching knee with knee bent and foot rotated on towels repeated (eversion with dorsiflexion) x10: after worse, now stair descent hurts    Soleus stretching x5-10x4: better    plantar flexion standing both hands on counter x20    NMES toe flexion with Towel curls 8 minutes for toes 10 on/50 off    Taped foot into medial calcaneal rotation and foot eversion, kineotape, taught technique to eliseo 5 minutes    Toe extension stretching for plantar fascia standing x10    Tandem stance 30' and toes in front 30'    Walk fast 60'    Educated on home program, see below. Verbal, visual and tactile cues for there ex.                  Ultrasound 8 minutes:  1.0 W/m2, to Peggy, 50% duty cycle

## 2023-03-16 ENCOUNTER — APPOINTMENT (OUTPATIENT)
Dept: PHYSICAL THERAPY | Age: 38
End: 2023-03-16
Attending: PODIATRIST
Payer: COMMERCIAL

## 2023-03-21 ENCOUNTER — OFFICE VISIT (OUTPATIENT)
Dept: PHYSICAL THERAPY | Age: 38
End: 2023-03-21
Attending: PODIATRIST
Payer: COMMERCIAL

## 2023-03-21 PROCEDURE — 97110 THERAPEUTIC EXERCISES: CPT

## 2023-03-21 PROCEDURE — 97140 MANUAL THERAPY 1/> REGIONS: CPT

## 2023-03-21 NOTE — PROGRESS NOTES
Insurance (Authorized # of Visits):  [facility]         Diagnosis:   Left foot pain (Q94.946)  Plantar fasciitis of left foot (M72.2)  Chronic heel pain, left (M79.672,G89.29)  Achilles tendinitis of left lower extremity (R56.45)          Referring Provider: Delphine Cruz   Date of Evaluation:    1/24/2023  Precautions:  None  Next MD visit:   none scheduled  Date of Surgery: n/a                Subjective: Pt describes pain level, at worst 4/10. Current functional limitations include ability to stand, ambulate and do housework. Overall about 60%, perhaps more, improved since starting therapy. Can walk short distances with much less pain. However, still pain with prolonbed weight bearing. But improved since last session, less pain walking. Notices as she walked on treadmill at home pain reduced as she kept walking. Some days foot feels really good, other days stiff/painful with stair descent. Doing about 30% of home program, has been sick some since last visit. Objective:   (* denotes performed with pain)  Tested 3/21/2023:    Special Tests:  Loaded dorsiflexion with knee bent* (familiar pain) 5-6/10 pain    Strength/MMT: (* denotes performed with pain)  Toe flexion: 4+/5 L (2nd toe) and 4/5 R (tested 1/31/2023)    Palpation:   Pain at insertional achilles and proximallly     Tested 2/21/2023:  AROM: (* denotes performed with pain)  2nd toe left foot: min/nil flexion*, extension: min/nil* and palpation to 2nd MCP joint      Tested 1/31/2023:  Stance: Flexible flatfoot upon stance and worse on the left with some mild ankle valgus. Tested 1/24/2023:  Special tests:   Girth figure 8 (50 cm L and 53 cm R)      Assessment: Pain in Achilles appears abolished after soft tissue massage.  Able to progress home program for strength and balance        Goals: (to be met in 20 visits)   Pt will improve LEFS score from 35% to 65% to display improved ability to perform housework  Pt will reduce pain at its worst from 10/10 to 7/10 to allow for improved ability to stand to do dishes  Pt will dorsiflexion ROM to 10 degrees to allow for improved ability to ambulate MET  Pt will be independent with home program to allow for maintenance of goals achieved in therapy. (95% progress 2/14/2023)        Plan:   See if self-soft tissue massage, with lotion, reduces her pain (ask her, has she tried this, how long do the massage for?)    Soft tissue massage to Achillies    tape foot,     Toe  towel and give to other hand as stand on one leg (can she do this standing on other leg), do on both legs. Progress reps of plantar flexion 25 times    Progress single leg balance to 30 seconds, maybe a minute            Charges: 1 manual therapy, 2 there ex,  Total Timed Treatment: 38 min  Total Treatment Time: 38 min  2/28/2023  Tx: 9 3/7/2023  Tx#: 10 3/14/2023  Tx#: 11 3/21/2023  Tx#: 12     There ex:  Eversion stretching open chain x5-20 (6 sets) with foot rotation: abolish pain in foot     Speed walk 30'-60'x2: after a bit worse    Balance: narrow base of support, tandem stance, single leg 30' x2 sets each: worse single leg, no worse two legs    NMES toe flexion with Towel curls 15 minutes for toes 10 on/50 off    Taped foot into medial calcaneal rotation and foot eversion, kinesotape 5 minutes     eversion strengthening with band blue 20x    Educated on home program, see below. Verbal, visual and tactile cues for there ex. There ex:  Eversion modified stretching open chain x20 (2 sets, 2nd set with eversion rotation and first set with more inversion bias, first set how patient was doing home program, which helps a bit.  No effect 2nd set)    Dynamic calcaneal glide x25: no effect (doing sustained at home)      Balance:  tandem stance, single leg 30' x2     Blood flow restruction toe flexion into pen 8 minutes for toes     Taped foot into medial calcaneal rotation and foot eversion, luketope 5 minutes    Plantar flexion strengthening into yellow band x10    Toe extension stretching for plantar fascia sitting x20 and standing x10    Educated on home program, see below. Verbal, visual and tactile cues for there ex. There ex:  Dorsiflexion stretching with medial calcaneal glide x10: no better    Dorsiflexion stretching knee with knee bent and foot rotated on towels repeated (eversion with dorsiflexion) x10: after worse, now stair descent hurts    Soleus stretching x5-10x4: better    plantar flexion standing both hands on counter x20    NMES toe flexion with Towel curls 8 minutes for toes 10 on/50 off    Taped foot into medial calcaneal rotation and foot eversion, kineotape, taught technique to patinet 5 minutes    Toe extension stretching for plantar fascia standing x10    Tandem stance 30' and toes in front 30'    Walk fast 60'    Educated on home program, see below. Verbal, visual and tactile cues for there ex. There ex 23 minutes:  Soleus stretching 10x2    Single leg plantar flexion 10x2    Single leg balance 10'x2    Reviewed home program.    Taught how to use home NMES unit 6 minutes    NMES toe flexion with Towel curls 8 minutes for toes 10 on/50 off    Told do self-massage to Achilles, patient agrees. Educated on home program, see below. Verbal, visual and tactile cues for there ex.           Manual therapy 15 minutes:  Soft tissue massage to Achilles     Taped foot into medial calcaneal rotation and foot eversion, kineotape      Ultrasound 8 minutes:  1.0 W/m2, to Peggy, 50% duty cycle

## 2023-03-28 ENCOUNTER — OFFICE VISIT (OUTPATIENT)
Dept: PHYSICAL THERAPY | Age: 38
End: 2023-03-28
Attending: PODIATRIST
Payer: COMMERCIAL

## 2023-03-28 PROCEDURE — 97110 THERAPEUTIC EXERCISES: CPT

## 2023-03-28 PROCEDURE — 97140 MANUAL THERAPY 1/> REGIONS: CPT

## 2023-03-28 NOTE — PROGRESS NOTES
Insurance (Authorized # of Visits):  [facility]         Diagnosis:   Left foot pain (Y64.028)  Plantar fasciitis of left foot (M72.2)  Chronic heel pain, left (M79.672,G89.29)  Achilles tendinitis of left lower extremity (X47.23)          Referring Provider: Erik Rosas   Date of Evaluation:    1/24/2023  Precautions:  None  Next MD visit:   none scheduled  Date of Surgery: n/a                Subjective: Pt describes pain level, at worst 4/10. Current functional limitations include ability to stand, ambulate and do housework. Overall about 60%, perhaps more, improved since starting therapy. Can walk short distances with much less pain. However, still pain with prolonbed weight bearing. But improved since last session, less pain walking. Notices as she walked on treadmill at home pain reduced as she kept walking. Some days foot feels really good, other days stiff/painful with stair descent or walking    Doing home program,          Objective:   (* denotes performed with pain)  Tested 3/28/2023:    Special Tests:  Loaded dorsiflexion with knee bent* 5-6/10 pain    Palpation:   Pain at insertional achilles and proximally*    Intact:  step down 14\" step as baseline    Tested 3/21/2023:  Strength/MMT: (* denotes performed with pain)  Toe flexion: 4+/5 L (2nd toe) and 4/5 R (tested 1/31/2023)      Tested 2/21/2023:  AROM: (* denotes performed with pain)  2nd toe left foot: min/nil flexion*, extension: min/nil* and palpation to 2nd MCP joint    Tested 1/31/2023:  Stance: Flexible flatfoot upon stance and worse on the left with some mild ankle valgus. Tested 1/24/2023:  Special tests:   Girth figure 8 (50 cm L and 53 cm R)      Assessment: Pain reduced significantly after manual therapy. Home program progressed for standing there ex.          Goals: (to be met in 20 visits)   Pt will improve LEFS score from 35% to 65% to display improved ability to perform housework  Pt will reduce pain at its worst from 10/10 to 7/10 to allow for improved ability to stand to do dishes  Pt will dorsiflexion ROM to 10 degrees to allow for improved ability to ambulate MET  Pt will be independent with home program to allow for maintenance of goals achieved in therapy. (95% progress 2/14/2023)        Plan:   Have her massage Achillies with lotion, try in clinic vs. Without, see if maybe this will help    Ask on the effect of sleeping with foot in dorsiflexion (maybe she buy something like this)    Discharge next visit    Tristan massage to Achillies (lateral Achillies more than medial)    tape foot,         Charges: 1 manual therapy, 2 there ex,  Total Timed Treatment: 38 min  Total Treatment Time: 38 min  2/28/2023  Tx: 9 3/7/2023  Tx#: 10 3/14/2023  Tx#: 11 3/21/2023  Tx#: 12 3/28/2023  Tx#: 13    There ex:  Eversion stretching open chain x5-20 (6 sets) with foot rotation: abolish pain in foot     Speed walk 30'-60'x2: after a bit worse    Balance: narrow base of support, tandem stance, single leg 30' x2 sets each: worse single leg, no worse two legs    NMES toe flexion with Towel curls 15 minutes for toes 10 on/50 off    Taped foot into medial calcaneal rotation and foot eversion, kinesotape 5 minutes     eversion strengthening with band blue 20x    Educated on home program, see below. Verbal, visual and tactile cues for there ex. There ex:  Eversion modified stretching open chain x20 (2 sets, 2nd set with eversion rotation and first set with more inversion bias, first set how patient was doing home program, which helps a bit.  No effect 2nd set)    Dynamic calcaneal glide x25: no effect (doing sustained at home)      Balance:  tandem stance, single leg 30' x2     Blood flow restruction toe flexion into pen 8 minutes for toes     Taped foot into medial calcaneal rotation and foot eversion, luketope 5 minutes    Plantar flexion strengthening into yellow band x10    Toe extension stretching for plantar fascia sitting x20 and standing x10    Educated on home program, see below. Verbal, visual and tactile cues for there ex. There ex:  Dorsiflexion stretching with medial calcaneal glide x10: no better    Dorsiflexion stretching knee with knee bent and foot rotated on towels repeated (eversion with dorsiflexion) x10: after worse, now stair descent hurts    Soleus stretching x5-10x4: better    plantar flexion standing both hands on counter x20    NMES toe flexion with Towel curls 8 minutes for toes 10 on/50 off    Taped foot into medial calcaneal rotation and foot eversion, kineotape, taught technique to patinet 5 minutes    Toe extension stretching for plantar fascia standing x10    Tandem stance 30' and toes in front 30'    Walk fast 60'    Educated on home program, see below. Verbal, visual and tactile cues for there ex. There ex 23 minutes:  Soleus stretching 10x2    Single leg plantar flexion 10x2    Single leg balance 10'x2    Reviewed home program.    Taught how to use home NMES unit 6 minutes    NMES toe flexion with Towel curls 8 minutes for toes 10 on/50 off    Told do self-massage to Achilles, patient agrees. Educated on home program, see below. Verbal, visual and tactile cues for there ex. There ex 23 minutes: Toe  towel and give to other hand as stand on one leg, do on both legs 6x2    Plantar flexion 25 timesx2    Single leg balance to 30 seconds x2    Tibialis posterior strengthening sitting leg crossed blue band x25    Toe  objects 2 minutes    Fast walk 2 minutes    Soleus stretching 20x and held last rep 30'    Taught to sleep with foot in dorsiflexion with sock       Educated on home program, see below. Verbal, visual and tactile cues for there ex.            Manual therapy 15 minutes:  Soft tissue massage to Achilles     Taped foot into medial calcaneal rotation and foot eversion, kineotape Manual therapy 15 minutes:  Soft tissue massage to Achilles and graston    Taped foot into medial calcaneal rotation and foot erin peter     Ultrasound 8 minutes:  1.0 W/m2, to Peggy, 50% duty cycle

## 2023-04-04 ENCOUNTER — OFFICE VISIT (OUTPATIENT)
Dept: OTOLARYNGOLOGY | Facility: CLINIC | Age: 38
End: 2023-04-04

## 2023-04-04 DIAGNOSIS — J01.90 ACUTE BACTERIAL SINUSITIS: Primary | ICD-10-CM

## 2023-04-04 DIAGNOSIS — B96.89 ACUTE BACTERIAL SINUSITIS: Primary | ICD-10-CM

## 2023-04-04 DIAGNOSIS — J34.89 SINUS PRESSURE: ICD-10-CM

## 2023-04-04 DIAGNOSIS — J34.3 NASAL TURBINATE HYPERTROPHY: ICD-10-CM

## 2023-04-04 PROCEDURE — 99213 OFFICE O/P EST LOW 20 MIN: CPT | Performed by: SPECIALIST

## 2023-04-04 RX ORDER — PREDNISONE 20 MG/1
20 TABLET ORAL DAILY
Qty: 3 TABLET | Refills: 0 | Status: SHIPPED | OUTPATIENT
Start: 2023-04-04

## 2023-04-04 RX ORDER — AZELASTINE 1 MG/ML
2 SPRAY, METERED NASAL 2 TIMES DAILY
Qty: 30 ML | Refills: 5 | Status: SHIPPED | OUTPATIENT
Start: 2023-04-04

## 2023-04-04 RX ORDER — DOXYCYCLINE HYCLATE 100 MG/1
100 CAPSULE ORAL 2 TIMES DAILY
Qty: 28 CAPSULE | Refills: 0 | Status: SHIPPED | OUTPATIENT
Start: 2023-04-04

## 2023-04-04 NOTE — PATIENT INSTRUCTIONS
I placed on doxycycline, Astelin nasal spray, and prednisone for persistent sinus pressure. Follow-up in 3 weeks time, sooner if problems.   A CT scan or a fiberoptic scope will be considered at that point in time if symptoms persist.

## 2023-04-10 ENCOUNTER — TELEPHONE (OUTPATIENT)
Dept: PHYSICAL THERAPY | Facility: HOSPITAL | Age: 38
End: 2023-04-10

## 2023-04-11 ENCOUNTER — APPOINTMENT (OUTPATIENT)
Dept: PHYSICAL THERAPY | Age: 38
End: 2023-04-11
Attending: PODIATRIST
Payer: COMMERCIAL

## 2023-04-29 ENCOUNTER — OFFICE VISIT (OUTPATIENT)
Dept: OTOLARYNGOLOGY | Facility: CLINIC | Age: 38
End: 2023-04-29

## 2023-04-29 DIAGNOSIS — J34.3 NASAL TURBINATE HYPERTROPHY: ICD-10-CM

## 2023-04-29 DIAGNOSIS — D16.9 OSSIFYING FIBROMA: ICD-10-CM

## 2023-04-29 DIAGNOSIS — J32.9 CHRONIC SINUSITIS, UNSPECIFIED LOCATION: Primary | ICD-10-CM

## 2023-04-29 PROCEDURE — 99213 OFFICE O/P EST LOW 20 MIN: CPT | Performed by: SPECIALIST

## 2023-04-29 NOTE — PATIENT INSTRUCTIONS
As you have persisting sinus symptoms despite being on the steroid as well as cefdinir and doxycycline, a CT of the sinuses was ordered. I will of course notify you of the results. Continue the Flonase for your nasal congestion.

## 2023-05-05 ENCOUNTER — HOSPITAL ENCOUNTER (OUTPATIENT)
Dept: CT IMAGING | Age: 38
Discharge: HOME | End: 2023-05-05
Attending: SPECIALIST
Payer: COMMERCIAL

## 2023-05-05 DIAGNOSIS — J32.9 CHRONIC SINUSITIS, UNSPECIFIED LOCATION: ICD-10-CM

## 2023-05-05 PROCEDURE — 70486 CT MAXILLOFACIAL W/O DYE: CPT | Performed by: SPECIALIST

## 2023-05-06 NOTE — PROGRESS NOTES
Very small amount of thickening in the left maxillary sinus and a couple of retention cysts. Patient can follow up to review films.

## 2023-05-09 ENCOUNTER — TELEPHONE (OUTPATIENT)
Dept: OTOLARYNGOLOGY | Facility: CLINIC | Age: 38
End: 2023-05-09

## 2023-05-09 NOTE — TELEPHONE ENCOUNTER
Regarding: CT Results   Contact: [phone]  ----- Message from Dorothy Ratliff RN sent at 5/9/2023  3:38 PM CDT -----       ----- Message from Donna Gonzalez to Sharyn Alvarado MD sent at 5/8/2023  9:18 AM -----   Good morning,     I received your voicemail about the CT results. Are there any other options to reduce the congestion? Do I need to take more antibiotics or change the nasal spray?      Thank you,  Yair Flair

## 2023-06-20 ENCOUNTER — OFFICE VISIT (OUTPATIENT)
Dept: NEUROLOGY | Facility: CLINIC | Age: 38
End: 2023-06-20
Payer: COMMERCIAL

## 2023-06-20 DIAGNOSIS — G43.711 CHRONIC MIGRAINE WITHOUT AURA, INTRACTABLE, WITH STATUS MIGRAINOSUS: Primary | ICD-10-CM

## 2023-06-20 PROCEDURE — 64615 CHEMODENERV MUSC MIGRAINE: CPT | Performed by: OTHER

## 2023-06-20 NOTE — PROGRESS NOTES
PROCEDURE: Botox Injections (MIGRAINE PROTOCOL)   PRE-OPERATIVE DIAGNOSIS: Chronic Migraine  POST-OPERATIVE DIAGNOSIS: same as above   BLOOD LOSS: minimal COMPLICATIONS: none     DESCRIPTION OF PROCEDURE: After a discussion of the risks and benefits, the patient consented to the procedure. The patient was taken to the procedure room. The upper back, neck, and occipital area was prepped with alcohol swabs. The 2 Botox vials containing 100 units each, were reconstituted with saline. There are 31 distinct targets in the standard protocol. Following muscles and units were injected:     10 units divided between 2 sites. Procerus 5 units in 1 site. Frontalis 20 units divided between 4 sites. Temporalis 40 units divided between 8 sites. Occipitalis 40 units divided between 8 sites. Cervical paraspinal 20 units divided between 4 sites. Trapezius 30 units divided between 6 sites. Patient tolerated the procedure well. Total of 165 Units of botulinum toxin were used and 35 Units were wasted. Kenny End might have provided some extra benefit but unfortunately it was not affordable and therefore she stopped.

## 2023-06-20 NOTE — PROGRESS NOTES
Paperwork noting that patient may bear financial responsibility for procedure(s) performed in clinic today signed prior to proceeding with procedure(s). Furthermore, patient notified that they should contact their insurer to verify that your procedure/test has been approved and is a COVERED benefit. Although the [facility] staff does its due diligence, the insurance carrier gives the disclaimer that \"Although the procedure is authorized, this does not guarantee payment. \"    Ultimately the patient is responsible for payment. Botox is:  [x] Buy and Bill  [] Patient Supplied      Botox Reauthorization Questions:  1. Has the patient experienced a reduction in frequency of migraines since starting Botox? yes  a. If yes, by what percentage? 75%  2. Has the intensity of migraines decreased since starting Botox? yes  a. If yes, by what percentage?  50%

## 2023-09-01 ENCOUNTER — OFFICE VISIT (OUTPATIENT)
Dept: INTERNAL MEDICINE CLINIC | Facility: CLINIC | Age: 38
End: 2023-09-01
Payer: COMMERCIAL

## 2023-09-01 ENCOUNTER — LAB ENCOUNTER (OUTPATIENT)
Dept: LAB | Age: 38
End: 2023-09-01
Attending: NURSE PRACTITIONER
Payer: COMMERCIAL

## 2023-09-01 VITALS
RESPIRATION RATE: 20 BRPM | OXYGEN SATURATION: 99 % | TEMPERATURE: 98 F | SYSTOLIC BLOOD PRESSURE: 130 MMHG | HEIGHT: 68.11 IN | DIASTOLIC BLOOD PRESSURE: 80 MMHG | HEART RATE: 65 BPM | BODY MASS INDEX: 42.13 KG/M2 | WEIGHT: 278 LBS

## 2023-09-01 DIAGNOSIS — E66.01 MORBIDLY OBESE (HCC): ICD-10-CM

## 2023-09-01 DIAGNOSIS — Z01.818 PRE-OP EXAM: Primary | ICD-10-CM

## 2023-09-01 PROBLEM — M65.321 TRIGGER INDEX FINGER OF RIGHT HAND: Status: RESOLVED | Noted: 2018-10-05 | Resolved: 2023-09-01

## 2023-09-01 PROBLEM — G89.29 CHRONIC HEEL PAIN, LEFT: Status: RESOLVED | Noted: 2022-12-29 | Resolved: 2023-09-01

## 2023-09-01 PROBLEM — B02.9 HERPES ZOSTER WITHOUT COMPLICATION: Status: RESOLVED | Noted: 2020-01-08 | Resolved: 2023-09-01

## 2023-09-01 PROBLEM — G43.119 INTRACTABLE MIGRAINE WITH AURA WITHOUT STATUS MIGRAINOSUS: Status: RESOLVED | Noted: 2019-03-05 | Resolved: 2023-09-01

## 2023-09-01 PROBLEM — M65.331 TRIGGER FINGER, RIGHT MIDDLE FINGER: Status: RESOLVED | Noted: 2018-12-11 | Resolved: 2023-09-01

## 2023-09-01 PROBLEM — M79.672 CHRONIC HEEL PAIN, LEFT: Status: RESOLVED | Noted: 2022-12-29 | Resolved: 2023-09-01

## 2023-09-01 PROBLEM — M76.62 ACHILLES TENDINITIS OF LEFT LOWER EXTREMITY: Status: RESOLVED | Noted: 2022-12-29 | Resolved: 2023-09-01

## 2023-09-01 PROBLEM — M72.2 PLANTAR FASCIITIS OF LEFT FOOT: Status: RESOLVED | Noted: 2022-12-29 | Resolved: 2023-09-01

## 2023-09-01 PROBLEM — M65.311 TRIGGER FINGER OF RIGHT THUMB: Status: RESOLVED | Noted: 2018-12-11 | Resolved: 2023-09-01

## 2023-09-01 LAB
ALBUMIN SERPL-MCNC: 3.6 G/DL (ref 3.4–5)
ALBUMIN/GLOB SERPL: 0.9 {RATIO} (ref 1–2)
ALP LIVER SERPL-CCNC: 71 U/L
ALT SERPL-CCNC: 18 U/L
ANION GAP SERPL CALC-SCNC: 5 MMOL/L (ref 0–18)
AST SERPL-CCNC: 18 U/L (ref 15–37)
ATRIAL RATE: 65 BPM
BASOPHILS # BLD AUTO: 0.01 X10(3) UL (ref 0–0.2)
BASOPHILS NFR BLD AUTO: 0.2 %
BILIRUB SERPL-MCNC: 0.3 MG/DL (ref 0.1–2)
BUN BLD-MCNC: 10 MG/DL (ref 7–18)
CALCIUM BLD-MCNC: 9 MG/DL (ref 8.5–10.1)
CHLORIDE SERPL-SCNC: 109 MMOL/L (ref 98–112)
CO2 SERPL-SCNC: 26 MMOL/L (ref 21–32)
CREAT BLD-MCNC: 0.9 MG/DL
EGFRCR SERPLBLD CKD-EPI 2021: 83 ML/MIN/1.73M2 (ref 60–?)
EOSINOPHIL # BLD AUTO: 0.11 X10(3) UL (ref 0–0.7)
EOSINOPHIL NFR BLD AUTO: 2 %
ERYTHROCYTE [DISTWIDTH] IN BLOOD BY AUTOMATED COUNT: 14.8 %
EST. AVERAGE GLUCOSE BLD GHB EST-MCNC: 123 MG/DL (ref 68–126)
FASTING STATUS PATIENT QL REPORTED: YES
GLOBULIN PLAS-MCNC: 3.8 G/DL (ref 2.8–4.4)
GLUCOSE BLD-MCNC: 88 MG/DL (ref 70–99)
HBA1C MFR BLD: 5.9 % (ref ?–5.7)
HCT VFR BLD AUTO: 35.4 %
HGB BLD-MCNC: 11.3 G/DL
IMM GRANULOCYTES # BLD AUTO: 0.01 X10(3) UL (ref 0–1)
IMM GRANULOCYTES NFR BLD: 0.2 %
LYMPHOCYTES # BLD AUTO: 1.85 X10(3) UL (ref 1–4)
LYMPHOCYTES NFR BLD AUTO: 33.8 %
MCH RBC QN AUTO: 25 PG (ref 26–34)
MCHC RBC AUTO-ENTMCNC: 31.9 G/DL (ref 31–37)
MCV RBC AUTO: 78.3 FL
MONOCYTES # BLD AUTO: 0.38 X10(3) UL (ref 0.1–1)
MONOCYTES NFR BLD AUTO: 6.9 %
NEUTROPHILS # BLD AUTO: 3.11 X10 (3) UL (ref 1.5–7.7)
NEUTROPHILS # BLD AUTO: 3.11 X10(3) UL (ref 1.5–7.7)
NEUTROPHILS NFR BLD AUTO: 56.9 %
OSMOLALITY SERPL CALC.SUM OF ELEC: 288 MOSM/KG (ref 275–295)
P AXIS: 55 DEGREES
P-R INTERVAL: 174 MS
PLATELET # BLD AUTO: 241 10(3)UL (ref 150–450)
POTASSIUM SERPL-SCNC: 4 MMOL/L (ref 3.5–5.1)
PROT SERPL-MCNC: 7.4 G/DL (ref 6.4–8.2)
Q-T INTERVAL: 394 MS
QRS DURATION: 74 MS
QTC CALCULATION (BEZET): 409 MS
R AXIS: 78 DEGREES
RBC # BLD AUTO: 4.52 X10(6)UL
SODIUM SERPL-SCNC: 140 MMOL/L (ref 136–145)
T AXIS: 40 DEGREES
TSI SER-ACNC: 1.63 MIU/ML (ref 0.36–3.74)
VENTRICULAR RATE: 65 BPM
WBC # BLD AUTO: 5.5 X10(3) UL (ref 4–11)

## 2023-09-01 PROCEDURE — 83540 ASSAY OF IRON: CPT | Performed by: NURSE PRACTITIONER

## 2023-09-01 PROCEDURE — 82728 ASSAY OF FERRITIN: CPT | Performed by: NURSE PRACTITIONER

## 2023-09-01 PROCEDURE — 83036 HEMOGLOBIN GLYCOSYLATED A1C: CPT | Performed by: NURSE PRACTITIONER

## 2023-09-01 PROCEDURE — 85025 COMPLETE CBC W/AUTO DIFF WBC: CPT | Performed by: NURSE PRACTITIONER

## 2023-09-01 PROCEDURE — 84443 ASSAY THYROID STIM HORMONE: CPT | Performed by: NURSE PRACTITIONER

## 2023-09-01 PROCEDURE — 83550 IRON BINDING TEST: CPT | Performed by: NURSE PRACTITIONER

## 2023-09-01 PROCEDURE — 36415 COLL VENOUS BLD VENIPUNCTURE: CPT | Performed by: NURSE PRACTITIONER

## 2023-09-01 PROCEDURE — 80053 COMPREHEN METABOLIC PANEL: CPT | Performed by: NURSE PRACTITIONER

## 2023-09-01 RX ORDER — FLUCONAZOLE 150 MG/1
TABLET ORAL
COMMUNITY
End: 2023-09-01 | Stop reason: ALTCHOICE

## 2023-09-01 RX ORDER — TRANEXAMIC ACID 650 MG/1
650 TABLET ORAL
COMMUNITY
Start: 2023-07-22

## 2023-09-01 RX ORDER — DOCUSATE SODIUM 100 MG/1
CAPSULE, LIQUID FILLED ORAL
COMMUNITY
Start: 2023-08-28

## 2023-09-01 RX ORDER — ACETAMINOPHEN 160 MG
2000 TABLET,DISINTEGRATING ORAL DAILY
COMMUNITY
Start: 2023-07-24

## 2023-09-01 RX ORDER — ONDANSETRON 4 MG/1
TABLET, ORALLY DISINTEGRATING ORAL
COMMUNITY
Start: 2023-08-28

## 2023-09-01 RX ORDER — IBUPROFEN 600 MG/1
TABLET ORAL
COMMUNITY
Start: 2023-08-28

## 2023-09-05 DIAGNOSIS — D64.9 ANEMIA, UNSPECIFIED TYPE: Primary | ICD-10-CM

## 2023-09-05 LAB
DEPRECATED HBV CORE AB SER IA-ACNC: 16.5 NG/ML
IRON SATN MFR SERPL: 9 %
IRON SERPL-MCNC: 36 UG/DL
TIBC SERPL-MCNC: 417 UG/DL (ref 240–450)
TRANSFERRIN SERPL-MCNC: 280 MG/DL (ref 200–360)

## 2023-09-19 ENCOUNTER — OFFICE VISIT (OUTPATIENT)
Dept: NEUROLOGY | Facility: CLINIC | Age: 38
End: 2023-09-19
Payer: COMMERCIAL

## 2023-09-19 DIAGNOSIS — G43.711 CHRONIC MIGRAINE WITHOUT AURA, INTRACTABLE, WITH STATUS MIGRAINOSUS: Primary | ICD-10-CM

## 2023-09-19 PROCEDURE — 64615 CHEMODENERV MUSC MIGRAINE: CPT | Performed by: OTHER

## 2023-09-19 NOTE — PROCEDURES
PROCEDURE: Botox Injections (MIGRAINE PROTOCOL)   PRE-OPERATIVE DIAGNOSIS: Chronic Migraine  POST-OPERATIVE DIAGNOSIS: same as above   BLOOD LOSS: minimal COMPLICATIONS: none     DESCRIPTION OF PROCEDURE: After a discussion of the risks and benefits, the patient consented to the procedure. The patient was taken to the procedure room. The upper back, neck, and occipital area was prepped with alcohol swabs. The 2 Botox vials containing 100 units each, were reconstituted with saline. There are 31 distinct targets in the standard protocol. Following muscles and units were injected:     10 units divided between 2 sites. Procerus 5 units in 1 site. Frontalis 20 units divided between 4 sites. Temporalis 40 units divided between 8 sites. Occipitalis 40 units divided between 8 sites. Cervical paraspinal 20 units divided between 4 sites. Trapezius 30 units divided between 6 sites. Patient tolerated the procedure well. Total of 165 Units of botulinum toxin were used and 35 Units were wasted. Nahid Broussard might have provided some extra benefit but unfortunately it was not affordable and therefore she stopped.

## 2023-09-19 NOTE — PROGRESS NOTES
Paperwork noting that patient may bear financial responsibility for procedure(s) performed in clinic today signed prior to proceeding with procedure(s). Furthermore, patient notified that they should contact their insurer to verify that your procedure/test has been approved and is a COVERED benefit. Although the [facility] staff does its due diligence, the insurance carrier gives the disclaimer that \"Although the procedure is authorized, this does not guarantee payment. \"    Ultimately the patient is responsible for payment. Botox is:  [x] Buy and Bill  [] Patient Supplied      Botox Reauthorization Questions:  Has the patient experienced a reduction in frequency of migraines since starting Botox? yes  If yes, by what percentage? 50%  Has the intensity of migraines decreased since starting Botox? yes  If yes, by what percentage?  50%

## 2023-09-21 ENCOUNTER — TELEPHONE (OUTPATIENT)
Dept: NEUROLOGY | Facility: CLINIC | Age: 38
End: 2023-09-21

## 2023-09-21 NOTE — TELEPHONE ENCOUNTER
Received PA form via fax for Ubrelvy. Form has been completed and sent to [facility]. Will await determination. Reviewed and electronically signed by:  [address] [facility], [address] [place] [address], [facility]

## 2023-09-22 NOTE — TELEPHONE ENCOUNTER
Received approval for Ubrelvy 100mg. Granted 9/21/23-9/21/24. Form has been sent for scanning. Reviewed and electronically signed by:  [address] [facility], [address] [place] [address], [facility]

## 2023-10-13 ENCOUNTER — HOSPITAL ENCOUNTER (OUTPATIENT)
Dept: GASTROENTEROLOGY | Age: 38
Discharge: HOME | End: 2023-10-13
Attending: STUDENT IN AN ORGANIZED HEALTH CARE EDUCATION/TRAINING PROGRAM

## 2023-10-13 ENCOUNTER — ANESTHESIA EVENT (OUTPATIENT)
Dept: GASTROENTEROLOGY | Age: 38
End: 2023-10-13

## 2023-10-13 ENCOUNTER — ANESTHESIA (OUTPATIENT)
Dept: GASTROENTEROLOGY | Age: 38
End: 2023-10-13

## 2023-10-13 VITALS
HEART RATE: 61 BPM | RESPIRATION RATE: 15 BRPM | BODY MASS INDEX: 26.52 KG/M2 | WEIGHT: 175 LBS | SYSTOLIC BLOOD PRESSURE: 148 MMHG | OXYGEN SATURATION: 99 % | HEIGHT: 68 IN | TEMPERATURE: 96.8 F | DIASTOLIC BLOOD PRESSURE: 91 MMHG

## 2023-10-13 DIAGNOSIS — K21.9 GASTROESOPHAGEAL REFLUX DISEASE WITHOUT ESOPHAGITIS: ICD-10-CM

## 2023-10-13 DIAGNOSIS — Z01.818 ENCOUNTER FOR OTHER PREPROCEDURAL EXAMINATION: ICD-10-CM

## 2023-10-13 LAB
B-HCG UR QL: NEGATIVE
INTERNAL PROCEDURAL CONTROLS ACCEPTABLE: YES
TEST LOT EXPIRATION DATE: NORMAL
TEST LOT NUMBER: NORMAL

## 2023-10-13 PROCEDURE — 10006023 HB SUPPLY 272

## 2023-10-13 PROCEDURE — 13000008 HB ANESTHESIA MAC OUTSIDE OR

## 2023-10-13 PROCEDURE — A43239 ANES UGI ENDO; W/BX 1/MX: Performed by: NURSE ANESTHETIST, CERTIFIED REGISTERED

## 2023-10-13 PROCEDURE — 13000024 HB GI COMPLEX CASE S/U + 1ST 15 MIN

## 2023-10-13 PROCEDURE — 13000025 HB GI COMPLEX CASE EACH ADD MINUTE

## 2023-10-13 PROCEDURE — 10002807 HB RX 258: Performed by: ANESTHESIOLOGY

## 2023-10-13 PROCEDURE — 88342 IMHCHEM/IMCYTCHM 1ST ANTB: CPT | Performed by: STUDENT IN AN ORGANIZED HEALTH CARE EDUCATION/TRAINING PROGRAM

## 2023-10-13 PROCEDURE — 10002801 HB RX 250 W/O HCPCS: Performed by: ANESTHESIOLOGY

## 2023-10-13 PROCEDURE — 81025 URINE PREGNANCY TEST: CPT | Performed by: STUDENT IN AN ORGANIZED HEALTH CARE EDUCATION/TRAINING PROGRAM

## 2023-10-13 PROCEDURE — 13000001 HB PHASE II RECOVERY EA 30 MINUTES

## 2023-10-13 PROCEDURE — A43239 ANES UGI ENDO; W/BX 1/MX: Performed by: ANESTHESIOLOGY

## 2023-10-13 RX ORDER — SODIUM CHLORIDE 9 MG/ML
INJECTION, SOLUTION INTRAVENOUS CONTINUOUS PRN
Status: DISCONTINUED | OUTPATIENT
Start: 2023-10-13 | End: 2023-10-13

## 2023-10-13 RX ORDER — ONDANSETRON 2 MG/ML
4 INJECTION INTRAMUSCULAR; INTRAVENOUS EVERY 6 HOURS PRN
Status: DISCONTINUED | OUTPATIENT
Start: 2023-10-13 | End: 2023-10-15 | Stop reason: HOSPADM

## 2023-10-13 RX ORDER — LIDOCAINE HYDROCHLORIDE 20 MG/ML
INJECTION, SOLUTION INFILTRATION; PERINEURAL PRN
Status: DISCONTINUED | OUTPATIENT
Start: 2023-10-13 | End: 2023-10-13

## 2023-10-13 RX ORDER — SODIUM CHLORIDE, SODIUM LACTATE, POTASSIUM CHLORIDE, CALCIUM CHLORIDE 600; 310; 30; 20 MG/100ML; MG/100ML; MG/100ML; MG/100ML
INJECTION, SOLUTION INTRAVENOUS CONTINUOUS
Status: DISCONTINUED | OUTPATIENT
Start: 2023-10-13 | End: 2023-10-15 | Stop reason: HOSPADM

## 2023-10-13 RX ADMIN — FENTANYL CITRATE 25 MCG: 50 INJECTION, SOLUTION INTRAMUSCULAR; INTRAVENOUS at 15:43

## 2023-10-13 RX ADMIN — FENTANYL CITRATE 25 MCG: 50 INJECTION, SOLUTION INTRAMUSCULAR; INTRAVENOUS at 15:35

## 2023-10-13 RX ADMIN — SODIUM CHLORIDE: 9 INJECTION, SOLUTION INTRAVENOUS at 15:09

## 2023-10-13 RX ADMIN — LIDOCAINE HYDROCHLORIDE 3 ML: 20 INJECTION, SOLUTION INFILTRATION; PERINEURAL at 15:15

## 2023-10-13 RX ADMIN — FENTANYL CITRATE 50 MCG: 50 INJECTION, SOLUTION INTRAMUSCULAR; INTRAVENOUS at 15:15

## 2023-10-13 ASSESSMENT — ACTIVITIES OF DAILY LIVING (ADL)
ADL_SHORT_OF_BREATH: NO
RECENT_DECLINE_ADL: NO
HISTORY OF FALLING IN THE LAST YEAR (PRIOR TO ADMISSION): NO
NEEDS_ASSIST: NO
ADL_SCORE: 12
ADL_BEFORE_ADMISSION: INDEPENDENT

## 2023-10-13 ASSESSMENT — PAIN SCALES - GENERAL
PAINLEVEL_OUTOF10: 0

## 2023-10-13 ASSESSMENT — COGNITIVE AND FUNCTIONAL STATUS - GENERAL
ARE YOU DEAF OR DO YOU HAVE SERIOUS DIFFICULTY  HEARING: NO
ARE YOU BLIND OR DO YOU HAVE SERIOUS DIFFICULTY SEEING, EVEN WHEN WEARING GLASSES: NO

## 2023-10-18 LAB
ASR DISCLAIMER: NORMAL
CASE RPRT: NORMAL
CLINICAL INFO: NORMAL
PATH REPORT.FINAL DX SPEC: NORMAL
PATH REPORT.GROSS SPEC: NORMAL

## 2023-11-04 ENCOUNTER — HOSPITAL ENCOUNTER (OUTPATIENT)
Dept: GENERAL RADIOLOGY | Facility: HOSPITAL | Age: 38
Discharge: HOME | End: 2023-11-04
Attending: NURSE PRACTITIONER
Payer: COMMERCIAL

## 2023-11-04 DIAGNOSIS — E66.01 MORBIDLY OBESE (HCC): ICD-10-CM

## 2023-11-04 DIAGNOSIS — Z01.818 PRE-OP EXAM: ICD-10-CM

## 2023-11-04 DIAGNOSIS — D64.9 ANEMIA, UNSPECIFIED TYPE: ICD-10-CM

## 2023-11-04 PROCEDURE — 74240 X-RAY XM UPR GI TRC 1CNTRST: CPT | Performed by: NURSE PRACTITIONER

## 2023-11-20 ENCOUNTER — HOSPITAL ENCOUNTER (OUTPATIENT)
Age: 38
Setting detail: SURGERY ADMIT
End: 2023-11-20
Attending: SURGERY | Admitting: SURGERY

## 2023-12-01 RX ORDER — FERROUS SULFATE 325(65) MG
325 TABLET ORAL
COMMUNITY

## 2023-12-01 RX ORDER — UBROGEPANT 100 MG/1
100 TABLET ORAL 2 TIMES DAILY PRN
COMMUNITY
Start: 2023-10-06

## 2023-12-01 RX ORDER — MULTIVIT-MIN/IRON/FOLIC ACID/K 18-600-40
50 CAPSULE ORAL DAILY
COMMUNITY

## 2023-12-07 ENCOUNTER — ANESTHESIA EVENT (OUTPATIENT)
Dept: SURGERY | Age: 38
End: 2023-12-07

## 2023-12-11 ENCOUNTER — ANESTHESIA (OUTPATIENT)
Dept: SURGERY | Age: 38
End: 2023-12-11

## 2023-12-12 ASSESSMENT — ACTIVITIES OF DAILY LIVING (ADL)
ADL_SHORT_OF_BREATH: NO
RECENT_DECLINE_ADL: NO
ADL_BEFORE_ADMISSION: INDEPENDENT
ADL_SCORE: 12
HISTORY OF FALLING IN THE LAST YEAR (PRIOR TO ADMISSION): NO
NEEDS_ASSIST: NO

## 2023-12-12 ASSESSMENT — COGNITIVE AND FUNCTIONAL STATUS - GENERAL: ARE YOU BLIND OR DO YOU HAVE SERIOUS DIFFICULTY SEEING, EVEN WHEN WEARING GLASSES: NO

## 2023-12-13 ENCOUNTER — HOSPITAL ENCOUNTER (INPATIENT)
Age: 38
LOS: 1 days | Discharge: HOME | DRG: 621 | End: 2023-12-14
Attending: SURGERY | Admitting: SURGERY

## 2023-12-13 DIAGNOSIS — E66.01 MORBID (SEVERE) OBESITY DUE TO EXCESS CALORIES (CMD): ICD-10-CM

## 2023-12-13 DIAGNOSIS — K44.9 DIAPHRAGMATIC HERNIA WITHOUT OBSTRUCTION OR GANGRENE: ICD-10-CM

## 2023-12-13 PROCEDURE — 13001086 HB INCENTIVE SPIROMETER W INSTRUCT

## 2023-12-13 PROCEDURE — A43644 ANES LAP ROUX EN Y GASTRIC BYPASS LESS THAN 150 CM: Performed by: NURSE ANESTHETIST, CERTIFIED REGISTERED

## 2023-12-13 PROCEDURE — 10002800 HB RX 250 W HCPCS: Performed by: SURGERY

## 2023-12-13 PROCEDURE — A43644 ANES LAP ROUX EN Y GASTRIC BYPASS LESS THAN 150 CM: Performed by: ANESTHESIOLOGY

## 2023-12-13 PROCEDURE — 13000003 HB ANESTHESIA  GENERAL EA ADD MINUTE: Performed by: SURGERY

## 2023-12-13 PROCEDURE — 0BQT4ZZ REPAIR DIAPHRAGM, PERCUTANEOUS ENDOSCOPIC APPROACH: ICD-10-PCS | Performed by: SURGERY

## 2023-12-13 PROCEDURE — 10002800 HB RX 250 W HCPCS: Performed by: NURSE ANESTHETIST, CERTIFIED REGISTERED

## 2023-12-13 PROCEDURE — 10004452 HB PACU ADDL 30 MINUTES: Performed by: SURGERY

## 2023-12-13 PROCEDURE — 10002807 HB RX 258: Performed by: STUDENT IN AN ORGANIZED HEALTH CARE EDUCATION/TRAINING PROGRAM

## 2023-12-13 PROCEDURE — 10006023 HB SUPPLY 272: Performed by: SURGERY

## 2023-12-13 PROCEDURE — 10004451 HB PACU RECOVERY 1ST 30 MINUTES: Performed by: SURGERY

## 2023-12-13 PROCEDURE — 13000039 HB MAJOR COMPLEX CASE EA ADD MINUTE: Performed by: SURGERY

## 2023-12-13 PROCEDURE — 96372 THER/PROPH/DIAG INJ SC/IM: CPT | Performed by: STUDENT IN AN ORGANIZED HEALTH CARE EDUCATION/TRAINING PROGRAM

## 2023-12-13 PROCEDURE — 10002801 HB RX 250 W/O HCPCS: Performed by: NURSE ANESTHETIST, CERTIFIED REGISTERED

## 2023-12-13 PROCEDURE — 10002801 HB RX 250 W/O HCPCS: Performed by: SURGERY

## 2023-12-13 PROCEDURE — 10002807 HB RX 258: Performed by: NURSE ANESTHETIST, CERTIFIED REGISTERED

## 2023-12-13 PROCEDURE — 10000002 HB ROOM CHARGE MED SURG

## 2023-12-13 PROCEDURE — 10002800 HB RX 250 W HCPCS: Performed by: STUDENT IN AN ORGANIZED HEALTH CARE EDUCATION/TRAINING PROGRAM

## 2023-12-13 PROCEDURE — 13000002 HB ANESTHESIA  GENERAL  S/U + 1ST 15 MIN: Performed by: SURGERY

## 2023-12-13 PROCEDURE — 10002016 HB COUNTER INCENTIVE SPIROMETRY

## 2023-12-13 PROCEDURE — 10006027 HB SUPPLY 278: Performed by: SURGERY

## 2023-12-13 PROCEDURE — 0D164ZA BYPASS STOMACH TO JEJUNUM, PERCUTANEOUS ENDOSCOPIC APPROACH: ICD-10-PCS | Performed by: SURGERY

## 2023-12-13 PROCEDURE — 13000038 HB MAJOR COMPLEX CASE S/U + 1ST 15 MIN: Performed by: SURGERY

## 2023-12-13 PROCEDURE — 10002803 HB RX 637: Performed by: SURGERY

## 2023-12-13 PROCEDURE — C1781 MESH (IMPLANTABLE): HCPCS | Performed by: SURGERY

## 2023-12-13 PROCEDURE — 81025 URINE PREGNANCY TEST: CPT | Performed by: SURGERY

## 2023-12-13 DEVICE — ARTICULATING RELOAD WITH TRI-STAPLE TECHNOLOGY
Type: IMPLANTABLE DEVICE | Site: ABDOMEN | Status: FUNCTIONAL
Brand: ENDO GIA

## 2023-12-13 DEVICE — REINF STPL LN BIOABSORBABLE STRL GORE SMGRD LF PUR ENDO GIA: Type: IMPLANTABLE DEVICE | Site: ABDOMEN | Status: FUNCTIONAL

## 2023-12-13 RX ORDER — BUPIVACAINE HYDROCHLORIDE AND EPINEPHRINE 5; 5 MG/ML; UG/ML
INJECTION, SOLUTION EPIDURAL; INTRACAUDAL; PERINEURAL PRN
Status: DISCONTINUED | OUTPATIENT
Start: 2023-12-13 | End: 2023-12-13 | Stop reason: HOSPADM

## 2023-12-13 RX ORDER — 0.9 % SODIUM CHLORIDE 0.9 %
2 VIAL (ML) INJECTION EVERY 12 HOURS SCHEDULED
Status: DISCONTINUED | OUTPATIENT
Start: 2023-12-13 | End: 2023-12-13 | Stop reason: HOSPADM

## 2023-12-13 RX ORDER — SODIUM CHLORIDE, SODIUM LACTATE, POTASSIUM CHLORIDE, CALCIUM CHLORIDE 600; 310; 30; 20 MG/100ML; MG/100ML; MG/100ML; MG/100ML
INJECTION, SOLUTION INTRAVENOUS CONTINUOUS PRN
Status: DISCONTINUED | OUTPATIENT
Start: 2023-12-13 | End: 2023-12-13

## 2023-12-13 RX ORDER — KETOROLAC TROMETHAMINE 15 MG/ML
15 INJECTION, SOLUTION INTRAMUSCULAR; INTRAVENOUS EVERY 6 HOURS
Status: DISCONTINUED | OUTPATIENT
Start: 2023-12-13 | End: 2023-12-14 | Stop reason: HOSPADM

## 2023-12-13 RX ORDER — PROPOFOL 10 MG/ML
INJECTION, EMULSION INTRAVENOUS PRN
Status: DISCONTINUED | OUTPATIENT
Start: 2023-12-13 | End: 2023-12-13

## 2023-12-13 RX ORDER — ACETAMINOPHEN 10 MG/ML
1000 INJECTION, SOLUTION INTRAVENOUS EVERY 6 HOURS
Status: DISCONTINUED | OUTPATIENT
Start: 2023-12-13 | End: 2023-12-14 | Stop reason: HOSPADM

## 2023-12-13 RX ORDER — ENOXAPARIN SODIUM 100 MG/ML
40 INJECTION SUBCUTANEOUS ONCE
Status: COMPLETED | OUTPATIENT
Start: 2023-12-13 | End: 2023-12-13

## 2023-12-13 RX ORDER — PROCHLORPERAZINE EDISYLATE 5 MG/ML
5 INJECTION INTRAMUSCULAR; INTRAVENOUS EVERY 6 HOURS SCHEDULED
Status: DISCONTINUED | OUTPATIENT
Start: 2023-12-13 | End: 2023-12-14 | Stop reason: HOSPADM

## 2023-12-13 RX ORDER — ENOXAPARIN SODIUM 100 MG/ML
40 INJECTION SUBCUTANEOUS EVERY 12 HOURS
Status: DISCONTINUED | OUTPATIENT
Start: 2023-12-13 | End: 2023-12-14 | Stop reason: HOSPADM

## 2023-12-13 RX ORDER — HYDRALAZINE HYDROCHLORIDE 20 MG/ML
5 INJECTION INTRAMUSCULAR; INTRAVENOUS EVERY 10 MIN PRN
Status: DISCONTINUED | OUTPATIENT
Start: 2023-12-13 | End: 2023-12-13 | Stop reason: HOSPADM

## 2023-12-13 RX ORDER — GLYCOPYRROLATE 0.2 MG/ML
INJECTION, SOLUTION INTRAMUSCULAR; INTRAVENOUS PRN
Status: DISCONTINUED | OUTPATIENT
Start: 2023-12-13 | End: 2023-12-13

## 2023-12-13 RX ORDER — ROCURONIUM BROMIDE 10 MG/ML
INJECTION, SOLUTION INTRAVENOUS PRN
Status: DISCONTINUED | OUTPATIENT
Start: 2023-12-13 | End: 2023-12-13

## 2023-12-13 RX ORDER — SODIUM CHLORIDE, SODIUM LACTATE, POTASSIUM CHLORIDE, CALCIUM CHLORIDE 600; 310; 30; 20 MG/100ML; MG/100ML; MG/100ML; MG/100ML
INJECTION, SOLUTION INTRAVENOUS CONTINUOUS
Status: DISCONTINUED | OUTPATIENT
Start: 2023-12-13 | End: 2023-12-13 | Stop reason: HOSPADM

## 2023-12-13 RX ORDER — NEOSTIGMINE METHYLSULFATE 1 MG/ML
INJECTION, SOLUTION INTRAVENOUS PRN
Status: DISCONTINUED | OUTPATIENT
Start: 2023-12-13 | End: 2023-12-13

## 2023-12-13 RX ORDER — SODIUM CHLORIDE, SODIUM LACTATE, POTASSIUM CHLORIDE, CALCIUM CHLORIDE 600; 310; 30; 20 MG/100ML; MG/100ML; MG/100ML; MG/100ML
INJECTION, SOLUTION INTRAVENOUS CONTINUOUS
Status: DISCONTINUED | OUTPATIENT
Start: 2023-12-13 | End: 2023-12-14 | Stop reason: HOSPADM

## 2023-12-13 RX ORDER — CHLORHEXIDINE GLUCONATE ORAL RINSE 1.2 MG/ML
15 SOLUTION DENTAL
Status: COMPLETED | OUTPATIENT
Start: 2023-12-13 | End: 2023-12-13

## 2023-12-13 RX ORDER — MIDAZOLAM HYDROCHLORIDE 1 MG/ML
INJECTION, SOLUTION INTRAMUSCULAR; INTRAVENOUS PRN
Status: DISCONTINUED | OUTPATIENT
Start: 2023-12-13 | End: 2023-12-13

## 2023-12-13 RX ORDER — ACETAMINOPHEN 10 MG/ML
1000 INJECTION, SOLUTION INTRAVENOUS ONCE
Status: COMPLETED | OUTPATIENT
Start: 2023-12-13 | End: 2023-12-13

## 2023-12-13 RX ORDER — KETOROLAC TROMETHAMINE 15 MG/ML
15 INJECTION, SOLUTION INTRAMUSCULAR; INTRAVENOUS EVERY 6 HOURS SCHEDULED
Status: DISCONTINUED | OUTPATIENT
Start: 2023-12-14 | End: 2023-12-13

## 2023-12-13 RX ORDER — CEFAZOLIN SODIUM 1 G/3ML
INJECTION, POWDER, FOR SOLUTION INTRAMUSCULAR; INTRAVENOUS PRN
Status: DISCONTINUED | OUTPATIENT
Start: 2023-12-13 | End: 2023-12-13

## 2023-12-13 RX ORDER — SCOLOPAMINE TRANSDERMAL SYSTEM 1 MG/1
1 PATCH, EXTENDED RELEASE TRANSDERMAL ONCE
Status: DISCONTINUED | OUTPATIENT
Start: 2023-12-13 | End: 2023-12-13

## 2023-12-13 RX ORDER — ONDANSETRON 2 MG/ML
INJECTION INTRAMUSCULAR; INTRAVENOUS PRN
Status: DISCONTINUED | OUTPATIENT
Start: 2023-12-13 | End: 2023-12-13

## 2023-12-13 RX ORDER — LIDOCAINE HYDROCHLORIDE 20 MG/ML
INJECTION, SOLUTION INFILTRATION; PERINEURAL PRN
Status: DISCONTINUED | OUTPATIENT
Start: 2023-12-13 | End: 2023-12-13

## 2023-12-13 RX ORDER — KETOROLAC TROMETHAMINE 15 MG/ML
15 INJECTION, SOLUTION INTRAMUSCULAR; INTRAVENOUS EVERY 6 HOURS PRN
Status: DISCONTINUED | OUTPATIENT
Start: 2023-12-13 | End: 2023-12-13

## 2023-12-13 RX ORDER — ONDANSETRON 2 MG/ML
4 INJECTION INTRAMUSCULAR; INTRAVENOUS EVERY 6 HOURS SCHEDULED
Status: DISCONTINUED | OUTPATIENT
Start: 2023-12-13 | End: 2023-12-14 | Stop reason: HOSPADM

## 2023-12-13 RX ADMIN — CEFAZOLIN 3 G: 1 INJECTION, POWDER, FOR SOLUTION INTRAMUSCULAR; INTRAVENOUS at 07:16

## 2023-12-13 RX ADMIN — SCOPALAMINE 1 PATCH: 1 PATCH, EXTENDED RELEASE TRANSDERMAL at 06:33

## 2023-12-13 RX ADMIN — MIDAZOLAM HYDROCHLORIDE 2 MG: 1 INJECTION, SOLUTION INTRAMUSCULAR; INTRAVENOUS at 07:12

## 2023-12-13 RX ADMIN — CHLORHEXIDINE GLUCONATE 15 ML: 1.2 RINSE ORAL at 06:33

## 2023-12-13 RX ADMIN — ROCURONIUM BROMIDE 10 MG: 10 INJECTION INTRAVENOUS at 07:16

## 2023-12-13 RX ADMIN — Medication 200 MCG: at 07:21

## 2023-12-13 RX ADMIN — ENOXAPARIN SODIUM 40 MG: 100 INJECTION SUBCUTANEOUS at 18:24

## 2023-12-13 RX ADMIN — ACETAMINOPHEN 1000 MG: 10 INJECTION, SOLUTION INTRAVENOUS at 06:33

## 2023-12-13 RX ADMIN — ONDANSETRON 4 MG: 2 INJECTION INTRAMUSCULAR; INTRAVENOUS at 20:45

## 2023-12-13 RX ADMIN — ONDANSETRON 4 MG: 2 INJECTION INTRAMUSCULAR; INTRAVENOUS at 10:10

## 2023-12-13 RX ADMIN — HYDROMORPHONE HYDROCHLORIDE 0.2 MG: 1 INJECTION, SOLUTION INTRAMUSCULAR; INTRAVENOUS; SUBCUTANEOUS at 11:22

## 2023-12-13 RX ADMIN — NEOSTIGMINE METHYLSULFATE 4 MG: 1 INJECTION INTRAVENOUS at 10:23

## 2023-12-13 RX ADMIN — Medication 100 MCG: at 07:53

## 2023-12-13 RX ADMIN — HYDROMORPHONE HYDROCHLORIDE 0.2 MG: 1 INJECTION, SOLUTION INTRAMUSCULAR; INTRAVENOUS; SUBCUTANEOUS at 11:52

## 2023-12-13 RX ADMIN — ROCURONIUM BROMIDE 10 MG: 10 INJECTION INTRAVENOUS at 08:47

## 2023-12-13 RX ADMIN — Medication 100 MCG: at 07:35

## 2023-12-13 RX ADMIN — KETOROLAC TROMETHAMINE 15 MG: 15 INJECTION, SOLUTION INTRAMUSCULAR; INTRAVENOUS at 20:44

## 2023-12-13 RX ADMIN — ROCURONIUM BROMIDE 10 MG: 10 INJECTION INTRAVENOUS at 07:57

## 2023-12-13 RX ADMIN — SODIUM CHLORIDE, POTASSIUM CHLORIDE, SODIUM LACTATE AND CALCIUM CHLORIDE: 600; 310; 30; 20 INJECTION, SOLUTION INTRAVENOUS at 14:44

## 2023-12-13 RX ADMIN — Medication 100 MG: at 07:16

## 2023-12-13 RX ADMIN — GLYCOPYRROLATE 0.8 MG: 0.2 INJECTION, SOLUTION INTRAMUSCULAR; INTRAVENOUS at 10:23

## 2023-12-13 RX ADMIN — ONDANSETRON 4 MG: 2 INJECTION INTRAMUSCULAR; INTRAVENOUS at 15:21

## 2023-12-13 RX ADMIN — KETOROLAC TROMETHAMINE 30 MG: 30 INJECTION, SOLUTION INTRAMUSCULAR at 10:16

## 2023-12-13 RX ADMIN — PROPOFOL 200 MG: 10 INJECTION, EMULSION INTRAVENOUS at 07:16

## 2023-12-13 RX ADMIN — HYDROMORPHONE HYDROCHLORIDE 0.5 MG: 1 INJECTION, SOLUTION INTRAMUSCULAR; INTRAVENOUS; SUBCUTANEOUS at 07:33

## 2023-12-13 RX ADMIN — ROCURONIUM BROMIDE 10 MG: 10 INJECTION INTRAVENOUS at 08:27

## 2023-12-13 RX ADMIN — PROCHLORPERAZINE EDISYLATE 5 MG: 5 INJECTION INTRAMUSCULAR; INTRAVENOUS at 14:21

## 2023-12-13 RX ADMIN — ROCURONIUM BROMIDE 40 MG: 10 INJECTION INTRAVENOUS at 07:20

## 2023-12-13 RX ADMIN — HYDROMORPHONE HYDROCHLORIDE 0.2 MG: 1 INJECTION, SOLUTION INTRAMUSCULAR; INTRAVENOUS; SUBCUTANEOUS at 10:54

## 2023-12-13 RX ADMIN — FENTANYL CITRATE 25 MCG: 50 INJECTION INTRAMUSCULAR; INTRAVENOUS at 11:00

## 2023-12-13 RX ADMIN — SODIUM CHLORIDE, POTASSIUM CHLORIDE, SODIUM LACTATE AND CALCIUM CHLORIDE: 600; 310; 30; 20 INJECTION, SOLUTION INTRAVENOUS at 08:29

## 2023-12-13 RX ADMIN — CEFAZOLIN 3 G: 1 INJECTION, POWDER, FOR SOLUTION INTRAMUSCULAR; INTRAVENOUS at 10:03

## 2023-12-13 RX ADMIN — FENTANYL CITRATE 25 MCG: 50 INJECTION INTRAMUSCULAR; INTRAVENOUS at 11:05

## 2023-12-13 RX ADMIN — Medication 100 MCG: at 07:27

## 2023-12-13 RX ADMIN — LIDOCAINE HYDROCHLORIDE 5 ML: 20 INJECTION, SOLUTION INFILTRATION; PERINEURAL at 07:16

## 2023-12-13 RX ADMIN — ACETAMINOPHEN 1000 MG: 10 INJECTION, SOLUTION INTRAVENOUS at 14:21

## 2023-12-13 RX ADMIN — HYDROMORPHONE HYDROCHLORIDE 0.2 MG: 1 INJECTION, SOLUTION INTRAMUSCULAR; INTRAVENOUS; SUBCUTANEOUS at 11:17

## 2023-12-13 RX ADMIN — ENOXAPARIN SODIUM 40 MG: 40 INJECTION SUBCUTANEOUS at 06:33

## 2023-12-13 RX ADMIN — KETOROLAC TROMETHAMINE 15 MG: 15 INJECTION, SOLUTION INTRAMUSCULAR; INTRAVENOUS at 15:21

## 2023-12-13 RX ADMIN — ACETAMINOPHEN 1000 MG: 10 INJECTION, SOLUTION INTRAVENOUS at 18:23

## 2023-12-13 RX ADMIN — FENTANYL CITRATE 25 MCG: 50 INJECTION INTRAMUSCULAR; INTRAVENOUS at 11:11

## 2023-12-13 RX ADMIN — HYDROMORPHONE HYDROCHLORIDE 0.2 MG: 1 INJECTION, SOLUTION INTRAMUSCULAR; INTRAVENOUS; SUBCUTANEOUS at 10:48

## 2023-12-13 RX ADMIN — PROCHLORPERAZINE EDISYLATE 5 MG: 5 INJECTION INTRAMUSCULAR; INTRAVENOUS at 18:24

## 2023-12-13 RX ADMIN — Medication 100 MCG: at 07:38

## 2023-12-13 RX ADMIN — SODIUM CHLORIDE, POTASSIUM CHLORIDE, SODIUM LACTATE AND CALCIUM CHLORIDE: 600; 310; 30; 20 INJECTION, SOLUTION INTRAVENOUS at 07:16

## 2023-12-13 RX ADMIN — FENTANYL CITRATE 25 MCG: 50 INJECTION INTRAMUSCULAR; INTRAVENOUS at 11:30

## 2023-12-13 RX ADMIN — FENTANYL CITRATE 100 MCG: 50 INJECTION INTRAMUSCULAR; INTRAVENOUS at 07:16

## 2023-12-13 SDOH — HEALTH STABILITY: PHYSICAL HEALTH: DO YOU HAVE DIFFICULTY DRESSING OR BATHING?: NO

## 2023-12-13 SDOH — ECONOMIC STABILITY: GENERAL

## 2023-12-13 SDOH — HEALTH STABILITY: GENERAL: BECAUSE OF A PHYSICAL, MENTAL, OR EMOTIONAL CONDITION, DO YOU HAVE DIFFICULTY DOING ERRANDS ALONE?: NO

## 2023-12-13 SDOH — ECONOMIC STABILITY: HOUSING INSECURITY: WHAT IS YOUR LIVING SITUATION TODAY?: HOUSE

## 2023-12-13 SDOH — SOCIAL STABILITY: SOCIAL NETWORK: SUPPORT SYSTEMS: FAMILY MEMBERS

## 2023-12-13 SDOH — ECONOMIC STABILITY: TRANSPORTATION INSECURITY
IN THE PAST 12 MONTHS, HAS LACK OF TRANSPORTATION KEPT YOU FROM MEETINGS, WORK, OR FROM GETTING THINGS NEEDED FOR DAILY LIVING?: NO

## 2023-12-13 SDOH — ECONOMIC STABILITY: HOUSING INSECURITY: WHAT IS YOUR LIVING SITUATION TODAY?: ALONE

## 2023-12-13 SDOH — HEALTH STABILITY: GENERAL
BECAUSE OF A PHYSICAL, MENTAL, OR EMOTIONAL CONDITION, DO YOU HAVE SERIOUS DIFFICULTY CONCENTRATING, REMEMBERING OR MAKING DECISIONS?: NO

## 2023-12-13 SDOH — SOCIAL STABILITY: SOCIAL NETWORK
HOW OFTEN DO YOU SEE OR TALK TO PEOPLE THAT YOU CARE ABOUT AND FEEL CLOSE TO? (FOR EXAMPLE: TALKING TO FRIENDS ON THE PHONE, VISITING FRIENDS OR FAMILY, GOING TO CHURCH OR CLUB MEETINGS): 3 TO 5 TIMES A WEEK

## 2023-12-13 SDOH — HEALTH STABILITY: PHYSICAL HEALTH: DO YOU HAVE SERIOUS DIFFICULTY WALKING OR CLIMBING STAIRS?: NO

## 2023-12-13 SDOH — ECONOMIC STABILITY: FOOD INSECURITY: HOW OFTEN IN THE PAST 12 MONTHS WERE YOU WORRIED OR STRESSED ABOUT HAVING ENOUGH MONEY TO BUY NUTRITIOUS MEALS?: NEVER

## 2023-12-13 SDOH — ECONOMIC STABILITY: HOUSING INSECURITY: ARE YOU WORRIED ABOUT LOSING YOUR HOUSING?: NO

## 2023-12-13 SDOH — ECONOMIC STABILITY: TRANSPORTATION INSECURITY
IN THE PAST 12 MONTHS, HAS THE LACK OF TRANSPORTATION KEPT YOU FROM MEDICAL APPOINTMENTS OR FROM GETTING MEDICATIONS?: NO

## 2023-12-13 ASSESSMENT — PAIN SCALES - GENERAL
PAINLEVEL_OUTOF10: 10
PAINLEVEL_OUTOF10: 10
PAINLEVEL_OUTOF10: 9
PAINLEVEL_OUTOF10: 10
PAINLEVEL_OUTOF10: 0
PAINLEVEL_OUTOF10: 9
PAINLEVEL_OUTOF10: 9
PAINLEVEL_OUTOF10: 10
PAINLEVEL_OUTOF10: 10
PAINLEVEL_OUTOF10: 5
PAINLEVEL_OUTOF10: 8
PAINLEVEL_OUTOF10: 10

## 2023-12-13 ASSESSMENT — PATIENT HEALTH QUESTIONNAIRE - PHQ9
2. FEELING DOWN, DEPRESSED OR HOPELESS: NOT AT ALL
1. LITTLE INTEREST OR PLEASURE IN DOING THINGS: NOT AT ALL
SUM OF ALL RESPONSES TO PHQ9 QUESTIONS 1 AND 2: 0
IS PATIENT ABLE TO COMPLETE PHQ2 OR PHQ9: YES
CLINICAL INTERPRETATION OF PHQ2 SCORE: NO FURTHER SCREENING NEEDED
SUM OF ALL RESPONSES TO PHQ9 QUESTIONS 1 AND 2: 0

## 2023-12-13 ASSESSMENT — LIFESTYLE VARIABLES
ALCOHOL_USE_STATUS: NO OR LOW RISK WITH VALIDATED TOOL
HOW MANY STANDARD DRINKS CONTAINING ALCOHOL DO YOU HAVE ON A TYPICAL DAY: 0,1 OR 2
AUDIT-C TOTAL SCORE: 0
HOW OFTEN DO YOU HAVE 6 OR MORE DRINKS ON ONE OCCASION: NEVER
HOW OFTEN DO YOU HAVE A DRINK CONTAINING ALCOHOL: NEVER

## 2023-12-13 ASSESSMENT — PAIN SCALES - WONG BAKER
WONGBAKER_NUMERICALRESPONSE: 10
WONGBAKER_NUMERICALRESPONSE: 10

## 2023-12-13 ASSESSMENT — ENCOUNTER SYMPTOMS
HEADACHES: 1
EXERCISE TOLERANCE: GOOD (>4 METS)

## 2023-12-13 ASSESSMENT — ACTIVITIES OF DAILY LIVING (ADL)
ADL_BEFORE_ADMISSION: INDEPENDENT
ADL_SHORT_OF_BREATH: NO
RECENT_DECLINE_ADL: NO
ADL_SCORE: 12

## 2023-12-14 VITALS
TEMPERATURE: 97.9 F | WEIGHT: 267.64 LBS | BODY MASS INDEX: 40.56 KG/M2 | SYSTOLIC BLOOD PRESSURE: 135 MMHG | DIASTOLIC BLOOD PRESSURE: 83 MMHG | HEART RATE: 78 BPM | HEIGHT: 68 IN | OXYGEN SATURATION: 97 % | RESPIRATION RATE: 15 BRPM

## 2023-12-14 PROBLEM — K44.9 DIAPHRAGMATIC HERNIA WITHOUT OBSTRUCTION OR GANGRENE: Status: ACTIVE | Noted: 2023-12-14

## 2023-12-14 PROCEDURE — 96372 THER/PROPH/DIAG INJ SC/IM: CPT | Performed by: STUDENT IN AN ORGANIZED HEALTH CARE EDUCATION/TRAINING PROGRAM

## 2023-12-14 PROCEDURE — 10002807 HB RX 258: Performed by: STUDENT IN AN ORGANIZED HEALTH CARE EDUCATION/TRAINING PROGRAM

## 2023-12-14 PROCEDURE — 10002800 HB RX 250 W HCPCS: Performed by: STUDENT IN AN ORGANIZED HEALTH CARE EDUCATION/TRAINING PROGRAM

## 2023-12-14 PROCEDURE — 10002800 HB RX 250 W HCPCS: Performed by: SURGERY

## 2023-12-14 RX ORDER — ACETAMINOPHEN 160 MG/5ML
650 SUSPENSION ORAL EVERY 4 HOURS PRN
Qty: 236 ML | Refills: 1 | Status: SHIPPED | OUTPATIENT
Start: 2023-12-14

## 2023-12-14 RX ORDER — ONDANSETRON 4 MG/1
4 TABLET, ORALLY DISINTEGRATING ORAL EVERY 8 HOURS PRN
Qty: 30 TABLET | Refills: 0 | Status: SHIPPED | OUTPATIENT
Start: 2023-12-14

## 2023-12-14 RX ORDER — OMEPRAZOLE 20 MG/1
20 CAPSULE, DELAYED RELEASE ORAL DAILY
Qty: 90 CAPSULE | Refills: 0 | Status: SHIPPED | OUTPATIENT
Start: 2023-12-14

## 2023-12-14 RX ADMIN — SODIUM CHLORIDE, POTASSIUM CHLORIDE, SODIUM LACTATE AND CALCIUM CHLORIDE: 600; 310; 30; 20 INJECTION, SOLUTION INTRAVENOUS at 00:21

## 2023-12-14 RX ADMIN — ACETAMINOPHEN 1000 MG: 10 INJECTION, SOLUTION INTRAVENOUS at 14:23

## 2023-12-14 RX ADMIN — KETOROLAC TROMETHAMINE 15 MG: 15 INJECTION, SOLUTION INTRAMUSCULAR; INTRAVENOUS at 03:42

## 2023-12-14 RX ADMIN — ACETAMINOPHEN 1000 MG: 10 INJECTION, SOLUTION INTRAVENOUS at 00:31

## 2023-12-14 RX ADMIN — ONDANSETRON 4 MG: 2 INJECTION INTRAMUSCULAR; INTRAVENOUS at 03:42

## 2023-12-14 RX ADMIN — PROCHLORPERAZINE EDISYLATE 5 MG: 5 INJECTION INTRAMUSCULAR; INTRAVENOUS at 12:34

## 2023-12-14 RX ADMIN — ACETAMINOPHEN 1000 MG: 10 INJECTION, SOLUTION INTRAVENOUS at 08:02

## 2023-12-14 RX ADMIN — PROCHLORPERAZINE EDISYLATE 5 MG: 5 INJECTION INTRAMUSCULAR; INTRAVENOUS at 00:20

## 2023-12-14 RX ADMIN — ENOXAPARIN SODIUM 40 MG: 100 INJECTION SUBCUTANEOUS at 06:00

## 2023-12-14 RX ADMIN — PROCHLORPERAZINE EDISYLATE 5 MG: 5 INJECTION INTRAMUSCULAR; INTRAVENOUS at 05:58

## 2023-12-14 RX ADMIN — KETOROLAC TROMETHAMINE 15 MG: 15 INJECTION, SOLUTION INTRAMUSCULAR; INTRAVENOUS at 08:04

## 2023-12-14 RX ADMIN — ONDANSETRON 4 MG: 2 INJECTION INTRAMUSCULAR; INTRAVENOUS at 08:05

## 2023-12-14 ASSESSMENT — PAIN SCALES - GENERAL
PAINLEVEL_OUTOF10: 2
PAINLEVEL_OUTOF10: 5
PAINLEVEL_OUTOF10: 2

## 2024-01-17 ENCOUNTER — OFFICE VISIT (OUTPATIENT)
Dept: INTERNAL MEDICINE CLINIC | Facility: CLINIC | Age: 39
End: 2024-01-17
Payer: COMMERCIAL

## 2024-01-17 VITALS
SYSTOLIC BLOOD PRESSURE: 122 MMHG | HEART RATE: 80 BPM | WEIGHT: 249 LBS | TEMPERATURE: 98 F | RESPIRATION RATE: 16 BRPM | BODY MASS INDEX: 37.74 KG/M2 | HEIGHT: 68 IN | DIASTOLIC BLOOD PRESSURE: 70 MMHG | OXYGEN SATURATION: 98 %

## 2024-01-17 DIAGNOSIS — D50.9 IRON DEFICIENCY ANEMIA, UNSPECIFIED IRON DEFICIENCY ANEMIA TYPE: ICD-10-CM

## 2024-01-17 DIAGNOSIS — Z90.3 S/P GASTRIC SLEEVE PROCEDURE: Primary | ICD-10-CM

## 2024-01-17 DIAGNOSIS — E55.9 VITAMIN D DEFICIENCY: ICD-10-CM

## 2024-01-17 DIAGNOSIS — Z00.00 LABORATORY EXAMINATION ORDERED AS PART OF A ROUTINE GENERAL MEDICAL EXAMINATION: ICD-10-CM

## 2024-01-17 PROCEDURE — 3074F SYST BP LT 130 MM HG: CPT | Performed by: NURSE PRACTITIONER

## 2024-01-17 PROCEDURE — 3078F DIAST BP <80 MM HG: CPT | Performed by: NURSE PRACTITIONER

## 2024-01-17 PROCEDURE — 3008F BODY MASS INDEX DOCD: CPT | Performed by: NURSE PRACTITIONER

## 2024-01-17 PROCEDURE — 99214 OFFICE O/P EST MOD 30 MIN: CPT | Performed by: NURSE PRACTITIONER

## 2024-01-17 NOTE — PROGRESS NOTES
Subjective:   Jaz Long is a 38 year old female who presents for Post-Op (1 month F/u )     Patient had laparoscopic gastric sleeve procedure 12/13. Feeling good. No fevers. Had a drain on left side of abdomen which was removed 1 week post-op. Is still having slight pain with RLQ incision. Takes dissolvable Tylenol for pain. Has started walking on the treadmill for exercise. Is still on a soft diet. Tried eating salmon and had diarrhea right after. Is having formed BMs. Is scheduled for 6 week post-op appointment with surgeon next week. Still is not able to take pills yet, is taking chewable vitamins. Has had low iron and low vitamin D in the past.     History/Other:    Chief Complaint Reviewed and Verified  Nursing Notes Reviewed and   Verified  Tobacco Reviewed  Allergies Reviewed  Medications Reviewed    Problem List Reviewed  Medical History Reviewed  Surgical History   Reviewed  OB Status Reviewed  Family History Reviewed         Tobacco:  She has never smoked tobacco.    Current Outpatient Medications   Medication Sig Dispense Refill    cholecalciferol 50 MCG (2000 UT) Oral Cap Take 1 capsule (2,000 Units total) by mouth daily. Once a week      tranexamic acid 650 MG Oral Tab 1 tablet (650 mg total).      ibuprofen 600 MG Oral Tab TAKE ONE TABLET BY MOUTH ONCE EVERY 6 HOURS AS NEEDED FOR CRAMPING      azelastine 0.1 % Nasal Solution 2 sprays by Nasal route 2 (two) times daily. 30 mL 5    UBRELVY 100 MG Oral Tab Take 100 mg by mouth 2 (two) times daily as needed. Take one tablet at onset of migraine.  May take additional tablet in 2 hours if needed.  Do not exceed two tablets per 24 hour period. 10 tablet 11         Review of Systems:  Review of Systems   Constitutional:  Negative for fever.   Gastrointestinal:  Positive for abdominal pain and diarrhea (depending on food).   All other systems reviewed and are negative.    Review of Systems   Constitutional:  Negative for fever.    Gastrointestinal:  Positive for abdominal pain and diarrhea (depending on food).   All other systems reviewed and are negative.       Objective:   /70   Pulse 80   Temp 98.2 °F (36.8 °C)   Resp 16   Ht 5' 8\" (1.727 m)   Wt 249 lb (112.9 kg)   LMP 01/15/2024 (Exact Date)   SpO2 98%   BMI 37.86 kg/m²  Estimated body mass index is 37.86 kg/m² as calculated from the following:    Height as of this encounter: 5' 8\" (1.727 m).    Weight as of this encounter: 249 lb (112.9 kg).  Physical Exam  Vitals and nursing note reviewed.   Constitutional:       Appearance: Normal appearance.   Cardiovascular:      Rate and Rhythm: Normal rate and regular rhythm.   Pulmonary:      Effort: Pulmonary effort is normal.      Breath sounds: Normal breath sounds. No wheezing.   Abdominal:      General: A surgical scar is present.      Comments: 5 laparoscopic incisions present. Open to air, no signs of infection.   Skin:     General: Skin is warm.   Neurological:      Mental Status: She is alert and oriented to person, place, and time.   Psychiatric:         Mood and Affect: Mood normal.         Behavior: Behavior normal.           Assessment & Plan:   1. S/P gastric sleeve procedure (Primary)  2. Laboratory examination ordered as part of a routine general medical examination  -     CBC With Differential With Platelet; Future; Expected date: 01/17/2024  -     Comp Metabolic Panel (14); Future; Expected date: 01/17/2024  -     Lipid Panel; Future; Expected date: 01/17/2024  -     TSH W Reflex To Free T4; Future; Expected date: 01/17/2024  -     Urinalysis, Routine; Future; Expected date: 01/17/2024  -     Hemoglobin A1C; Future; Expected date: 01/17/2024  3. Iron deficiency anemia, unspecified iron deficiency anemia type  -     Vitamin B12; Future; Expected date: 01/17/2024  -     Iron And Tibc; Future; Expected date: 01/17/2024  -     Ferritin; Future; Expected date: 01/17/2024  4. Vitamin D deficiency  -     Vitamin D;  Future; Expected date: 01/17/2024        Return in about 1 month (around 2/17/2024) for Annual physical.    Charlene Shafer, KEATON, 1/17/2024, 9:49 AM

## 2024-02-03 ENCOUNTER — LAB ENCOUNTER (OUTPATIENT)
Dept: LAB | Age: 39
End: 2024-02-03
Attending: NURSE PRACTITIONER
Payer: COMMERCIAL

## 2024-02-03 DIAGNOSIS — Z00.00 LABORATORY EXAMINATION ORDERED AS PART OF A ROUTINE GENERAL MEDICAL EXAMINATION: ICD-10-CM

## 2024-02-03 DIAGNOSIS — D50.9 IRON DEFICIENCY ANEMIA, UNSPECIFIED IRON DEFICIENCY ANEMIA TYPE: ICD-10-CM

## 2024-02-03 DIAGNOSIS — E55.9 VITAMIN D DEFICIENCY: ICD-10-CM

## 2024-02-03 LAB
ALBUMIN SERPL-MCNC: 3.5 G/DL (ref 3.4–5)
ALBUMIN/GLOB SERPL: 0.9 {RATIO} (ref 1–2)
ALP LIVER SERPL-CCNC: 67 U/L
ALT SERPL-CCNC: 25 U/L
ANION GAP SERPL CALC-SCNC: 3 MMOL/L (ref 0–18)
AST SERPL-CCNC: 18 U/L (ref 15–37)
BASOPHILS # BLD AUTO: 0.02 X10(3) UL (ref 0–0.2)
BASOPHILS NFR BLD AUTO: 0.4 %
BILIRUB SERPL-MCNC: 0.6 MG/DL (ref 0.1–2)
BILIRUB UR QL STRIP.AUTO: NEGATIVE
BUN BLD-MCNC: 8 MG/DL (ref 9–23)
CALCIUM BLD-MCNC: 9.2 MG/DL (ref 8.5–10.1)
CHLORIDE SERPL-SCNC: 111 MMOL/L (ref 98–112)
CHOLEST SERPL-MCNC: 164 MG/DL (ref ?–200)
CLARITY UR REFRACT.AUTO: CLEAR
CO2 SERPL-SCNC: 26 MMOL/L (ref 21–32)
COLOR UR AUTO: YELLOW
CREAT BLD-MCNC: 0.8 MG/DL
DEPRECATED HBV CORE AB SER IA-ACNC: 46.2 NG/ML
EGFRCR SERPLBLD CKD-EPI 2021: 95 ML/MIN/1.73M2 (ref 60–?)
EOSINOPHIL # BLD AUTO: 0.06 X10(3) UL (ref 0–0.7)
EOSINOPHIL NFR BLD AUTO: 1.3 %
ERYTHROCYTE [DISTWIDTH] IN BLOOD BY AUTOMATED COUNT: 16.6 %
EST. AVERAGE GLUCOSE BLD GHB EST-MCNC: 117 MG/DL (ref 68–126)
FASTING PATIENT LIPID ANSWER: YES
FASTING STATUS PATIENT QL REPORTED: YES
GLOBULIN PLAS-MCNC: 3.7 G/DL (ref 2.8–4.4)
GLUCOSE BLD-MCNC: 100 MG/DL (ref 70–99)
GLUCOSE UR STRIP.AUTO-MCNC: NORMAL MG/DL
HBA1C MFR BLD: 5.7 % (ref ?–5.7)
HCT VFR BLD AUTO: 36 %
HDLC SERPL-MCNC: 43 MG/DL (ref 40–59)
HGB BLD-MCNC: 11.5 G/DL
IMM GRANULOCYTES # BLD AUTO: 0.02 X10(3) UL (ref 0–1)
IMM GRANULOCYTES NFR BLD: 0.4 %
IRON SATN MFR SERPL: 20 %
IRON SERPL-MCNC: 58 UG/DL
KETONES UR STRIP.AUTO-MCNC: NEGATIVE MG/DL
LDLC SERPL CALC-MCNC: 104 MG/DL (ref ?–100)
LEUKOCYTE ESTERASE UR QL STRIP.AUTO: NEGATIVE
LYMPHOCYTES # BLD AUTO: 1.58 X10(3) UL (ref 1–4)
LYMPHOCYTES NFR BLD AUTO: 32.9 %
MCH RBC QN AUTO: 25.6 PG (ref 26–34)
MCHC RBC AUTO-ENTMCNC: 31.9 G/DL (ref 31–37)
MCV RBC AUTO: 80 FL
MONOCYTES # BLD AUTO: 0.42 X10(3) UL (ref 0.1–1)
MONOCYTES NFR BLD AUTO: 8.8 %
NEUTROPHILS # BLD AUTO: 2.7 X10 (3) UL (ref 1.5–7.7)
NEUTROPHILS # BLD AUTO: 2.7 X10(3) UL (ref 1.5–7.7)
NEUTROPHILS NFR BLD AUTO: 56.2 %
NITRITE UR QL STRIP.AUTO: NEGATIVE
NONHDLC SERPL-MCNC: 121 MG/DL (ref ?–130)
OSMOLALITY SERPL CALC.SUM OF ELEC: 288 MOSM/KG (ref 275–295)
PH UR STRIP.AUTO: 6 [PH] (ref 5–8)
PLATELET # BLD AUTO: 211 10(3)UL (ref 150–450)
POTASSIUM SERPL-SCNC: 3.8 MMOL/L (ref 3.5–5.1)
PROT SERPL-MCNC: 7.2 G/DL (ref 6.4–8.2)
RBC # BLD AUTO: 4.5 X10(6)UL
RBC UR QL AUTO: NEGATIVE
SODIUM SERPL-SCNC: 140 MMOL/L (ref 136–145)
SP GR UR STRIP.AUTO: 1.02 (ref 1–1.03)
TIBC SERPL-MCNC: 286 UG/DL (ref 240–450)
TRANSFERRIN SERPL-MCNC: 192 MG/DL (ref 200–360)
TRIGL SERPL-MCNC: 88 MG/DL (ref 30–149)
TSI SER-ACNC: 1.8 MIU/ML (ref 0.36–3.74)
UROBILINOGEN UR STRIP.AUTO-MCNC: NORMAL MG/DL
VIT B12 SERPL-MCNC: 1058 PG/ML (ref 193–986)
VIT D+METAB SERPL-MCNC: 58.1 NG/ML (ref 30–100)
VLDLC SERPL CALC-MCNC: 15 MG/DL (ref 0–30)
WBC # BLD AUTO: 4.8 X10(3) UL (ref 4–11)

## 2024-02-03 PROCEDURE — 82728 ASSAY OF FERRITIN: CPT

## 2024-02-03 PROCEDURE — 83550 IRON BINDING TEST: CPT

## 2024-02-03 PROCEDURE — 81003 URINALYSIS AUTO W/O SCOPE: CPT

## 2024-02-03 PROCEDURE — 84443 ASSAY THYROID STIM HORMONE: CPT

## 2024-02-03 PROCEDURE — 80061 LIPID PANEL: CPT

## 2024-02-03 PROCEDURE — 80053 COMPREHEN METABOLIC PANEL: CPT

## 2024-02-03 PROCEDURE — 36415 COLL VENOUS BLD VENIPUNCTURE: CPT

## 2024-02-03 PROCEDURE — 85025 COMPLETE CBC W/AUTO DIFF WBC: CPT

## 2024-02-03 PROCEDURE — 82607 VITAMIN B-12: CPT

## 2024-02-03 PROCEDURE — 83540 ASSAY OF IRON: CPT

## 2024-02-03 PROCEDURE — 82306 VITAMIN D 25 HYDROXY: CPT

## 2024-02-03 PROCEDURE — 83036 HEMOGLOBIN GLYCOSYLATED A1C: CPT

## 2024-02-21 ENCOUNTER — OFFICE VISIT (OUTPATIENT)
Dept: INTERNAL MEDICINE CLINIC | Facility: CLINIC | Age: 39
End: 2024-02-21
Payer: COMMERCIAL

## 2024-02-21 VITALS
SYSTOLIC BLOOD PRESSURE: 126 MMHG | DIASTOLIC BLOOD PRESSURE: 82 MMHG | HEART RATE: 70 BPM | OXYGEN SATURATION: 99 % | TEMPERATURE: 97 F | RESPIRATION RATE: 16 BRPM | HEIGHT: 68 IN | WEIGHT: 246 LBS | BODY MASS INDEX: 37.28 KG/M2

## 2024-02-21 DIAGNOSIS — D21.9 LEIOMYOMA: ICD-10-CM

## 2024-02-21 DIAGNOSIS — Z12.31 ENCOUNTER FOR SCREENING MAMMOGRAM FOR BREAST CANCER: ICD-10-CM

## 2024-02-21 DIAGNOSIS — D64.9 NORMOCYTIC ANEMIA: ICD-10-CM

## 2024-02-21 DIAGNOSIS — Z01.419 WELL WOMAN EXAM WITH ROUTINE GYNECOLOGICAL EXAM: ICD-10-CM

## 2024-02-21 DIAGNOSIS — Z00.00 ROUTINE PHYSICAL EXAMINATION: Primary | ICD-10-CM

## 2024-02-21 DIAGNOSIS — Z98.84 S/P GASTRIC BYPASS: ICD-10-CM

## 2024-02-21 DIAGNOSIS — Z12.4 SCREENING FOR CERVICAL CANCER: ICD-10-CM

## 2024-02-21 PROBLEM — Z90.3 S/P GASTRIC SLEEVE PROCEDURE: Status: RESOLVED | Noted: 2024-01-17 | Resolved: 2024-02-21

## 2024-02-21 PROCEDURE — 88175 CYTOPATH C/V AUTO FLUID REDO: CPT | Performed by: PHYSICIAN ASSISTANT

## 2024-02-21 PROCEDURE — 90715 TDAP VACCINE 7 YRS/> IM: CPT | Performed by: PHYSICIAN ASSISTANT

## 2024-02-21 PROCEDURE — 90471 IMMUNIZATION ADMIN: CPT | Performed by: PHYSICIAN ASSISTANT

## 2024-02-21 PROCEDURE — 87624 HPV HI-RISK TYP POOLED RSLT: CPT | Performed by: PHYSICIAN ASSISTANT

## 2024-02-21 PROCEDURE — 99396 PREV VISIT EST AGE 40-64: CPT | Performed by: PHYSICIAN ASSISTANT

## 2024-02-21 RX ORDER — OMEPRAZOLE 20 MG/1
1 CAPSULE, DELAYED RELEASE ORAL
COMMUNITY

## 2024-02-21 RX ORDER — CALCIUM CARBONATE 500(1250)
600 TABLET ORAL
COMMUNITY

## 2024-02-21 NOTE — PROGRESS NOTES
Wellness Exam    CC: Patient is presenting for a wellness exam    HPI:   Concerns: uterine fibroids and polyps removed last year, still gets painful cramps, menses are regular, normal flow  IUD yrs ago was Mirena, removed dt migraines, did not improve without it. Does not want to go back on hormones at this time.    Now migraines managed with botox. Last botox was September, planning to hold off on it for now, not sure if it was helping. She uses ubrelvy for abortive therapy when severe migraine, which works; tylenol usually works. Takes tylenol every day x 2 mos    Gastric sleeve revision to bypass 12/2023, recovering well.     Diet is general, reintroducing foods s/p surgery, exercise: 30 minutes walking daily on treadmill.    Gyne:     Health Maintenance   Topic Date Due   • Pap Smear  02/01/2023         Denies pelvic pain, abnormal discharge or genital lesions.    Breast Cancer Screening: none prior   Health Maintenance   Topic Date Due   • Mammogram  Never done      Regular self breast exam: y.  Colon cancer screening:  No recommendations at this time       Pertinent Family History:   Family History   Problem Relation Age of Onset   • Hypertension Mother    • Lipids Mother    • Other (Other) Mother         BACK AND NECK PROBLEMS   • Allergies Brother    • High Cholesterol Brother    • Heart Surgery Maternal Grandmother         Bypass   • Glaucoma Maternal Grandmother    • Pacemaker Maternal Grandmother    • Heart Attack Maternal Grandfather 65   • Cancer Neg    • Diabetes Neg    • Macular degeneration Neg       Past Medical History:   Diagnosis Date   • Allergic rhinitis due to allergen 4/28/2015   • COVID-19 vaccine administered 04/28/2021   • Menstrual migraine without status migrainosus, not intractable 2/15/2019   • Migraines    • Multiple allergies 2013    Allergies: spt + t,rw,w,m,c,d,cr,f 2013     Past Surgical History:   Procedure Laterality Date   • LAP SLEEVE GASTRECTOMY     • OTHER SURGICAL HISTORY       trigger finger   • SINUS SURGERY        removal of tumor   • TONSILLECTOMY       Social History     Socioeconomic History   • Marital status: Single   Tobacco Use   • Smoking status: Never   • Smokeless tobacco: Never   Vaping Use   • Vaping Use: Never used   Substance and Sexual Activity   • Alcohol use: No   • Drug use: No   Other Topics Concern   • Caffeine Concern Yes     Comment: tea, 1 cup/week   • Exercise No     Current Outpatient Medications on File Prior to Visit   Medication Sig Dispense Refill   • omeprazole 20 MG Oral Capsule Delayed Release Place 1 Ring vaginally every 28 days.     • Calcium 500 MG Oral Tab Take 600 mg by mouth.     • Iron Combinations (IRON COMPLEX OR) Take by mouth.     • Multiple Vitamin (MULTI-DAY OR) Take by mouth.     • cholecalciferol 50 MCG (2000 UT) Oral Cap Take 1 capsule (2,000 Units total) by mouth daily. Once a week     • azelastine 0.1 % Nasal Solution 2 sprays by Nasal route 2 (two) times daily. 30 mL 5   • UBRELVY 100 MG Oral Tab Take 100 mg by mouth 2 (two) times daily as needed. Take one tablet at onset of migraine.  May take additional tablet in 2 hours if needed.  Do not exceed two tablets per 24 hour period. 10 tablet 11     No current facility-administered medications on file prior to visit.       Review of Systems   Constitutional: Negative for fever, chills and fatigue.   HENT: Negative for hearing loss, congestion, sore throat and neck pain.    Eyes: Negative for pain and visual disturbance.   Respiratory: Negative for cough and shortness of breath.    Cardiovascular: Negative for chest pain and palpitations.   Gastrointestinal: Negative for nausea, vomiting, abdominal pain and diarrhea.   Genitourinary: Negative for urgency, frequency of urination, and abnormal vaginal bleeding.   Musculoskeletal: Negative for arthralgias and gait problem.   Skin: Negative for color change and rash.   Neurological: Negative for tremors, weakness and numbness.    Hematological: Negative for adenopathy. Does not bruise/bleed easily.   Psychiatric/Behavioral: Negative for confusion and agitation. The patient is not nervous/anxious.      /82   Pulse 70   Temp 97.4 °F (36.3 °C)   Resp 16   Ht 5' 8\" (1.727 m)   Wt 246 lb (111.6 kg)   LMP 02/09/2024 (Exact Date)   SpO2 99%   BMI 37.40 kg/m²   Physical Exam   Constitutional: She is oriented to person, place, and time. She appears well-developed. No distress.    Head: Normocephalic and atraumatic.   Eyes: EOM are normal. Pupils are equal, round, and reactive to light. No scleral icterus.   ENT: TM's clear, nose normal, no oropharyngeal exudates or tonsillar hypertrophy    Neck: Normal range of motion. No thyromegaly present.   Cardiovascular: Normal rate, regular rhythm and normal heart sounds.  No murmur or friction rub heard.  Pulmonary/Chest: Effort normal and breath sounds normal bilaterally. She has no wheezes or rales.   Breasts:  Appearance symmetrical without dimpling or discharge.  No masses appreciated b/l.    Abdominal: Soft. Bowel sounds are normal. There is no tenderness. No HSM.  Musculoskeletal: Normal range of motion. She exhibits no edema.   Lymphadenopathy: She has no cervical, supraclavicular, or axillary adenopathy.   : External genitalia without atrophy or lesions.  Vaginal canal: no discharge or lesions.  Cervix: appears normal, soft, no CMT.  Uterus: no masses palpated, normal size, not tender.  Bladder: nontender, non-distended.  Adnexa: nontender, no masses palpated.    Neurological: She is alert and oriented to person, place, and time. DTRs are +2 and symmetric. Cranial nerves grossly intact.  Skin: Skin is warm. No rash noted. No erythema, pallor or jaundice.   Psychiatric: She has a normal mood and affect and her behavior is normal.     Assessment and Plan:  Jaz Long is a 38 year old female here for a wellness exam.  Age appropriate cancer screening, labs, safety, immunizations  were discussed with the patient and ordered as follows:  1. Routine physical examination (Primary)  2. Well woman exam with routine gynecological exam  3. Encounter for screening mammogram for breast cancer  -     [facility] PADDY 2D+3D SCREENING BILAT (CPT=77067/44914); Future; Expected date: 02/21/2024  4. Screening for cervical cancer  -     ThinPrep PAP Smear B; Future; Expected date: 02/21/2024  -     Hpv High Risk , Thin Prep Collect; Future; Expected date: 02/21/2024  5. S/P gastric bypass- recheck labs 3 mos  -     CBC With Differential With Platelet; Future; Expected date: 02/21/2024  -     Ferritin; Future; Expected date: 02/21/2024  -     Iron And Tibc; Future; Expected date: 02/21/2024  -     Vitamin B12; Future; Expected date: 02/21/2024  -     Folic Acid Serum (Folate); Future; Expected date: 02/21/2024  6. Normocytic anemia- recheck labs 3m  -     CBC With Differential With Platelet; Future; Expected date: 02/21/2024  -     Ferritin; Future; Expected date: 02/21/2024  -     Iron And Tibc; Future; Expected date: 02/21/2024  -     Vitamin B12; Future; Expected date: 02/21/2024  -     Folic Acid Serum (Folate); Future; Expected date: 02/21/2024  -     US PELVIS EV W DOPPLER (CPT=76856/23653/58532); Future; Expected date: 02/21/2024  7. Leiomyoma  -     US PELVIS EV W DOPPLER (CPT=76856/92564/32502); Future; Expected date: 02/21/2024  Other orders  -     TdaP (Adacel, Boostrix) [62255]       Discussed use of sunscreen, wearing seatbelt, recommend regular cardiovascular and weight bearing exercise as well as a well-rounded diet.    Return in about 1 year (around 2/21/2025) for routine physical, or sooner as needed.     Patient/Caregiver Education:  Patient/Caregiver Education: There are no barriers to learning. Medical education done.  Outcome: Patient verbalizes understanding.       Educated by: KYA

## 2024-02-22 LAB — HPV I/H RISK 1 DNA SPEC QL NAA+PROBE: NEGATIVE

## 2024-02-27 LAB
.: NORMAL
.: NORMAL

## 2024-03-02 ENCOUNTER — HOSPITAL ENCOUNTER (OUTPATIENT)
Dept: MAMMOGRAPHY | Age: 39
Discharge: HOME | End: 2024-03-02
Attending: PHYSICIAN ASSISTANT
Payer: COMMERCIAL

## 2024-03-02 DIAGNOSIS — Z12.31 ENCOUNTER FOR SCREENING MAMMOGRAM FOR BREAST CANCER: ICD-10-CM

## 2024-03-02 PROCEDURE — 77067 SCR MAMMO BI INCL CAD: CPT | Performed by: PHYSICIAN ASSISTANT

## 2024-03-02 PROCEDURE — 77063 BREAST TOMOSYNTHESIS BI: CPT | Performed by: PHYSICIAN ASSISTANT

## 2024-03-03 ENCOUNTER — HOSPITAL ENCOUNTER (OUTPATIENT)
Dept: ULTRASOUND IMAGING | Age: 39
End: 2024-03-03
Attending: PHYSICIAN ASSISTANT
Payer: COMMERCIAL

## 2024-03-03 ENCOUNTER — HOSPITAL ENCOUNTER (OUTPATIENT)
Dept: ULTRASOUND IMAGING | Age: 39
Discharge: HOME | End: 2024-03-03
Attending: PHYSICIAN ASSISTANT
Payer: COMMERCIAL

## 2024-03-03 DIAGNOSIS — D21.9 LEIOMYOMA: ICD-10-CM

## 2024-03-03 DIAGNOSIS — D64.9 NORMOCYTIC ANEMIA: ICD-10-CM

## 2024-03-03 PROCEDURE — 93975 VASCULAR STUDY: CPT | Performed by: PHYSICIAN ASSISTANT

## 2024-03-03 PROCEDURE — 76830 TRANSVAGINAL US NON-OB: CPT | Performed by: PHYSICIAN ASSISTANT

## 2024-03-03 PROCEDURE — 76856 US EXAM PELVIC COMPLETE: CPT | Performed by: PHYSICIAN ASSISTANT

## 2024-03-04 DIAGNOSIS — N83.202 LEFT OVARIAN CYST: Primary | ICD-10-CM

## 2024-03-04 NOTE — PROGRESS NOTES
Discussed results and recommendations with pt.  Understanding expressed.  She has appt in May for repeat labs already and will schedule ultrasound.  Order placed.

## 2024-03-08 NOTE — TELEPHONE ENCOUNTER
Spoke with patient, was in the [facility] ed for migraines on Saturday, states she has had on/off migraines since then with no relief.  Taking reglan prn, sumatriptan prn and topiramate 50 mg nightly with no relief, would like to know if there is anything els no

## 2024-04-27 ENCOUNTER — PATIENT MESSAGE (OUTPATIENT)
Dept: INTERNAL MEDICINE CLINIC | Facility: CLINIC | Age: 39
End: 2024-04-27

## 2024-04-27 DIAGNOSIS — G43.711 INTRACTABLE CHRONIC MIGRAINE WITHOUT AURA AND WITH STATUS MIGRAINOSUS: Primary | ICD-10-CM

## 2024-04-29 RX ORDER — UBROGEPANT 100 MG/1
100 TABLET ORAL DAILY PRN
Qty: 10 TABLET | Refills: 0 | Status: SHIPPED | OUTPATIENT
Start: 2024-04-29

## 2024-04-29 NOTE — TELEPHONE ENCOUNTER
KYA Limon: Ok to send ubrelvy 100mg prn for migraine. Repeat in 2 hrs if needed. Do not exceed 200mg in 24 hrs.

## 2024-04-29 NOTE — TELEPHONE ENCOUNTER
From: Jaz Long  To: Tianna Ricardo  Sent: 2024 2:52 PM CDT  Subject: Migraine medication     Good afternoon,   We discussed my migraines at my last appointment in February. I stopped getting the Botox injections and I am having an increase in headaches. I think I can manage these headaches better with Ubrelvy. I was previously taking 100mg, but my prescription has . Can you send a new prescription to my pharmacy or do I need to schedule an appointment?     Thanks,  Jaz Long

## 2024-05-11 ENCOUNTER — LAB ENCOUNTER (OUTPATIENT)
Dept: LAB | Age: 39
End: 2024-05-11
Attending: PHYSICIAN ASSISTANT
Payer: COMMERCIAL

## 2024-05-11 DIAGNOSIS — D64.9 NORMOCYTIC ANEMIA: ICD-10-CM

## 2024-05-11 DIAGNOSIS — Z98.84 S/P GASTRIC BYPASS: ICD-10-CM

## 2024-05-11 LAB
BASOPHILS # BLD AUTO: 0.01 X10(3) UL (ref 0–0.2)
BASOPHILS NFR BLD AUTO: 0.2 %
DEPRECATED HBV CORE AB SER IA-ACNC: 40.6 NG/ML
EOSINOPHIL # BLD AUTO: 0.06 X10(3) UL (ref 0–0.7)
EOSINOPHIL NFR BLD AUTO: 1.4 %
ERYTHROCYTE [DISTWIDTH] IN BLOOD BY AUTOMATED COUNT: 14.2 %
FOLATE SERPL-MCNC: 11.1 NG/ML (ref 8.7–?)
HCT VFR BLD AUTO: 34.9 %
HGB BLD-MCNC: 11.5 G/DL
IMM GRANULOCYTES # BLD AUTO: 0.01 X10(3) UL (ref 0–1)
IMM GRANULOCYTES NFR BLD: 0.2 %
IRON SATN MFR SERPL: 14 %
IRON SERPL-MCNC: 43 UG/DL
LYMPHOCYTES # BLD AUTO: 1.44 X10(3) UL (ref 1–4)
LYMPHOCYTES NFR BLD AUTO: 33.5 %
MCH RBC QN AUTO: 26.7 PG (ref 26–34)
MCHC RBC AUTO-ENTMCNC: 33 G/DL (ref 31–37)
MCV RBC AUTO: 81 FL
MONOCYTES # BLD AUTO: 0.36 X10(3) UL (ref 0.1–1)
MONOCYTES NFR BLD AUTO: 8.4 %
NEUTROPHILS # BLD AUTO: 2.42 X10 (3) UL (ref 1.5–7.7)
NEUTROPHILS # BLD AUTO: 2.42 X10(3) UL (ref 1.5–7.7)
NEUTROPHILS NFR BLD AUTO: 56.3 %
PLATELET # BLD AUTO: 192 10(3)UL (ref 150–450)
RBC # BLD AUTO: 4.31 X10(6)UL
TIBC SERPL-MCNC: 302 UG/DL (ref 240–450)
TRANSFERRIN SERPL-MCNC: 203 MG/DL (ref 200–360)
VIT B12 SERPL-MCNC: 722 PG/ML (ref 193–986)
WBC # BLD AUTO: 4.3 X10(3) UL (ref 4–11)

## 2024-05-11 PROCEDURE — 83550 IRON BINDING TEST: CPT

## 2024-05-11 PROCEDURE — 82746 ASSAY OF FOLIC ACID SERUM: CPT

## 2024-05-11 PROCEDURE — 36415 COLL VENOUS BLD VENIPUNCTURE: CPT

## 2024-05-11 PROCEDURE — 82607 VITAMIN B-12: CPT

## 2024-05-11 PROCEDURE — 83540 ASSAY OF IRON: CPT

## 2024-05-11 PROCEDURE — 82728 ASSAY OF FERRITIN: CPT

## 2024-05-11 PROCEDURE — 85025 COMPLETE CBC W/AUTO DIFF WBC: CPT

## 2024-05-13 DIAGNOSIS — Z98.84 S/P GASTRIC BYPASS: Primary | ICD-10-CM

## 2024-06-16 ENCOUNTER — HOSPITAL ENCOUNTER (OUTPATIENT)
Dept: ULTRASOUND IMAGING | Age: 39
End: 2024-06-16
Attending: PHYSICIAN ASSISTANT

## 2024-06-16 ENCOUNTER — HOSPITAL ENCOUNTER (OUTPATIENT)
Dept: ULTRASOUND IMAGING | Age: 39
Discharge: HOME | End: 2024-06-16
Attending: PHYSICIAN ASSISTANT

## 2024-06-16 DIAGNOSIS — N83.202 LEFT OVARIAN CYST: ICD-10-CM

## 2024-06-16 PROCEDURE — 76856 US EXAM PELVIC COMPLETE: CPT | Performed by: PHYSICIAN ASSISTANT

## 2024-06-16 PROCEDURE — 76830 TRANSVAGINAL US NON-OB: CPT | Performed by: PHYSICIAN ASSISTANT

## 2024-06-17 DIAGNOSIS — G43.711 INTRACTABLE CHRONIC MIGRAINE WITHOUT AURA AND WITH STATUS MIGRAINOSUS: ICD-10-CM

## 2024-06-17 RX ORDER — UBROGEPANT 100 MG/1
100 TABLET ORAL DAILY PRN
Qty: 10 TABLET | Refills: 0 | Status: SHIPPED | OUTPATIENT
Start: 2024-06-17

## 2024-06-17 NOTE — TELEPHONE ENCOUNTER
Last time medication was refilled 04/29/2024  Last office visit  02/21/2024  Next office visit due/scheduled No future appointments.    Please see patient message on refill request.    Medication not on protocol, routed to Braxton Ricardo Physician Assistant.

## 2024-07-02 ENCOUNTER — HOSPITAL ENCOUNTER (OUTPATIENT)
Age: 39
Discharge: HOME | End: 2024-07-02
Payer: COMMERCIAL

## 2024-07-02 VITALS
OXYGEN SATURATION: 99 % | DIASTOLIC BLOOD PRESSURE: 88 MMHG | HEIGHT: 68 IN | RESPIRATION RATE: 16 BRPM | BODY MASS INDEX: 31.83 KG/M2 | WEIGHT: 210 LBS | SYSTOLIC BLOOD PRESSURE: 138 MMHG | HEART RATE: 70 BPM | TEMPERATURE: 97 F

## 2024-07-02 DIAGNOSIS — B34.9 VIRAL SYNDROME: ICD-10-CM

## 2024-07-02 DIAGNOSIS — J02.9 SORE THROAT: Primary | ICD-10-CM

## 2024-07-02 LAB
S PYO AG THROAT QL: NEGATIVE
SARS-COV-2 RNA RESP QL NAA+PROBE: NOT DETECTED

## 2024-07-02 PROCEDURE — 99213 OFFICE O/P EST LOW 20 MIN: CPT | Performed by: NURSE PRACTITIONER

## 2024-07-02 PROCEDURE — U0002 COVID-19 LAB TEST NON-CDC: HCPCS | Performed by: NURSE PRACTITIONER

## 2024-07-02 PROCEDURE — A9150 MISC/EXPER NON-PRESCRIPT DRU: HCPCS | Performed by: NURSE PRACTITIONER

## 2024-07-02 PROCEDURE — 87880 STREP A ASSAY W/OPTIC: CPT | Performed by: NURSE PRACTITIONER

## 2024-07-02 RX ORDER — ACETAMINOPHEN 500 MG
1000 TABLET ORAL ONCE
Status: COMPLETED | OUTPATIENT
Start: 2024-07-02 | End: 2024-07-02

## 2024-07-02 NOTE — ED PROVIDER NOTES
Patient Seen in: [facility]      History     Chief Complaint   Patient presents with    Cough/URI    Sore Throat     Stated Complaint: sinus pressure, congestion, sore throat    Subjective:   HPI  38-year-old female presents to me care with complaints of sinus pain pressure congestion for the last 3 days.  Patient she woke up this morning had a sore throat.  No fever.  No recent sick contact.  Denies any difficulty in swallowing no headache dizziness or blurred vision.  Patient has no other issues complaints or concerns.  The patient's medication list, past medical history and social history elements as listed in today's nurse's notes were reviewed and agreed (except as otherwise stated in the HPI).  The patient's family history reviewed and determined to be noncontributory to the presenting problem.      Objective:   Past Medical History:    Allergic rhinitis    currently taking zyrtec & azelastine    Allergic rhinitis due to allergen    COVID-19 vaccine administered    Menstrual migraine without status migrainosus, not intractable    Migraines    Multiple allergies    Allergies: spt + t,rw,w,m,c,d,cr,f 2013    S/P gastric sleeve procedure              Past Surgical History:   Procedure Laterality Date    Gastric bypass,obesity,sb reconstruc  12/2023    Lap sleeve gastrectomy  2017    Other surgical history      trigger finger    Sinus surgery    2015    removal of tumor    Tonsillectomy                  No pertinent social history.            Review of Systems    Positive for stated Chief Complaint: Cough/URI and Sore Throat    Other systems are as noted in HPI.  Constitutional and vital signs reviewed.      All other systems reviewed and negative except as noted above.    Physical Exam     ED Triage Vitals [07/02/24 1150]   /88   Pulse 70   Resp 16   Temp 97.4 °F (36.3 °C)   Temp src Temporal   SpO2 99 %   O2 Device None (Room air)       Current Vitals:   Vital Signs  BP: 138/88  Pulse:  70  Resp: 16  Temp: 97.4 °F (36.3 °C)  Temp src: Temporal    Oxygen Therapy  SpO2: 99 %  O2 Device: None (Room air)            Physical Exam    GENERAL: The patient is well-developed well-nourished nontoxic, non-ill-appearing  HEENT: Normocephalic.  Atraumatic.  Extraocular motions are intact mild tenderness is noted to the frontal and maxillary sinus area, no sign of a peritonsillar abscess negative trismus tympanic membranes unremarkable  NECK: Supple.  No meningitic signs are noted.   CHEST/LUNGS: Clear to auscultation.  There is no respiratory distress noted.  HEART/CARDIOVASCULAR: Regular.  There is no tachycardia.   SKIN: There is no rash.  NEURO: The patient is awake, alert, and oriented.  The patient is cooperative.    ED Course     Labs Reviewed   POCT RAPID STREP - Normal   RAPID SARS-COV-2 BY PCR - Normal                      MDM   Pertinent Labs & Imaging studies reviewed. (See chart for details)  Differential diagnosis considered but not limited to: Viral syndrome viral URI COVID strep sinus infection  Patient coming in with sinus pain for 3 days with a sore throat. Patient provided with pain medication. Labs reviewed negative strep negative COVID. . Will treat for possible viral syndrome. Will discharge on over-the-counter supportive care see discharge note for instructions. Patient is comfortable with this plan.  Overall Pt looks good. Non-toxic, well-hydrated and in no respiratory distress. Vital signs are reassuring. Exam is reassuring. I do not believe pt  requires and additional  diagnostic studiesor intervention. I believe pt  can be discharged home to continue evaluation as an outpatient. Follow-up provider given. Discharge instructions given and reviewed. Return for any problems. All understand and agreewith the plan.    Please note that this report has been produced using speech recognition software and may contain errors related to that system including, but not limited to, errors in grammar,  punctuation, and spelling, as well as words and phrases that possibly may have been recognized inappropriately.  If there are any questions or concerns, contact the dictating provider for clarification.    Note to patient: The 21st Century Cures Act makes medical notes like these available to patients in the interest of transparency. However, this is a medical document intended as peer to peer communication. It is written in medical language and may contain abbreviations or verbiage that are unfamiliar. It may appear blunt or direct. Medical documents are intended to carry relevant information, facts as evident, and the clinical opinion of the practitioner.                                    Medical Decision Making      Disposition and Plan     Clinical Impression:  1. Sore throat    2. Viral syndrome         Disposition:  Discharge  7/2/2024 12:16 pm    Follow-up:  Man Davis MD  [address] [place] [id number] [phone]                Medications Prescribed:  Current Discharge Medication List

## 2024-07-29 DIAGNOSIS — G43.711 INTRACTABLE CHRONIC MIGRAINE WITHOUT AURA AND WITH STATUS MIGRAINOSUS: ICD-10-CM

## 2024-07-30 RX ORDER — UBROGEPANT 100 MG/1
100 TABLET ORAL DAILY PRN
Qty: 10 TABLET | Refills: 0 | Status: SHIPPED | OUTPATIENT
Start: 2024-07-30

## 2024-09-27 DIAGNOSIS — G43.711 INTRACTABLE CHRONIC MIGRAINE WITHOUT AURA AND WITH STATUS MIGRAINOSUS: ICD-10-CM

## 2024-09-27 RX ORDER — UBROGEPANT 100 MG/1
100 TABLET ORAL DAILY PRN
Qty: 10 TABLET | Refills: 0 | Status: SHIPPED | OUTPATIENT
Start: 2024-09-27

## 2024-09-27 NOTE — TELEPHONE ENCOUNTER
Last time medication was refilled 07/30/2024  Last office visit  02/21/2024  Next office visit due/scheduled No future appointments.    Medication not on protocol.

## 2024-10-22 NOTE — TELEPHONE ENCOUNTER
Last time medication was refilled 06/17/2024  Last office visit  02/21/2024  Next office visit due/scheduled No future appointments.      Patient comment: For continued migraines     Medication not on protocol.     n/a

## 2025-01-24 ENCOUNTER — APPOINTMENT (OUTPATIENT)
Dept: GENERAL RADIOLOGY | Age: 40
End: 2025-01-24
Attending: NURSE PRACTITIONER
Payer: COMMERCIAL

## 2025-01-24 ENCOUNTER — HOSPITAL ENCOUNTER (OUTPATIENT)
Age: 40
Discharge: HOME | End: 2025-01-24
Payer: COMMERCIAL

## 2025-01-24 VITALS
RESPIRATION RATE: 18 BRPM | SYSTOLIC BLOOD PRESSURE: 126 MMHG | HEART RATE: 68 BPM | BODY MASS INDEX: 29.4 KG/M2 | DIASTOLIC BLOOD PRESSURE: 84 MMHG | TEMPERATURE: 99 F | WEIGHT: 194 LBS | HEIGHT: 68 IN | OXYGEN SATURATION: 99 %

## 2025-01-24 DIAGNOSIS — M25.511 ACUTE PAIN OF RIGHT SHOULDER: Primary | ICD-10-CM

## 2025-01-24 PROCEDURE — 99213 OFFICE O/P EST LOW 20 MIN: CPT | Performed by: NURSE PRACTITIONER

## 2025-01-24 PROCEDURE — 73030 X-RAY EXAM OF SHOULDER: CPT | Performed by: NURSE PRACTITIONER

## 2025-01-24 NOTE — ED PROVIDER NOTES
Patient Seen in: [facility]      History     Chief Complaint   Patient presents with    Arm or Hand Injury     Stated Complaint: RT SHOULDER PAIN    Subjective:   39-year-old female presents to complaints of pain to the anterior and top of the right shoulder.  Symptoms started yesterday while doing housework and laundry.  States had difficulty sleeping last night.  Decreased range of motion due to pain.  No loss of  no numbness or weakness.  Pain does radiate down the arm.  Denies any other injuries or concerns.  The patient's medication list, past medical history and social history elements as listed in today's nurse's notes were reviewed and agreed (except as otherwise stated in the HPI).  The patient's family history reviewed and determined to be noncontributory to the presenting problem              Objective:     Past Medical History:    Allergic rhinitis    currently taking zyrtec & azelastine    Allergic rhinitis due to allergen    COVID-19 vaccine administered    Menstrual migraine without status migrainosus, not intractable    Migraines    Multiple allergies    Allergies: spt + t,rw,w,m,c,d,cr,f 2013    S/P gastric sleeve procedure              Past Surgical History:   Procedure Laterality Date    Gastric bypass,obesity,sb reconstruc  12/2023    Lap sleeve gastrectomy  2017    Other surgical history      trigger finger    Sinus surgery    2015    removal of tumor    Tonsillectomy                  No pertinent social history.            Review of Systems    Positive for stated complaint: RT SHOULDER PAIN  Other systems are as noted in HPI.  Constitutional and vital signs reviewed.      All other systems reviewed and negative except as noted above.    Physical Exam     ED Triage Vitals [01/24/25 1014]   /84   Pulse 68   Resp 18   Temp 98.5 °F (36.9 °C)   Temp src Oral   SpO2 99 %   O2 Device None (Room air)       Current Vitals:   Vital Signs  BP: 126/84  Pulse: 68  Resp: 18  Temp: 98.5  °F (36.9 °C)  Temp src: Oral    Oxygen Therapy  SpO2: 99 %  O2 Device: None (Room air)        Physical Exam  Vitals and nursing note reviewed.   Constitutional:       Appearance: Normal appearance.   HENT:      Head: Normocephalic.      Mouth/Throat:      Mouth: Mucous membranes are moist.   Cardiovascular:      Rate and Rhythm: Normal rate.   Pulmonary:      Effort: Pulmonary effort is normal.   Musculoskeletal:      Comments: Pain with palpation of the AC joint and anterior aspect of the right shoulder.  No gross deformities or swelling.  Does have decreased range of motion but normal strength.  CMS intact.   Skin:     General: Skin is warm and dry.   Neurological:      Mental Status: She is alert and oriented to person, place, and time.             ED Course   Labs Reviewed - No data to display           XR SHOULDER, COMPLETE (MIN 2 VIEWS), RIGHT (CPT=73030)    Result Date: 1/24/2025  PROCEDURE:  XR SHOULDER, COMPLETE (MIN 2 VIEWS), RIGHT (CPT=73030)  TECHNIQUE:  Multiple views were obtained.  COMPARISON:  None.  INDICATIONS:  RT SHOULDER PAIN  PATIENT STATED HISTORY: (As transcribed by Technologist)  The patient has a new onset of right shoulder pain while doing chores. Limited range of motion.    FINDINGS:  No acute fractures or osseous lesions are identified.  Glenohumeral relationship is within normal limits.  The visualized portion of the lungs are clear.             CONCLUSION:  No acute osseous findings.   LOCATION:  [facility]   Dictated by (CST): Yosef Mittal MD on 1/24/2025 at 10:48 AM     Finalized by (CST): Yosef Mittal MD on 1/24/2025 at 10:49 AM          MDM     Please note that this report has been produced using speech recognition software and may contain errors related to that system including, but not limited to, errors in grammar, punctuation, and spelling, as well as words and phrases that possibly may have been recognized inappropriately.  If there are any questions or concerns,  contact the dictating provider for clarification.              Medical Decision Making  Differential diagnosis includes but is not limited to: Shoulder-contusion, sprain, fracture, AC joint inflammation, bursitis      Presented today with history of pain to the right shoulder after doing laundry housework.  Did have decreased range of motion on exam.  CMS intact.  Pain over the AC joint.  X-ray of the shoulder showed no fracture or acute finding.  RICE instructions given.  To follow-up with orthopedics in 1 week if symptoms do not improve.  Patient verbalized understanding and agreed plan of care    Amount and/or Complexity of Data Reviewed  Radiology: ordered and independent interpretation performed. Decision-making details documented in ED Course.     Details: X-ray right shoulder    Risk  OTC drugs.        Disposition and Plan     Clinical Impression:  1. Acute pain of right shoulder         Disposition:  Discharge  1/24/2025 10:53 am    Follow-up:  Daren Lindsay PA  [address] [place] [id number] [phone]    In 1 week  As needed          Medications Prescribed:  Current Discharge Medication List              Supplementary Documentation:

## 2025-03-04 ENCOUNTER — LABORATORY ENCOUNTER (OUTPATIENT)
Dept: LAB | Age: 40
End: 2025-03-04
Attending: PHYSICIAN ASSISTANT
Payer: COMMERCIAL

## 2025-03-04 DIAGNOSIS — Z98.84 S/P GASTRIC BYPASS: ICD-10-CM

## 2025-03-04 LAB
ALBUMIN SERPL-MCNC: 4.6 G/DL (ref 3.2–4.8)
ALBUMIN/GLOB SERPL: 1.5 {RATIO} (ref 1–2)
ALP LIVER SERPL-CCNC: 72 U/L
ALT SERPL-CCNC: 26 U/L
ANION GAP SERPL CALC-SCNC: 7 MMOL/L (ref 0–18)
AST SERPL-CCNC: 21 U/L (ref ?–34)
BASOPHILS # BLD AUTO: 0.01 X10(3) UL (ref 0–0.2)
BASOPHILS NFR BLD AUTO: 0.2 %
BILIRUB SERPL-MCNC: 0.5 MG/DL (ref 0.3–1.2)
BUN BLD-MCNC: 11 MG/DL (ref 9–23)
CALCIUM BLD-MCNC: 9.7 MG/DL (ref 8.7–10.6)
CHLORIDE SERPL-SCNC: 103 MMOL/L (ref 98–112)
CO2 SERPL-SCNC: 30 MMOL/L (ref 21–32)
CREAT BLD-MCNC: 0.92 MG/DL
DEPRECATED HBV CORE AB SER IA-ACNC: 18 NG/ML
EGFRCR SERPLBLD CKD-EPI 2021: 80 ML/MIN/1.73M2 (ref 60–?)
EOSINOPHIL # BLD AUTO: 0.04 X10(3) UL (ref 0–0.7)
EOSINOPHIL NFR BLD AUTO: 0.9 %
ERYTHROCYTE [DISTWIDTH] IN BLOOD BY AUTOMATED COUNT: 14 %
FASTING STATUS PATIENT QL REPORTED: NO
GLOBULIN PLAS-MCNC: 3 G/DL (ref 2–3.5)
GLUCOSE BLD-MCNC: 82 MG/DL (ref 70–99)
HCT VFR BLD AUTO: 36.7 %
HGB BLD-MCNC: 11.8 G/DL
IMM GRANULOCYTES # BLD AUTO: 0 X10(3) UL (ref 0–1)
IMM GRANULOCYTES NFR BLD: 0 %
IRON SATN MFR SERPL: 20 %
IRON SERPL-MCNC: 69 UG/DL
LYMPHOCYTES # BLD AUTO: 1.68 X10(3) UL (ref 1–4)
LYMPHOCYTES NFR BLD AUTO: 39 %
MCH RBC QN AUTO: 26.8 PG (ref 26–34)
MCHC RBC AUTO-ENTMCNC: 32.2 G/DL (ref 31–37)
MCV RBC AUTO: 83.4 FL
MONOCYTES # BLD AUTO: 0.31 X10(3) UL (ref 0.1–1)
MONOCYTES NFR BLD AUTO: 7.2 %
NEUTROPHILS # BLD AUTO: 2.27 X10 (3) UL (ref 1.5–7.7)
NEUTROPHILS # BLD AUTO: 2.27 X10(3) UL (ref 1.5–7.7)
NEUTROPHILS NFR BLD AUTO: 52.7 %
OSMOLALITY SERPL CALC.SUM OF ELEC: 288 MOSM/KG (ref 275–295)
PLATELET # BLD AUTO: 226 10(3)UL (ref 150–450)
POTASSIUM SERPL-SCNC: 3.6 MMOL/L (ref 3.5–5.1)
PROT SERPL-MCNC: 7.6 G/DL (ref 5.7–8.2)
RBC # BLD AUTO: 4.4 X10(6)UL
SODIUM SERPL-SCNC: 140 MMOL/L (ref 136–145)
TOTAL IRON BINDING CAPACITY: 344 UG/DL (ref 250–425)
TRANSFERRIN SERPL-MCNC: 273 MG/DL (ref 250–380)
VIT B12 SERPL-MCNC: 499 PG/ML (ref 211–911)
VIT D+METAB SERPL-MCNC: 89.9 NG/ML (ref 30–100)
WBC # BLD AUTO: 4.3 X10(3) UL (ref 4–11)

## 2025-03-04 PROCEDURE — 82728 ASSAY OF FERRITIN: CPT

## 2025-03-04 PROCEDURE — 82607 VITAMIN B-12: CPT

## 2025-03-04 PROCEDURE — 84425 ASSAY OF VITAMIN B-1: CPT

## 2025-03-04 PROCEDURE — 85025 COMPLETE CBC W/AUTO DIFF WBC: CPT

## 2025-03-04 PROCEDURE — 82306 VITAMIN D 25 HYDROXY: CPT

## 2025-03-04 PROCEDURE — 83540 ASSAY OF IRON: CPT

## 2025-03-04 PROCEDURE — 80053 COMPREHEN METABOLIC PANEL: CPT

## 2025-03-04 PROCEDURE — 36415 COLL VENOUS BLD VENIPUNCTURE: CPT

## 2025-03-04 PROCEDURE — 83550 IRON BINDING TEST: CPT

## 2025-03-06 LAB — VITAMIN B1 WHOLE BLD: 102.1 NMOL/L

## 2025-03-10 ENCOUNTER — HOSPITAL ENCOUNTER (OUTPATIENT)
Dept: MAMMOGRAPHY | Age: 40
Discharge: HOME | End: 2025-03-10
Attending: PHYSICIAN ASSISTANT
Payer: COMMERCIAL

## 2025-03-10 DIAGNOSIS — Z12.31 ENCOUNTER FOR SCREENING MAMMOGRAM FOR BREAST CANCER: ICD-10-CM

## 2025-03-10 PROCEDURE — 77063 BREAST TOMOSYNTHESIS BI: CPT | Performed by: PHYSICIAN ASSISTANT

## 2025-03-10 PROCEDURE — 77067 SCR MAMMO BI INCL CAD: CPT | Performed by: PHYSICIAN ASSISTANT

## 2025-03-11 NOTE — PROGRESS NOTES
Written by Tianna Ricardo PA-C on 3/11/2025  6:24 AM CDT View Full Comments  Seen by patient Jaz Long on 3/11/2025  6:25 AM

## 2025-08-28 ENCOUNTER — OFFICE VISIT (OUTPATIENT)
Dept: INTERNAL MEDICINE CLINIC | Facility: CLINIC | Age: 40
End: 2025-08-28

## 2025-08-28 VITALS
WEIGHT: 204 LBS | OXYGEN SATURATION: 100 % | RESPIRATION RATE: 18 BRPM | SYSTOLIC BLOOD PRESSURE: 120 MMHG | TEMPERATURE: 97 F | HEIGHT: 68 IN | DIASTOLIC BLOOD PRESSURE: 74 MMHG | HEART RATE: 75 BPM | BODY MASS INDEX: 30.92 KG/M2

## 2025-08-28 DIAGNOSIS — Z00.00 ANNUAL PHYSICAL EXAM: ICD-10-CM

## 2025-08-28 DIAGNOSIS — R79.0 LOW FERRITIN: ICD-10-CM

## 2025-08-28 DIAGNOSIS — N76.4 ABSCESS OF LABIA MAJORA: Primary | ICD-10-CM

## 2025-08-28 PROCEDURE — 99396 PREV VISIT EST AGE 40-64: CPT | Performed by: NURSE PRACTITIONER

## 2025-08-28 PROCEDURE — 99213 OFFICE O/P EST LOW 20 MIN: CPT | Performed by: NURSE PRACTITIONER

## 2025-08-28 RX ORDER — SULFAMETHOXAZOLE AND TRIMETHOPRIM 800; 160 MG/1; MG/1
1 TABLET ORAL 2 TIMES DAILY
Qty: 10 TABLET | Refills: 0 | Status: SHIPPED | OUTPATIENT
Start: 2025-08-28 | End: 2025-09-02

## (undated) DEVICE — SUTURE STRATAFIX PDS + 2-0 SH SPRL 15CM ABS VIOL

## (undated) DEVICE — POSITIONER PSTN 9IN DONUT HEAD FOAM

## (undated) DEVICE — GLOVE SURG 7.5 PROTEXIS PI MIC LF CRM PF SMTH BEAD CUFF STRL

## (undated) DEVICE — STAPLER PWR PWR CNTRL SHELL SIGNIA

## (undated) DEVICE — SUTURE VICRYL 2-0 SH 27IN BRAID COAT ABS VIOL J317H

## (undated) DEVICE — DISSECTOR LAPSCP 48CM SONICISION CRV JAW CRDLS US

## (undated) DEVICE — Device

## (undated) DEVICE — STANDARD HYPODERMIC NEEDLE,POLYPROPYLENE HUB: Brand: MONOJECT

## (undated) DEVICE — SYRINGE 20ML GRAD STRL MED DISP LL

## (undated) DEVICE — TROCAR LAPSCP 100MM 15MM EPTH XCEL STAB SLV BLDLS OBT OPTC

## (undated) DEVICE — TETRA-FLEX CF WOVEN LATEX FREE ELASTIC BANDAGE 2" X 5.5 YD: Brand: TETRA-FLEX™CF

## (undated) DEVICE — TOWEL OR BLU 16X26IN 4PK

## (undated) DEVICE — SUTURE MONOCRYL MTPS 4-0 PS2 18IN MONO ABS UNDYED

## (undated) DEVICE — IRRIGATOR SCT STRKFL 2 TIP STRL LF DISP

## (undated) DEVICE — DRAIN INCS FLAT 20CMX10MM SIL RADOPQ .75 PRFR HBLS STRL LF

## (undated) DEVICE — 1010 S-DRAPE TOWEL DRAPE 10/BX: Brand: STERI-DRAPE™

## (undated) DEVICE — DRAPE SHT FNFLD 71X40IN MED SURG CNVRT STRL LF DISP TIBURON

## (undated) DEVICE — POSITIONER OR ULN NRV FOAM CONVOLUTE

## (undated) DEVICE — ENCORE® LATEX ACCLAIM SIZE 6.5, STERILE LATEX POWDER-FREE SURGICAL GLOVE: Brand: ENCORE

## (undated) DEVICE — TROCAR LAPSCP 150MM 5MM EPTH XCEL STAB SLV BLDLS OBT OPTC

## (undated) DEVICE — RELOAD SUT AS 2-0 6IN NABSB BARB LOOP 5-LOC PLYBTSTR STRL

## (undated) DEVICE — GOWN SURG XL L3 NONREINFORCE SET IN SLV STRL LF DISP BLUE

## (undated) DEVICE — SOL  .9 1000ML BTL

## (undated) DEVICE — GLOVE SURG 7 PROTEXIS LF BLUE PF SMTH BEAD CUFF INTLK STRL

## (undated) DEVICE — CRADLE PSTN DEVON ARM FOAM LMI LF

## (undated) DEVICE — SUTURE PROLENE 4-0 PS-2

## (undated) DEVICE — SUTURE VICRYL 0 STD SHORT 54IN BRAID TIES COAT ABS UNDYED J608H

## (undated) DEVICE — SYSTEM IMG CLEARIFY 8X6IN WARM HUB TROCAR WIPE MRFBR DISP

## (undated) DEVICE — GLOVE SURG 7.5 PROTEXIS LF BLUE PF SMTH BEAD CUFF INTLK STRL

## (undated) DEVICE — GLOVE SURG 7 PROTEXIS PI MIC LF CRM PF SMTH BEAD CUFF STRL

## (undated) DEVICE — PORT HAND ACC 120MM 2 WALL CANNULA BLDLS OPTC TIP LOWPRFL

## (undated) DEVICE — INSERT SCSR METZ CRV 45CM 5MM 2 ACT DISP SP95

## (undated) DEVICE — GLOVE SURG 6.5 PROTEXIS PI MIC LF CRM PF SMTH BEAD CUFF STRL

## (undated) DEVICE — UPPER EXTREMITY: Brand: MEDLINE INDUSTRIES, INC.

## (undated) DEVICE — ADHESIVE DRMBND MINI .36ML LIQUID APL MCBL BRR 2 OCTYL

## (undated) DEVICE — SET TBG 3 LUM FLTR AIRSEAL STRL LF ACT CHRCL DISP

## (undated) DEVICE — SUTURE ETHILON MTPS 3-0 PS1 18IN MONO NABSB BLK 1663H

## (undated) DEVICE — BULB 100CC SIL DRN LTWT LOW LVL SCT WND DRN STRL LF DISP

## (undated) DEVICE — SUTURE VICRYL 3-0 SH 27IN BRAID COAT ABS VIOL J316H

## (undated) DEVICE — ZIMMER® STERILE DISPOSABLE TOURNIQUET CUFF WITH PLC, DUAL PORT, DUAL BLADDER, 18 IN. (46 CM)

## (undated) NOTE — ED AVS SNAPSHOT
Keith Maurer   MRN: [id number]    Department:  [facility] Emergency Department   Date of Visit:  3/23/2019           Disclosure     Insurance plans vary and the physician(s) referred by the ER may not be covered by your plan.  Please contact y CARE PHYSICIAN AT ONCE OR RETURN IMMEDIATELY TO THE EMERGENCY DEPARTMENT. If you have been prescribed any medication(s), please fill your prescription right away and begin taking the medication(s) as directed.   If you believe that any of the medications

## (undated) NOTE — LETTER
AUTHORIZATION FOR SURGICAL OPERATION OR OTHER PROCEDURE    1. I hereby authorize Dr. Doris Fish and the [facility] staff assigned to my case to perform the following operation and/or procedure at the [facility]:    ______________________________BOTOX 200 UNITS ________________________________________________      _______________________________________________________________________________________________    2. My physician has explained the nature and purpose of the operation or other procedure, possible alternative methods of treatment, the risks involved, and the possibility of complication to me. I acknowledge that no guarantee has been made as to the result that may be obtained. 3.  I recognize that, during the course of this operation, or other procedure, unforseen conditions may necessitate additional or different procedure than those listed above. I, therefore, further authorize and request that the above named physician, his/her physician assistants or designees perform such procedures as are, in his/her professional opinion, necessary and desirable. 4.  Any tissue or organs removed in the operation or other procedure may be disposed of by and at the discretion of the [facility] staff and [facility]. 5.  I understand that in the event of a medical emergency, I will be transported by local paramedics to [facility] or other hospital emergency department. 6.  I certify that I have read and fully understand the above consent to operation and/or other procedure. 7.  I acknowledge that my physician has explained sedation/analgesia administration to me including the risks and benefits. I consent to the administration of sedation/analgesia as may be necessary or desirable in the judgement of my physician. Witness signature: ___________________________________________________ Date:  ______/______/_____                    Time:  ________ A. M.  P.M.        Patient Name: Aquiles Long________________________________________  (please print)       Patient signature:  ___________________________________________________             Relationship to Patient:           []  Parent    Responsible person                          []  Spouse  In case of minor or                    [] Other  _____________   Incompetent name:  __________________________________________________                               (please print)      _____________      Responsible person  In case of minor or  Incompetent signature:  _______________________________________________    Statement of Physician  My signature below affirms that prior to the time of the procedure, I have explained to the patient and/or his/her guardian, the risks and benefits involved in the proposed treatment and any reasonable alternative to the proposed treatment. I have also explained the risks and benefits involved in the refusal of the proposed treatment and have answered the patient's questions.                         Date:  ______/______/_______  Provider                      Signature:  __________________________________________________________       Time:  ___________ A.M    P.M.

## (undated) NOTE — LETTER
Date: 2023      Patient Name: [place] Andino      : 1985        Thank you for choosing [facility] as your health care provider. Your physician has deemed the following medical service(s) necessary. However, your insurance plan may not pay for all of your health care and costs and may deny payment for this service. The fact that your insurance plan does not pay for an item or service does not mean you should not receive it. The purpose of this form is to help you make an informed decision about whether or not you want to receive this service(s) that may not be paid for by your insurance plan. CPT Code Description     Cost     _________ _BOTOX 200 UNITS ____________  _$4000_____      _________ ______________________________ _____________      _________ ______________________________ _____________      I understand that the above mentioned service(s) or supply may not be covered by my insurance company.  I agree to be financially responsible for the cost of this service or supply in the event of my insurance denies payment as a non-covered benefit.        ______________________________________________________________________  Signature of Patient or Patient's Representative  Relationship  Date    ______________________________________________________________________  Signature of Witness to signing of form   Printed Name

## (undated) NOTE — LETTER
AUTHORIZATION FOR SURGICAL OPERATION OR OTHER PROCEDURE    1. I hereby authorize Dr. Sindhu Christopher and the [facility] staff assigned to my case to perform the following operation and/or procedure at the [facility]:     Botox 200 units    2.   My physician has e Responsible person                          []  Spouse  In case of minor or                    [] Other  _____________   Incompetent name:  __________________________________________________                               (please print)      _____________

## (undated) NOTE — LETTER
AUTHORIZATION FOR SURGICAL OPERATION OR OTHER PROCEDURE    1. I hereby authorize Dr. Beto Crowley and the [facility] staff assigned to my case to perform the following operation and/or procedure at the [facility]:    botox injection   _______________________________________________________________________________________________      _______________________________________________________________________________________________    2. My physician has explained the nature and purpose of the operation or other procedure, possible alternative methods of treatment, the risks involved, and the possibility of complication to me. I acknowledge that no guarantee has been made as to the result that may be obtained. 3.  I recognize that, during the course of this operation, or other procedure, unforseen conditions may necessitate additional or different procedure than those listed above. I, therefore, further authorize and request that the above named physician, his/her physician assistants or designees perform such procedures as are, in his/her professional opinion, necessary and desirable. 4.  Any tissue or organs removed in the operation or other procedure may be disposed of by and at the discretion of the [facility] staff and [facility]. 5.  I understand that in the event of a medical emergency, I will be transported by local paramedics to [facility] or other [facility] emergency department. 6.  I certify that I have read and fully understand the above consent to operation and/or other procedure. 7.  I acknowledge that my physician has explained sedation/analgesia administration to me including the risks and benefits. I consent to the administration of sedation/analgesia as may be necessary or desirable in the judgement of my physician. Witness signature: ___________________________________________________ Date:  ______/______/_____                    Time:  ________ A. M.  P.M. Patient Name:  __Jaz Long____________________________________________________  (please print)       Patient signature:  ___________________________________________________             Relationship to Patient:           []  Parent    Responsible person                          []  Spouse  In case of minor or                    [] Other  _____________   Incompetent name:  __________________________________________________                               (please print)      _____________      Responsible person  In case of minor or  Incompetent signature:  _______________________________________________    Statement of Physician  My signature below affirms that prior to the time of the procedure, I have explained to the patient and/or his/her guardian, the risks and benefits involved in the proposed treatment and any reasonable alternative to the proposed treatment. I have also explained the risks and benefits involved in the refusal of the proposed treatment and have answered the patient's questions.                         Date:  ______/______/_______  Provider                      Signature:  __________________________________________________________       Time:  ___________ A.M    P.M.

## (undated) NOTE — LETTER
Date: 2023      Patient Name: Sona Boss      : 1985        Thank you for choosing [id number] as your health care provider. Your physician has deemed the following medical service(s) necessary. However, your insurance plan may not pay for all of your health care and costs and may deny payment for this service. The fact that your insurance plan does not pay for an item or service does not mean you should not receive it. The purpose of this form is to help you make an informed decision about whether or not you want to receive this service(s) that may not be paid for by your insurance plan. CPT Code Description     Cost     _________ _Botox_____________________________  $4000      _________ ______________________________ _____________      _________ ______________________________ _____________      I understand that the above mentioned service(s) or supply may not be covered by my insurance company.  I agree to be financially responsible for the cost of this service or supply in the event of my insurance denies payment as a non-covered benefit.        ______________________________________________________________________  Signature of Patient or Patient's Representative  Relationship  Date    ______________________________________________________________________  Signature of Witness to signing of form   Printed Name

## (undated) NOTE — LETTER
AUTHORIZATION FOR SURGICAL OPERATION OR OTHER PROCEDURE    1. I hereby authorize Dr. Beto Crowley and the [facility] staff assigned to my case to perform the following operation and/or procedure at the [facility]:    Botox injections  _______________________________________________________________________________________________    2. My physician has explained the nature and purpose of the operation or other procedure, possible alternative methods of treatment, the risks involved, and the possibility of complication to me. I acknowledge that no guarantee has been made as to the result that may be obtained. 3.  I recognize that, during the course of this operation, or other procedure, unforseen conditions may necessitate additional or different procedure than those listed above. I, therefore, further authorize and request that the above named physician, his/her physician assistants or designees perform such procedures as are, in his/her professional opinion, necessary and desirable. 4.  Any tissue or organs removed in the operation or other procedure may be disposed of by and at the discretion of the [facility] staff and [facility] AT [facility]. 5.  I understand that in the event of a medical emergency, I will be transported by local paramedics to [facility] or other hospital emergency department. 6.  I certify that I have read and fully understand the above consent to operation and/or other procedure. 7.  I acknowledge that my physician has explained sedation/analgesia administration to me including the risks and benefits. I consent to the administration of sedation/analgesia as may be necessary or desirable in the judgement of my physician. Witness signature: ___________________________________________________ Date:  ______/______/_____                    Time:  ________ A. M.  P.M.        Patient Name:  Sigifredo Roberts  (please print)       Patient signature: ___________________________________________________             Relationship to Patient:           []  Parent    Responsible person                          []  Spouse  In case of minor or                    [] Other  _____________   Incompetent name:  __________________________________________________                               (please print)      _____________      Responsible person  In case of minor or  Incompetent signature:  _______________________________________________    Statement of Physician  My signature below affirms that prior to the time of the procedure, I have explained to the patient and/or his/her guardian, the risks and benefits involved in the proposed treatment and any reasonable alternative to the proposed treatment. I have also explained the risks and benefits involved in the refusal of the proposed treatment and have answered the patient's questions.                         Date:  ______/______/_______  Provider                      Signature:  __________________________________________________________       Time:  ___________ A.M    P.M.

## (undated) NOTE — LETTER
AUTHORIZATION FOR SURGICAL OPERATION OR OTHER PROCEDURE    1. I hereby authorize Dr. Jinny Phalen and the [facility] staff assigned to my case to perform the following operation and/or procedure at the [facility] Office:    botox injection   _______________________________________________________________________________________________      _______________________________________________________________________________________________    2. My physician has explained the nature and purpose of the operation or other procedure, possible alternative methods of treatment, the risks involved, and the possibility of complication to me. I acknowledge that no guarantee has been made as to the result that may be obtained. 3.  I recognize that, during the course of this operation, or other procedure, unforseen conditions may necessitate additional or different procedure than those listed above. I, therefore, further authorize and request that the above named physician, his/her physician assistants or designees perform such procedures as are, in his/her professional opinion, necessary and desirable. 4.  Any tissue or organs removed in the operation or other procedure may be disposed of by and at the discretion of the [facility] staff and [facility] AT [facility]. 5.  I understand that in the event of a medical emergency, I will be transported by local paramedics to [facility] or other [facility] emergency department. 6.  I certify that I have read and fully understand the above consent to operation and/or other procedure. 7.  I acknowledge that my physician has explained sedation/analgesia administration to me including the risks and benefits. I consent to the administration of sedation/analgesia as may be necessary or desirable in the judgement of my physician. Witness signature: ___________________________________________________ Date:  ______/______/_____                    Time:  ________ A. M.  P.M. Patient Name:  ___Jaz Zelayack___________________________________________________  (please print)       Patient signature:  ___________________________________________________             Relationship to Patient:           []  Parent    Responsible person                          []  Spouse  In case of minor or                    [] Other  _____________   Incompetent name:  __________________________________________________                               (please print)      _____________      Responsible person  In case of minor or  Incompetent signature:  _______________________________________________    Statement of Physician  My signature below affirms that prior to the time of the procedure, I have explained to the patient and/or his/her guardian, the risks and benefits involved in the proposed treatment and any reasonable alternative to the proposed treatment. I have also explained the risks and benefits involved in the refusal of the proposed treatment and have answered the patient's questions.                         Date:  ______/______/_______  Provider                      Signature:  __________________________________________________________       Time:  ___________ A.M    P.M.

## (undated) NOTE — LETTER
Date: 2023      Patient Name: Ivory Acevedo      : 1985        Thank you for choosing  [facility] as your health care provider. Your physician has deemed the following medical service(s) necessary. However, your insurance plan may not pay for all of your health care and costs and may deny payment for this service. The fact that your insurance plan does not pay for an item or service does not mean you should not receive it. The purpose of this form is to help you make an informed decision about whether or not you want to receive this service(s) that may not be paid for by your insurance plan. CPT Code Description     Cost     _________ _botox_______________________  $4000_____________      _________ ______________________________ _____________      _________ ______________________________ _____________      I understand that the above mentioned service(s) or supply may not be covered by my insurance company.  I agree to be financially responsible for the cost of this service or supply in the event of my insurance denies payment as a non-covered benefit.        ______________________________________________________________________  Signature of Patient or Patient's Representative  Relationship  Date    ______________________________________________________________________  Signature of Witness to signing of form   Printed Name

## (undated) NOTE — LETTER
AUTHORIZATION FOR SURGICAL OPERATION OR OTHER PROCEDURE    1.  I hereby authorize Dr. Nicole Chaidez, and [facility] staff assigned to my case to perform the following operation and/or procedure at the [facility]:  IUD REMOVAL  ________________________ Patient Name:  ______________________________________________________  (please print)      Patient signature:  ___________________________________________________             Relationship to Patient:           []  Parent    Responsible person

## (undated) NOTE — LETTER
Date: 2022      Patient Name: Kierra Law      : 1985        Thank you for choosing [facility] as your health care provider. Your physician has deemed the following medical service(s) necessary. However, your insurance plan may not pay for all of your health care and costs and may deny payment for this service. The fact that your insurance plan does not pay for an item or service does not mean you should not receive it. The purpose of this form is to help you make an informed decision about whether or not you want to receive this service(s) that may not be paid for by your insurance plan. CPT Code Description     Cost     _________ ____botox injection_________________  __$4000___________      _________ ______________________________ _____________      _________ ______________________________ _____________      I understand that the above mentioned service(s) or supply may not be covered by my insurance company.  I agree to be financially responsible for the cost of this service or supply in the event of my insurance denies payment as a non-covered benefit.        ______________________________________________________________________  Signature of Patient or Patient's Representative  Relationship  Date    ______________________________________________________________________  Signature of Witness to signing of form   Printed Name

## (undated) NOTE — LETTER
AUTHORIZATION FOR SURGICAL OPERATION OR OTHER PROCEDURE    1.  I hereby authorize Dr. Elia Viramontes and the [facility] staff assigned to my case to perform the following operation and/or procedure at the [facility]:    Botox Injection __________________________ Shanell Colunga  (please print)       Patient signature:  ___________________________________________________             Relationship to Patient:           []  Parent    Responsible person                          []  Spouse  In case of minor or

## (undated) NOTE — ED AVS SNAPSHOT
Bernice Moran   MRN: [id number]    Department:  [facility] Emergency Department   Date of Visit:  7/29/2018           Disclosure     Insurance plans vary and the physician(s) referred by the ER may not be covered by your plan.  Please contact y CARE PHYSICIAN AT ONCE OR RETURN IMMEDIATELY TO THE EMERGENCY DEPARTMENT. If you have been prescribed any medication(s), please fill your prescription right away and begin taking the medication(s) as directed.   If you believe that any of the medications

## (undated) NOTE — LETTER
January 8, 2020    Omari Ryan, [address] [place] [address]     Patient: Damon Robles   YOB: 1985   Date of Visit: 1/8/2020       Dear Dr. Scottie Schaefer MD:    Thank you for referring Kt Gosselin to me for evaluation.  Here is my a Alcohol use: No    Drug use: No      Medications:  Current Outpatient Medications   Medication Sig Dispense Refill   • valACYclovir HCl 1 G Oral Tab      • Cholecalciferol (VITAMIN D3) 35262 units Oral Cap Take 1 capsule by mouth once a week.  0   • CHERISE Right Left    Dist sc 20/20 20/20          Pupils       Pupils    Right PERRL    Left PERRL          Neuro/Psych     Oriented x3:   Yes            Slit Lamp and Fundus Exam     External Exam       Right Left    External Normal healing vesicle temporal by

## (undated) NOTE — MR AVS SNAPSHOT
After Visit Summary   2/1/2020    Charlie Wooten    MRN: [id number]           Visit Information     Date & Time  2/1/2020  9:20 AM Provider  Perfecto Arroyo MD [address] [place] [address], [address] [place] [address],[address], [facility]  Phone  [phone] THINPREP PAP SMEAR ONLY [[id number] CUSTOM]     Future Labs/Procedures Expected by Expires    HPV HIGH RISK , THIN PREP COLLECTION [[id number] CUSTOM]  2/1/2020 2/1/2021    THINPREP PAP SMEAR B [[id number] CUSTOM]  2/1/2020 2/1/2021                DM now offers V non-life-threatening.   Also available by appointment     Average cost  $70*   [address]   Monday – Friday  10:00 am – 10:00 pm   Saturday – Sunday  10:00 am – 4:00 pm     Tin

## (undated) NOTE — LETTER
Date: September 15, 2022      Patient Name: Sigifredo Roberts      : 1985        Thank you for choosing [facility] as your health care provider. Your physician has deemed the following medical service(s) necessary. However, your insurance plan may not pay for all of your health care and costs and may deny payment for this service. The fact that your insurance plan does not pay for an item or service does not mean you should not receive it. The purpose of this form is to help you make an informed decision about whether or not you want to receive this service(s) that may not be paid for by your insurance plan. CPT Code Description     Cost     _________ __botox______________________  __$4,000___________      _________ ______________________________ _____________      _________ ______________________________ _____________      I understand that the above mentioned service(s) or supply may not be covered by my insurance company.  I agree to be financially responsible for the cost of this service or supply in the event of my insurance denies payment as a non-covered benefit.        ______________________________________________________________________  Signature of Patient or Patient's Representative  Relationship  Date    ______________________________________________________________________  Signature of Witness to signing of form   Printed Name

## (undated) NOTE — LETTER
Date: 2022      Patient Name: Slava Call      : 1985        Thank you for choosing  [facility] as your health care provider. Your physician has deemed the following medical service(s) necessary. However, your insurance plan may not pay for all of your health care and costs and may deny payment for this service. The fact that your insurance plan does not pay for an item or service does not mean you should not receive it. The purpose of this form is to help you make an informed decision about whether or not you want to receive this service(s) that may not be paid for by your insurance plan. CPT Code Description     Cost     _________ Botox Injections     $3,000.00    _________ ______________________________ _____________      _________ ______________________________ _____________      I understand that the above mentioned service(s) or supply may not be covered by my insurance company.  I agree to be financially responsible for the cost of this service or supply in the event of my insurance denies payment as a non-covered benefit.        ______________________________________________________________________  Signature of Patient or Patient's Representative  Relationship  Date    ______________________________________________________________________  Signature of Witness to signing of form   Printed Name

## (undated) NOTE — LETTER
11/26/2018          To Whom It May Concern:    Luiz Aguilar is currently under my medical care. Surgery planned for 12/11/18.   May have off up to 2 weeks for postoperative pain and recovery  Follow-up 2 weeks postop  May return to light duty as tolerate

## (undated) NOTE — LETTER
AUTHORIZATION FOR SURGICAL OPERATION OR OTHER PROCEDURE    1. I hereby authorize  and the [facility] staff assigned to my case to perform the following operation and/or procedure at the [facility] Office:    BOTOX_________________________________________________________________________________________      _______________________________________________________________________________________________    2. My physician has explained the nature and purpose of the operation or other procedure, possible alternative methods of treatment, the risks involved, and the possibility of complication to me. I acknowledge that no guarantee has been made as to the result that may be obtained. 3.  I recognize that, during the course of this operation, or other procedure, unforseen conditions may necessitate additional or different procedure than those listed above. I, therefore, further authorize and request that the above named physician, his/her physician assistants or designees perform such procedures as are, in his/her professional opinion, necessary and desirable. 4.  Any tissue or organs removed in the operation or other procedure may be disposed of by and at the discretion of the [facility] staff and [facility] AT [facility]. 5.  I understand that in the event of a medical emergency, I will be transported by local paramedics to [facility] or other hospital emergency department. 6.  I certify that I have read and fully understand the above consent to operation and/or other procedure. 7.  I acknowledge that my physician has explained sedation/analgesia administration to me including the risks and benefits. I consent to the administration of sedation/analgesia as may be necessary or desirable in the judgement of my physician. Witness signature: ___________________________________________________ Date:  ______/______/_____                    Time:  ________ A. M.  P.M.        Patient Name: Rafael Scanlon  (please print)       Patient signature:  ___________________________________________________             Relationship to Patient:           []  Parent    Responsible person                          []  Spouse  In case of minor or                    [] Other  _____________   Incompetent name:  __________________________________________________                               (please print)      _____________      Responsible person  In case of minor or  Incompetent signature:  _______________________________________________    Statement of Physician  My signature below affirms that prior to the time of the procedure, I have explained to the patient and/or his/her guardian, the risks and benefits involved in the proposed treatment and any reasonable alternative to the proposed treatment. I have also explained the risks and benefits involved in the refusal of the proposed treatment and have answered the patient's questions.                         Date:  ______/______/_______  Provider                      Signature:  __________________________________________________________       Time:  ___________ A.M    P.M.

## (undated) NOTE — ED AVS SNAPSHOT
Jocelyne Rodriguez   MRN: [id number]    Department:  [facility] Emergency Department   Date of Visit:  10/5/2017           Disclosure     Insurance plans vary and the physician(s) referred by the ER may not be covered by your plan.  Please contact y CARE PHYSICIAN AT ONCE OR RETURN IMMEDIATELY TO THE EMERGENCY DEPARTMENT. If you have been prescribed any medication(s), please fill your prescription right away and begin taking the medication(s) as directed.   If you believe that any of the medications

## (undated) NOTE — LETTER
AUTHORIZATION FOR SURGICAL OPERATION OR OTHER PROCEDURE    1.  I hereby authorize Dr Saintclair Saltness and the [facility] staff assigned to my case to perform the following operation and/or procedure at the [facility] Office:    __________Botox Injection _________________ Name:  _____Tamika Brock______________________________________________  (please print)       Patient signature:  ___________________________________________________             Relationship to Patient:           []  Parent    Responsible person

## (undated) NOTE — ED AVS SNAPSHOT
Damon Robles   MRN: [id number]    Department:  [facility] Emergency Department   Date of Visit:  2/22/2019           Disclosure     Insurance plans vary and the physician(s) referred by the ER may not be covered by your plan.  Please contact y CARE PHYSICIAN AT ONCE OR RETURN IMMEDIATELY TO THE EMERGENCY DEPARTMENT. If you have been prescribed any medication(s), please fill your prescription right away and begin taking the medication(s) as directed.   If you believe that any of the medications

## (undated) NOTE — LETTER
AUTHORIZATION FOR SURGICAL OPERATION OR OTHER PROCEDURE    1. I hereby authorize Dr. Aneesh Alejo and the [facility] staff assigned to my case to perform the following operation and/or procedure at the [facility]:    _Botox___________________________________________________________________________________________      _______________________________________________________________________________________________    2. My physician has explained the nature and purpose of the operation or other procedure, possible alternative methods of treatment, the risks involved, and the possibility of complication to me. I acknowledge that no guarantee has been made as to the result that may be obtained. 3.  I recognize that, during the course of this operation, or other procedure, unforseen conditions may necessitate additional or different procedure than those listed above. I, therefore, further authorize and request that the above named physician, his/her physician assistants or designees perform such procedures as are, in his/her professional opinion, necessary and desirable. 4.  Any tissue or organs removed in the operation or other procedure may be disposed of by and at the discretion of the [facility] staff and [facility] AT [facility]. 5.  I understand that in the event of a medical emergency, I will be transported by local paramedics to [facility] or other hospital emergency department. 6.  I certify that I have read and fully understand the above consent to operation and/or other procedure. 7.  I acknowledge that my physician has explained sedation/analgesia administration to me including the risks and benefits. I consent to the administration of sedation/analgesia as may be necessary or desirable in the judgement of my physician. Witness signature: ___________________________________________________ Date:  ______/______/_____                    Time:  ________ A. M.  P.M.        Patient Name:  Herrera Safer  ____________________________________________________  (please print)       Patient signature:  ___________________________________________________             Relationship to Patient:           []  Parent    Responsible person                          []  Spouse  In case of minor or                    [] Other  _____________   Incompetent name:  __________________________________________________                               (please print)      _____________      Responsible person  In case of minor or  Incompetent signature:  _______________________________________________    Statement of Physician  My signature below affirms that prior to the time of the procedure, I have explained to the patient and/or his/her guardian, the risks and benefits involved in the proposed treatment and any reasonable alternative to the proposed treatment. I have also explained the risks and benefits involved in the refusal of the proposed treatment and have answered the patient's questions.                         Date:  ______/______/_______  Provider                      Signature:  __________________________________________________________       Time:  ___________ A.M    P.M.

## (undated) NOTE — LETTER
AUTHORIZATION FOR SURGICAL OPERATION OR OTHER PROCEDURE    1. I hereby authorize Dr. Melida Cornejo and the [facility] staff assigned to my case to perform the following operation and/or procedure at the [facility]:    ___Botox_________________________________________________________________________________________      _______________________________________________________________________________________________    2. My physician has explained the nature and purpose of the operation or other procedure, possible alternative methods of treatment, the risks involved, and the possibility of complication to me. I acknowledge that no guarantee has been made as to the result that may be obtained. 3.  I recognize that, during the course of this operation, or other procedure, unforseen conditions may necessitate additional or different procedure than those listed above. I, therefore, further authorize and request that the above named physician, his/her physician assistants or designees perform such procedures as are, in his/her professional opinion, necessary and desirable. 4.  Any tissue or organs removed in the operation or other procedure may be disposed of by and at the discretion of the [facility] staff and [facility]. 5.  I understand that in the event of a medical emergency, I will be transported by local paramedics to [facility] or other hospital emergency department. 6.  I certify that I have read and fully understand the above consent to operation and/or other procedure. 7.  I acknowledge that my physician has explained sedation/analgesia administration to me including the risks and benefits. I consent to the administration of sedation/analgesia as may be necessary or desirable in the judgement of my physician. Witness signature: ___________________________________________________ Date:  ______/______/_____                    Time:  ________ A. M.  P.M.        Patient Name:  Chiara Harris  (please print)       Patient signature:  ___________________________________________________             Relationship to Patient:           []  Parent    Responsible person                          []  Spouse  In case of minor or                    [] Other  _____________   Incompetent name:  __________________________________________________                               (please print)      _____________      Responsible person  In case of minor or  Incompetent signature:  _______________________________________________    Statement of Physician  My signature below affirms that prior to the time of the procedure, I have explained to the patient and/or his/her guardian, the risks and benefits involved in the proposed treatment and any reasonable alternative to the proposed treatment. I have also explained the risks and benefits involved in the refusal of the proposed treatment and have answered the patient's questions.                         Date:  ______/______/_______  Provider                      Signature:  __________________________________________________________       Time:  ___________ A.M    P.M.